# Patient Record
Sex: FEMALE | Race: WHITE | NOT HISPANIC OR LATINO | Employment: OTHER | URBAN - METROPOLITAN AREA
[De-identification: names, ages, dates, MRNs, and addresses within clinical notes are randomized per-mention and may not be internally consistent; named-entity substitution may affect disease eponyms.]

---

## 2017-08-31 ENCOUNTER — HISTORICAL (OUTPATIENT)
Dept: ADMINISTRATIVE | Facility: HOSPITAL | Age: 72
End: 2017-08-31

## 2017-09-03 LAB — FINAL CULTURE: NORMAL

## 2018-03-22 ENCOUNTER — HISTORICAL (OUTPATIENT)
Dept: RADIOLOGY | Facility: HOSPITAL | Age: 73
End: 2018-03-22

## 2018-03-22 LAB
ALBUMIN SERPL-MCNC: 3.3 GM/DL (ref 3.4–5)
ALBUMIN/GLOB SERPL: 0.9 RATIO (ref 1.1–2)
ALP SERPL-CCNC: 90 UNIT/L (ref 38–126)
ALT SERPL-CCNC: 22 UNIT/L (ref 12–78)
APPEARANCE, UA: CLEAR
AST SERPL-CCNC: 19 UNIT/L (ref 15–37)
BACTERIA #/AREA URNS AUTO: ABNORMAL /HPF
BILIRUB SERPL-MCNC: 0.3 MG/DL (ref 0.2–1)
BILIRUB UR QL STRIP: NEGATIVE
BILIRUBIN DIRECT+TOT PNL SERPL-MCNC: 0.1 MG/DL (ref 0–0.5)
BILIRUBIN DIRECT+TOT PNL SERPL-MCNC: 0.2 MG/DL (ref 0–0.8)
BUN SERPL-MCNC: 53 MG/DL (ref 7–18)
CALCIUM SERPL-MCNC: 9.1 MG/DL (ref 8.5–10.1)
CHLORIDE SERPL-SCNC: 105 MMOL/L (ref 98–107)
CO2 SERPL-SCNC: 28 MMOL/L (ref 21–32)
COLOR UR: YELLOW
CREAT SERPL-MCNC: 1.64 MG/DL (ref 0.55–1.02)
ERYTHROCYTE [DISTWIDTH] IN BLOOD BY AUTOMATED COUNT: 13.5 % (ref 11.5–17)
FT4I SERPL CALC-MCNC: 3.44 (ref 2.6–3.6)
GLOBULIN SER-MCNC: 3.7 GM/DL (ref 2.4–3.5)
GLUCOSE (UA): NEGATIVE
GLUCOSE SERPL-MCNC: 106 MG/DL (ref 74–106)
HCT VFR BLD AUTO: 39.6 % (ref 37–47)
HGB BLD-MCNC: 12.7 GM/DL (ref 12–16)
HGB UR QL STRIP: ABNORMAL
KETONES UR QL STRIP: NEGATIVE
LEUKOCYTE ESTERASE UR QL STRIP: NEGATIVE
MCH RBC QN AUTO: 30.2 PG (ref 27–31)
MCHC RBC AUTO-ENTMCNC: 32.1 GM/DL (ref 33–36)
MCV RBC AUTO: 94.3 FL (ref 80–94)
NITRITE UR QL STRIP.AUTO: NEGATIVE
PH UR STRIP: 5.5 [PH] (ref 5–9)
PLATELET # BLD AUTO: 273 X10(3)/MCL (ref 130–400)
PMV BLD AUTO: 9.4 FL (ref 9.4–12.4)
POTASSIUM SERPL-SCNC: 5 MMOL/L (ref 3.5–5.1)
PROT SERPL-MCNC: 7 GM/DL (ref 6.4–8.2)
PROT UR QL STRIP: ABNORMAL
PTH-INTACT SERPL-MCNC: 96.2 PG/ML (ref 18.4–80.1)
RBC # BLD AUTO: 4.2 X10(6)/MCL (ref 4.2–5.4)
RBC #/AREA URNS HPF: ABNORMAL /[HPF]
SODIUM SERPL-SCNC: 138 MMOL/L (ref 136–145)
SP GR UR STRIP: 1.01 (ref 1–1.03)
SQUAMOUS EPITHELIAL, UA: ABNORMAL
T3RU NFR SERPL: 37 % (ref 31–39)
T4 SERPL-MCNC: 9.3 MCG/DL (ref 4.7–13.3)
TSH SERPL-ACNC: 2.77 MIU/ML (ref 0.36–3.74)
UROBILINOGEN UR STRIP-ACNC: 0.2
WBC # SPEC AUTO: 9.4 X10(3)/MCL (ref 4.5–11.5)
WBC #/AREA URNS AUTO: ABNORMAL /[HPF]

## 2018-04-12 ENCOUNTER — HISTORICAL (OUTPATIENT)
Dept: RADIOLOGY | Facility: HOSPITAL | Age: 73
End: 2018-04-12

## 2018-08-21 ENCOUNTER — HISTORICAL (OUTPATIENT)
Dept: ADMINISTRATIVE | Facility: HOSPITAL | Age: 73
End: 2018-08-21

## 2018-08-23 LAB — FINAL CULTURE: NORMAL

## 2020-01-30 ENCOUNTER — HISTORICAL (OUTPATIENT)
Dept: SURGERY | Facility: HOSPITAL | Age: 75
End: 2020-01-30

## 2020-01-30 LAB
HCT VFR BLD AUTO: 43.1 % (ref 37–47)
HGB BLD-MCNC: 13.5 GM/DL (ref 12–16)
INR PPP: 1 (ref 0–1.3)
PLATELET # BLD AUTO: 298 X10(3)/MCL (ref 130–400)
PROTHROMBIN TIME: 13.4 SECOND(S) (ref 12–14)

## 2020-02-13 ENCOUNTER — HISTORICAL (OUTPATIENT)
Dept: SURGERY | Facility: HOSPITAL | Age: 75
End: 2020-02-13

## 2020-05-07 LAB
ABS NEUT (OLG): 7.33 X10(3)/MCL (ref 2.1–9.2)
BASOPHILS # BLD AUTO: 0.1 X10(3)/MCL (ref 0–0.2)
BASOPHILS NFR BLD AUTO: 1 %
BUN SERPL-MCNC: 51 MG/DL (ref 9.8–20.1)
CALCIUM SERPL-MCNC: 8.7 MG/DL (ref 8.4–10.2)
CHLORIDE SERPL-SCNC: 111 MMOL/L (ref 98–107)
CO2 SERPL-SCNC: 21 MMOL/L (ref 23–31)
CREAT SERPL-MCNC: 1.74 MG/DL (ref 0.55–1.02)
CREAT/UREA NIT SERPL: 29
EOSINOPHIL # BLD AUTO: 0.3 X10(3)/MCL (ref 0–0.9)
EOSINOPHIL NFR BLD AUTO: 2 %
ERYTHROCYTE [DISTWIDTH] IN BLOOD BY AUTOMATED COUNT: 15.3 % (ref 11.5–17)
GLUCOSE SERPL-MCNC: 89 MG/DL (ref 82–115)
HCT VFR BLD AUTO: 42.3 % (ref 37–47)
HGB BLD-MCNC: 13.2 GM/DL (ref 12–16)
INR PPP: 1 (ref 0–1.3)
LYMPHOCYTES # BLD AUTO: 2.8 X10(3)/MCL (ref 0.6–4.6)
LYMPHOCYTES NFR BLD AUTO: 25 %
MCH RBC QN AUTO: 30.6 PG (ref 27–31)
MCHC RBC AUTO-ENTMCNC: 31.2 GM/DL (ref 33–36)
MCV RBC AUTO: 98.1 FL (ref 80–94)
MONOCYTES # BLD AUTO: 0.7 X10(3)/MCL (ref 0.1–1.3)
MONOCYTES NFR BLD AUTO: 6 %
NEUTROPHILS # BLD AUTO: 7.33 X10(3)/MCL (ref 2.1–9.2)
NEUTROPHILS NFR BLD AUTO: 65 %
PLATELET # BLD AUTO: 314 X10(3)/MCL (ref 130–400)
PMV BLD AUTO: 10 FL (ref 9.4–12.4)
POTASSIUM SERPL-SCNC: 5.6 MMOL/L (ref 3.5–5.1)
PROTHROMBIN TIME: 12.3 SECOND(S) (ref 11.1–13.7)
RBC # BLD AUTO: 4.31 X10(6)/MCL (ref 4.2–5.4)
SODIUM SERPL-SCNC: 142 MMOL/L (ref 136–145)
WBC # SPEC AUTO: 11.3 X10(3)/MCL (ref 4.5–11.5)

## 2020-05-13 ENCOUNTER — HISTORICAL (OUTPATIENT)
Dept: SURGERY | Facility: HOSPITAL | Age: 75
End: 2020-05-13

## 2020-05-22 ENCOUNTER — HISTORICAL (OUTPATIENT)
Dept: PREADMISSION TESTING | Facility: HOSPITAL | Age: 75
End: 2020-05-22

## 2021-11-17 ENCOUNTER — HISTORICAL (OUTPATIENT)
Dept: ADMINISTRATIVE | Facility: HOSPITAL | Age: 76
End: 2021-11-17

## 2021-11-17 LAB
BUN SERPL-MCNC: 40.5 MG/DL (ref 9.8–20.1)
CALCIUM SERPL-MCNC: 9 MG/DL (ref 8.7–10.5)
CHLORIDE SERPL-SCNC: 106 MMOL/L (ref 98–107)
CO2 SERPL-SCNC: 26 MMOL/L (ref 23–31)
CREAT SERPL-MCNC: 3.33 MG/DL (ref 0.55–1.02)
CREAT/UREA NIT SERPL: 12
GLUCOSE SERPL-MCNC: 91 MG/DL (ref 82–115)
POTASSIUM SERPL-SCNC: 3.8 MMOL/L (ref 3.5–5.1)
SODIUM SERPL-SCNC: 142 MMOL/L (ref 136–145)

## 2022-03-02 ENCOUNTER — HISTORICAL (OUTPATIENT)
Dept: PREADMISSION TESTING | Facility: HOSPITAL | Age: 77
End: 2022-03-02

## 2022-03-17 ENCOUNTER — HISTORICAL (OUTPATIENT)
Dept: ADMINISTRATIVE | Facility: HOSPITAL | Age: 77
End: 2022-03-17

## 2022-03-17 LAB
ALBUMIN SERPL-MCNC: 3.3 G/DL (ref 3.4–4.8)
ALBUMIN/GLOB SERPL: 0.8 {RATIO} (ref 1.1–2)
ALP SERPL-CCNC: 94 U/L (ref 40–150)
ALT SERPL-CCNC: 15 U/L (ref 0–55)
APTT PPP: 32.9 S (ref 23.2–33.7)
AST SERPL-CCNC: 19 U/L (ref 5–34)
BILIRUB SERPL-MCNC: 0.4 MG/DL
BILIRUBIN DIRECT+TOT PNL SERPL-MCNC: 0.2 (ref 0–0.5)
BILIRUBIN DIRECT+TOT PNL SERPL-MCNC: 0.2 (ref 0–0.8)
BUN SERPL-MCNC: 61.2 MG/DL (ref 9.8–20.1)
CALCIUM SERPL-MCNC: 9.9 MG/DL (ref 8.7–10.5)
CHLORIDE SERPL-SCNC: 105 MMOL/L (ref 98–107)
CO2 SERPL-SCNC: 23 MMOL/L (ref 23–31)
CREAT SERPL-MCNC: 4.84 MG/DL (ref 0.55–1.02)
ERYTHROCYTE [DISTWIDTH] IN BLOOD BY AUTOMATED COUNT: 15 % (ref 11.5–17)
GLOBULIN SER-MCNC: 4.1 G/DL (ref 2.4–3.5)
GLUCOSE SERPL-MCNC: 91 MG/DL (ref 82–115)
HCT VFR BLD AUTO: 37.6 % (ref 37–47)
HEMOLYSIS INTERF INDEX SERPL-ACNC: 5
HGB BLD-MCNC: 11.9 G/DL (ref 12–16)
ICTERIC INTERF INDEX SERPL-ACNC: 0
INR PPP: 1 (ref 0–1.3)
LIPEMIC INTERF INDEX SERPL-ACNC: 6
MCH RBC QN AUTO: 32.8 PG (ref 27–31)
MCHC RBC AUTO-ENTMCNC: 31.6 G/DL (ref 33–36)
MCV RBC AUTO: 103.6 FL (ref 80–94)
PLATELET # BLD AUTO: 295 10*3/UL (ref 130–400)
PMV BLD AUTO: 10 FL (ref 9.4–12.4)
POTASSIUM SERPL-SCNC: 6 MMOL/L (ref 3.5–5.1)
PROT SERPL-MCNC: 7.4 G/DL (ref 5.8–7.6)
PROTHROMBIN TIME: 13.3 S (ref 12.5–14.5)
RBC # BLD AUTO: 3.63 10*6/UL (ref 4.2–5.4)
SODIUM SERPL-SCNC: 140 MMOL/L (ref 136–145)
WBC # SPEC AUTO: 10.4 10*3/UL (ref 4.5–11.5)

## 2022-03-23 ENCOUNTER — HISTORICAL (OUTPATIENT)
Dept: RADIOLOGY | Facility: HOSPITAL | Age: 77
End: 2022-03-23

## 2022-03-23 ENCOUNTER — HISTORICAL (OUTPATIENT)
Dept: ADMINISTRATIVE | Facility: HOSPITAL | Age: 77
End: 2022-03-23

## 2022-04-10 ENCOUNTER — HISTORICAL (OUTPATIENT)
Dept: ADMINISTRATIVE | Facility: HOSPITAL | Age: 77
End: 2022-04-10
Payer: MEDICARE

## 2022-04-25 VITALS
DIASTOLIC BLOOD PRESSURE: 75 MMHG | OXYGEN SATURATION: 98 % | SYSTOLIC BLOOD PRESSURE: 142 MMHG | WEIGHT: 132.94 LBS | BODY MASS INDEX: 25.1 KG/M2 | HEIGHT: 61 IN

## 2022-04-29 NOTE — OP NOTE
Patient:   Deepti Zambrano            MRN: [id number]            FIN: [id number]               Age:   74 years     Sex:  Female     :  1945   Associated Diagnoses:   None   Author:   Magnus Bocanegra MD      DATE OF SURGERY:    2020    SURGEON:  Magnus Bocanegra MD    PREOPERATIVE DIAGNOSIS:  Lumbar spondylosis.    POSTOPERATIVE DIAGNOSIS:  Lumbar spondylosis.    PROCEDURE PERFORMED:  Fluoroscopically-guided medial branch blocks at bilateral L3, L4, L5, S1.    EQUIPMENT USED:  Epidural tray.    DESCRIPTION OF PROCEDURE:  Following informed consent, the patient was prepped and draped in the usual sterile fashion.  I infiltrated the tissue overlying L3, L4, L5, S1 with local anesthetic.  Under fluoroscopic guidance, I advanced eight 22-gauge 3.5 inch BD spinal needles, performing medial branch blocks [id number] 2% lidocaine.  I removed the needles and applied sterile dressings.  The patient tolerated the procedure well without apparent complication.    IMPRESSION:  Successful fluoroscopically-guided medial branch blocks at bilateral L3, L4, L5, S1.

## 2022-04-29 NOTE — OP NOTE
Patient:   Deepti Zambrano            MRN: [id number]            FIN: [id number]               Age:   75 years     Sex:  Female     :  1945   Associated Diagnoses:   None   Author:   Magnus Bocanegra MD      DATE OF SURGERY:    2020    SURGEON:  Magnus Bocanegra MD    PREOPERATIVE DIAGNOSIS:  Lumbar spondylosis.    POSTOPERATIVE DIAGNOSIS:  Lumbar spondylosis.    PROCEDURE:  Fluoroscopically guided radiofrequency ablation at right L3, L4, L5, S1    PROCEDURE IN DETAIL:  Following informed consent, and with the patient under general anesthesia, she was prepped and draped in usual sterile fashion.  The skin and subcutaneous tissue were anesthetized.  Following this I used an 18 gauge angulated 100 mm exposed tip needle at each level, correctly placing them under fluoroscopic guidance at the junction of the SAP and transverse process.  After stimulating the medial branch nerve at each level without evidence of motor signs, I performed radiofrequency ablation of each level, heating the affected nerves to 80 degrees centigrade for 90 seconds.  A total of four sites were heated and treated.  I removed the needles and applied sterile dressings.  The patient tolerated the procedure well without apparent complications.    IMPRESSION:  Successful fluoroscopically guided radiofrequency ablation at right L3, L4, L5, S1        ______________________________  Magnus Bocanegra MD

## 2022-04-29 NOTE — OP NOTE
Patient:   Deepti Zambrano             MRN: [id number]            FIN: [id number]               Age:   76 years     Sex:  Female     :  1945   Associated Diagnoses:   ESRD on dialysis   Author:   Rosita Gerber MD      Brief Operative Note   Operative Information   Date/ Time:  2021 12:28:00.     Preoperative Diagnosis: ESRD on dialysis (BNU84-BJ N18.6).     Postoperative Diagnosis: ESRD on dialysis (OXP53-NN N18.6).     Procedures Performed: Left upper extremity dialysis graft placement.     Indications: Mr. Zambrano is a 77 y/o woman with ESRD who needs long term dialysis access.  She is currently using a right chest wall tunneled catheter.  She presents for left arm access creation. .     Surgeon: Rosita Gerber MD.     Esimated blood loss: loss less than  100  cc.     Description of Procedure/Findings/    Complications: The patient was taken to the OR and placed in a supine position on the operative table.  The left upper extremity was prepped and draped in the usual sterile fashion.   2 grams ancef infusion started prior to the incision.  Appropriate timeout was performed. B mode u/s was utilized to identify the brachial artery and axillary vein in the proximal upper arm.  Incision was made and dissection was performed through the subcutaneous tissues and facia to expose the brachial artery and axillary vein.  Each were controlled with vessel loops.  A 4 to 7 mm goretex graft was placed via subcutaneous tunnelling in loop configuration with assistance of a counterincision in the distal upper arm.  5000 units of heparin was administered and a longitudinal incision was made on the brachial artery.   Anastomosis was performed with a Castlewood-Marcell CV6 suture.  Flow was restored through the brachial artery.  The distal end of the graft was spatulated and venotomy was made.   Another anastamosis with [place] CV-6  suture was performed.  Flow was restored to the system.  There was thrill to  the graft and a palpable radial pulse.    Protamine was administered and hemostasis was obtained.  Wound was irrigated with antibiotic solution.   Both wounds were closed with 3-0 vicryl suture and 4-0 monocryl suture.   Dermabond dressing was placed.      .

## 2022-04-29 NOTE — OP NOTE
Patient:   Deepti Zambrano            MRN: [id number]            FIN: [id number]               Age:   74 years     Sex:  Female     :  1945   Associated Diagnoses:   None   Author:   Magnus Bocanegra MD      DATE OF SURGERY:    2020    SURGEON:  Magnus Bocanegra MD    PREOPERATIVE DIAGNOSIS:  Lumbar spondylosis.    POSTOPERATIVE DIAGNOSIS:  Lumbar spondylosis.    PROCEDURE PERFORMED:  Fluoroscopically-guided medial branch blocks at bilateral L3, L4, L5, S1.    EQUIPMENT USED:  Epidural tray.    DESCRIPTION OF PROCEDURE:  Following informed consent, the patient was prepped and draped in the usual sterile fashion.  I infiltrated the tissue overlying L3, L4, L5, S1 with local anesthetic.  Under fluoroscopic guidance, I advanced eight 22-gauge 3.5 inch BD spinal needles, performing medial branch blocks [id number] 2% lidocaine.  I removed the needles and applied sterile dressings.  The patient tolerated the procedure well without apparent complication.    IMPRESSION:  Successful fluoroscopically-guided medial branch blocks at bilateral L3, L4, L5, S1.

## 2022-05-18 DIAGNOSIS — R91.8 LUNG MASS: Primary | ICD-10-CM

## 2022-06-15 DIAGNOSIS — Z01.818 OTHER SPECIFIED PRE-OPERATIVE EXAMINATION: Primary | ICD-10-CM

## 2022-06-17 ENCOUNTER — HOSPITAL ENCOUNTER (OUTPATIENT)
Dept: RADIOLOGY | Facility: HOSPITAL | Age: 77
Discharge: HOME | End: 2022-06-17
Attending: SURGERY
Payer: MEDICARE

## 2022-06-17 DIAGNOSIS — Z01.818 OTHER SPECIFIED PRE-OPERATIVE EXAMINATION: ICD-10-CM

## 2022-06-17 PROCEDURE — 71046 X-RAY EXAM CHEST 2 VIEWS: CPT | Mod: TC

## 2022-06-18 RX ORDER — METOPROLOL SUCCINATE 50 MG/1
50 TABLET, EXTENDED RELEASE ORAL DAILY
COMMUNITY
Start: 2022-05-07

## 2022-06-18 RX ORDER — HYDRALAZINE HYDROCHLORIDE 50 MG/1
50 TABLET, FILM COATED ORAL EVERY 8 HOURS
COMMUNITY

## 2022-06-18 RX ORDER — AMLODIPINE BESYLATE 10 MG/1
10 TABLET ORAL NIGHTLY
COMMUNITY
Start: 2022-05-25 | End: 2024-01-01 | Stop reason: CLARIF

## 2022-06-18 RX ORDER — CLOPIDOGREL BISULFATE 75 MG/1
75 TABLET ORAL DAILY
Status: ON HOLD | COMMUNITY
Start: 2022-05-25 | End: 2023-01-01 | Stop reason: HOSPADM

## 2022-06-18 RX ORDER — SEVELAMER CARBONATE 800 MG/1
800 TABLET, FILM COATED ORAL
Status: ON HOLD | COMMUNITY
Start: 2022-05-09 | End: 2023-01-01 | Stop reason: HOSPADM

## 2022-06-18 RX ORDER — PANTOPRAZOLE SODIUM 40 MG/1
40 TABLET, DELAYED RELEASE ORAL NIGHTLY
COMMUNITY
Start: 2022-05-25

## 2022-06-18 RX ORDER — AMITRIPTYLINE HYDROCHLORIDE 25 MG/1
25 TABLET, FILM COATED ORAL NIGHTLY
COMMUNITY
Start: 2022-05-09

## 2022-06-18 RX ORDER — FUROSEMIDE 80 MG/1
40 TABLET ORAL DAILY
COMMUNITY
Start: 2022-05-09

## 2022-06-18 RX ORDER — ATORVASTATIN CALCIUM 40 MG/1
40 TABLET, FILM COATED ORAL NIGHTLY
COMMUNITY
Start: 2022-05-25 | End: 2024-01-01 | Stop reason: CLARIF

## 2022-06-18 RX ORDER — ALLOPURINOL 100 MG/1
100 TABLET ORAL NIGHTLY
COMMUNITY
Start: 2022-05-25

## 2022-06-22 ENCOUNTER — HOSPITAL ENCOUNTER (OUTPATIENT)
Facility: HOSPITAL | Age: 77
Discharge: HOME | End: 2022-06-22
Attending: SURGERY | Admitting: SURGERY
Payer: MEDICARE

## 2022-06-22 ENCOUNTER — ANESTHESIA (OUTPATIENT)
Dept: SURGERY | Facility: HOSPITAL | Age: 77
End: 2022-06-22
Payer: MEDICARE

## 2022-06-22 ENCOUNTER — ANESTHESIA EVENT (OUTPATIENT)
Dept: SURGERY | Facility: HOSPITAL | Age: 77
End: 2022-06-22
Payer: MEDICARE

## 2022-06-22 ENCOUNTER — ANESTHESIA EVENT (OUTPATIENT)
Dept: SURGERY | Facility: HOSPITAL | Age: 77
End: 2022-06-22

## 2022-06-22 ENCOUNTER — ANESTHESIA (OUTPATIENT)
Dept: SURGERY | Facility: HOSPITAL | Age: 77
End: 2022-06-22

## 2022-06-22 LAB
ANION GAP SERPL CALC-SCNC: 15 MMOL/L (ref 8–16)
BUN SERPL-MCNC: 65 MG/DL (ref 6–30)
CHLORIDE SERPL-SCNC: 104 MMOL/L (ref 95–110)
CREAT SERPL-MCNC: 4.7 MG/DL (ref 0.5–1.4)
GLUCOSE SERPL-MCNC: 97 MG/DL (ref 70–110)
HCT VFR BLD CALC: 38 %PCV (ref 36–54)
HGB BLD-MCNC: 13 G/DL
POC IONIZED CALCIUM: 1.19 MMOL/L (ref 1.06–1.42)
POC TCO2 (MEASURED): 27 MMOL/L (ref 23–29)
POTASSIUM BLD-SCNC: 4.9 MMOL/L (ref 3.5–5.1)
SAMPLE: ABNORMAL
SODIUM BLD-SCNC: 140 MMOL/L (ref 136–145)

## 2022-06-22 PROCEDURE — 71000015 HC POSTOP RECOV 1ST HR: Performed by: SURGERY

## 2022-06-22 PROCEDURE — 63600175 PHARM REV CODE 636 W HCPCS

## 2022-06-22 PROCEDURE — 25000003 PHARM REV CODE 250

## 2022-06-22 PROCEDURE — 36000708 HC OR TIME LEV III 1ST 15 MIN: Performed by: SURGERY

## 2022-06-22 PROCEDURE — 71000033 HC RECOVERY, INTIAL HOUR: Performed by: SURGERY

## 2022-06-22 PROCEDURE — 25000003 PHARM REV CODE 250: Performed by: SURGERY

## 2022-06-22 PROCEDURE — 63600175 PHARM REV CODE 636 W HCPCS: Performed by: SURGERY

## 2022-06-22 PROCEDURE — 36000709 HC OR TIME LEV III EA ADD 15 MIN: Performed by: SURGERY

## 2022-06-22 PROCEDURE — 37000008 HC ANESTHESIA 1ST 15 MINUTES: Performed by: SURGERY

## 2022-06-22 PROCEDURE — 63600175 PHARM REV CODE 636 W HCPCS: Performed by: ANESTHESIOLOGY

## 2022-06-22 PROCEDURE — 71000039 HC RECOVERY, EACH ADD'L HOUR: Performed by: SURGERY

## 2022-06-22 PROCEDURE — 71000016 HC POSTOP RECOV ADDL HR: Performed by: SURGERY

## 2022-06-22 PROCEDURE — 37000009 HC ANESTHESIA EA ADD 15 MINS: Performed by: SURGERY

## 2022-06-22 PROCEDURE — C1750 CATH, HEMODIALYSIS,LONG-TERM: HCPCS | Performed by: SURGERY

## 2022-06-22 RX ORDER — HEPARIN SODIUM 5000 [USP'U]/ML
INJECTION, SOLUTION INTRAVENOUS; SUBCUTANEOUS
Status: DISCONTINUED | OUTPATIENT
Start: 2022-06-22 | End: 2022-06-22 | Stop reason: HOSPADM

## 2022-06-22 RX ORDER — HYDROMORPHONE HYDROCHLORIDE 2 MG/ML
INJECTION, SOLUTION INTRAMUSCULAR; INTRAVENOUS; SUBCUTANEOUS
Status: DISCONTINUED
Start: 2022-06-22 | End: 2022-06-22 | Stop reason: HOSPADM

## 2022-06-22 RX ORDER — MIDAZOLAM HYDROCHLORIDE 1 MG/ML
INJECTION INTRAMUSCULAR; INTRAVENOUS
Status: COMPLETED
Start: 2022-06-22 | End: 2022-06-22

## 2022-06-22 RX ORDER — IPRATROPIUM BROMIDE AND ALBUTEROL SULFATE 2.5; .5 MG/3ML; MG/3ML
3 SOLUTION RESPIRATORY (INHALATION) ONCE AS NEEDED
Status: DISCONTINUED | OUTPATIENT
Start: 2022-06-22 | End: 2022-06-22 | Stop reason: HOSPADM

## 2022-06-22 RX ORDER — FENTANYL CITRATE 50 UG/ML
INJECTION, SOLUTION INTRAMUSCULAR; INTRAVENOUS
Status: DISCONTINUED | OUTPATIENT
Start: 2022-06-22 | End: 2022-06-22

## 2022-06-22 RX ORDER — ONDANSETRON 4 MG/1
8 TABLET, ORALLY DISINTEGRATING ORAL EVERY 6 HOURS PRN
Status: CANCELLED | OUTPATIENT
Start: 2022-06-22

## 2022-06-22 RX ORDER — HYDROMORPHONE HYDROCHLORIDE 2 MG/ML
0.4 INJECTION, SOLUTION INTRAMUSCULAR; INTRAVENOUS; SUBCUTANEOUS EVERY 5 MIN PRN
Status: DISCONTINUED | OUTPATIENT
Start: 2022-06-22 | End: 2022-06-22 | Stop reason: HOSPADM

## 2022-06-22 RX ORDER — SODIUM CHLORIDE, SODIUM LACTATE, POTASSIUM CHLORIDE, CALCIUM CHLORIDE 600; 310; 30; 20 MG/100ML; MG/100ML; MG/100ML; MG/100ML
INJECTION, SOLUTION INTRAVENOUS CONTINUOUS
Status: DISCONTINUED | OUTPATIENT
Start: 2022-06-22 | End: 2022-06-22 | Stop reason: HOSPADM

## 2022-06-22 RX ORDER — HYDROCODONE BITARTRATE AND ACETAMINOPHEN 5; 325 MG/1; MG/1
1 TABLET ORAL EVERY 6 HOURS PRN
Qty: 20 TABLET | Refills: 0 | Status: ON HOLD | OUTPATIENT
Start: 2022-06-22 | End: 2022-07-28 | Stop reason: HOSPADM

## 2022-06-22 RX ORDER — HYDROCODONE BITARTRATE AND ACETAMINOPHEN 7.5; 325 MG/1; MG/1
1 TABLET ORAL EVERY 6 HOURS PRN
Status: DISCONTINUED | OUTPATIENT
Start: 2022-06-22 | End: 2022-06-22 | Stop reason: HOSPADM

## 2022-06-22 RX ORDER — BUPIVACAINE HYDROCHLORIDE AND EPINEPHRINE 5; 5 MG/ML; UG/ML
INJECTION, SOLUTION EPIDURAL; INTRACAUDAL; PERINEURAL
Status: DISCONTINUED | OUTPATIENT
Start: 2022-06-22 | End: 2022-06-22 | Stop reason: HOSPADM

## 2022-06-22 RX ORDER — MEPERIDINE HYDROCHLORIDE 25 MG/ML
12.5 INJECTION INTRAMUSCULAR; INTRAVENOUS; SUBCUTANEOUS ONCE AS NEEDED
Status: DISCONTINUED | OUTPATIENT
Start: 2022-06-22 | End: 2022-06-22 | Stop reason: HOSPADM

## 2022-06-22 RX ORDER — LIDOCAINE HYDROCHLORIDE 10 MG/ML
1 INJECTION, SOLUTION EPIDURAL; INFILTRATION; INTRACAUDAL; PERINEURAL ONCE
Status: DISCONTINUED | OUTPATIENT
Start: 2022-06-22 | End: 2022-06-22 | Stop reason: HOSPADM

## 2022-06-22 RX ORDER — MIDAZOLAM HYDROCHLORIDE 1 MG/ML
2 INJECTION INTRAMUSCULAR; INTRAVENOUS ONCE AS NEEDED
Status: COMPLETED | OUTPATIENT
Start: 2022-06-22 | End: 2022-06-22

## 2022-06-22 RX ORDER — LEVOFLOXACIN 5 MG/ML
500 INJECTION, SOLUTION INTRAVENOUS
Status: DISCONTINUED | OUTPATIENT
Start: 2022-06-22 | End: 2022-06-22 | Stop reason: HOSPADM

## 2022-06-22 RX ORDER — LIDOCAINE HYDROCHLORIDE 10 MG/ML
INJECTION, SOLUTION EPIDURAL; INFILTRATION; INTRACAUDAL; PERINEURAL
Status: DISCONTINUED | OUTPATIENT
Start: 2022-06-22 | End: 2022-06-22

## 2022-06-22 RX ORDER — ONDANSETRON 2 MG/ML
4 INJECTION INTRAMUSCULAR; INTRAVENOUS EVERY 4 HOURS PRN
Status: DISCONTINUED | OUTPATIENT
Start: 2022-06-22 | End: 2022-06-22 | Stop reason: HOSPADM

## 2022-06-22 RX ORDER — BUPIVACAINE HYDROCHLORIDE AND EPINEPHRINE 5; 5 MG/ML; UG/ML
INJECTION, SOLUTION EPIDURAL; INTRACAUDAL; PERINEURAL
Status: DISCONTINUED
Start: 2022-06-22 | End: 2022-06-22 | Stop reason: HOSPADM

## 2022-06-22 RX ORDER — SODIUM CHLORIDE, SODIUM GLUCONATE, SODIUM ACETATE, POTASSIUM CHLORIDE AND MAGNESIUM CHLORIDE 30; 37; 368; 526; 502 MG/100ML; MG/100ML; MG/100ML; MG/100ML; MG/100ML
1000 INJECTION, SOLUTION INTRAVENOUS CONTINUOUS
Status: DISCONTINUED | OUTPATIENT
Start: 2022-06-22 | End: 2022-06-22 | Stop reason: HOSPADM

## 2022-06-22 RX ORDER — NEOSTIGMINE METHYLSULFATE 0.5 MG/ML
INJECTION, SOLUTION INTRAVENOUS
Status: DISCONTINUED | OUTPATIENT
Start: 2022-06-22 | End: 2022-06-22

## 2022-06-22 RX ORDER — TRAMADOL HYDROCHLORIDE 50 MG/1
50 TABLET ORAL EVERY 4 HOURS PRN
Status: DISCONTINUED | OUTPATIENT
Start: 2022-06-22 | End: 2022-06-22 | Stop reason: HOSPADM

## 2022-06-22 RX ORDER — MEPERIDINE HYDROCHLORIDE 25 MG/ML
50 INJECTION INTRAMUSCULAR; INTRAVENOUS; SUBCUTANEOUS
Status: DISCONTINUED | OUTPATIENT
Start: 2022-06-22 | End: 2022-06-22 | Stop reason: HOSPADM

## 2022-06-22 RX ORDER — GLYCOPYRROLATE 0.2 MG/ML
INJECTION INTRAMUSCULAR; INTRAVENOUS
Status: DISCONTINUED | OUTPATIENT
Start: 2022-06-22 | End: 2022-06-22

## 2022-06-22 RX ORDER — PROPOFOL 10 MG/ML
VIAL (ML) INTRAVENOUS
Status: DISCONTINUED | OUTPATIENT
Start: 2022-06-22 | End: 2022-06-22

## 2022-06-22 RX ORDER — VANCOMYCIN HYDROCHLORIDE 500 MG/10ML
INJECTION, POWDER, LYOPHILIZED, FOR SOLUTION INTRAVENOUS
Status: DISCONTINUED
Start: 2022-06-22 | End: 2022-06-22 | Stop reason: HOSPADM

## 2022-06-22 RX ORDER — HEPARIN SODIUM 5000 [USP'U]/ML
INJECTION, SOLUTION INTRAVENOUS; SUBCUTANEOUS
Status: DISCONTINUED
Start: 2022-06-22 | End: 2022-06-22 | Stop reason: HOSPADM

## 2022-06-22 RX ORDER — SODIUM CITRATE AND CITRIC ACID MONOHYDRATE 334; 500 MG/5ML; MG/5ML
30 SOLUTION ORAL ONCE
Status: CANCELLED | OUTPATIENT
Start: 2022-06-22 | End: 2022-06-22

## 2022-06-22 RX ORDER — ROCURONIUM BROMIDE 10 MG/ML
INJECTION, SOLUTION INTRAVENOUS
Status: DISCONTINUED | OUTPATIENT
Start: 2022-06-22 | End: 2022-06-22

## 2022-06-22 RX ORDER — ACETAMINOPHEN 10 MG/ML
1000 INJECTION, SOLUTION INTRAVENOUS ONCE
Status: DISCONTINUED | OUTPATIENT
Start: 2022-06-22 | End: 2022-06-22 | Stop reason: HOSPADM

## 2022-06-22 RX ORDER — HYDROMORPHONE HYDROCHLORIDE 2 MG/ML
0.2 INJECTION, SOLUTION INTRAMUSCULAR; INTRAVENOUS; SUBCUTANEOUS EVERY 5 MIN PRN
Status: DISCONTINUED | OUTPATIENT
Start: 2022-06-22 | End: 2022-06-22 | Stop reason: HOSPADM

## 2022-06-22 RX ADMIN — HYDROMORPHONE HYDROCHLORIDE 0.4 MG: 2 INJECTION, SOLUTION INTRAMUSCULAR; INTRAVENOUS; SUBCUTANEOUS at 02:06

## 2022-06-22 RX ADMIN — NEOSTIGMINE METHYLSULFATE 4 MG: 0.5 INJECTION INTRAVENOUS at 02:06

## 2022-06-22 RX ADMIN — ROCURONIUM BROMIDE 40 MG: 10 SOLUTION INTRAVENOUS at 01:06

## 2022-06-22 RX ADMIN — FENTANYL CITRATE 50 MCG: 50 INJECTION, SOLUTION INTRAMUSCULAR; INTRAVENOUS at 02:06

## 2022-06-22 RX ADMIN — GLYCOPYRROLATE 0.4 MG: 0.2 INJECTION INTRAMUSCULAR; INTRAVENOUS at 02:06

## 2022-06-22 RX ADMIN — LIDOCAINE HYDROCHLORIDE 40 MG: 10 INJECTION, SOLUTION EPIDURAL; INFILTRATION; INTRACAUDAL; PERINEURAL at 01:06

## 2022-06-22 RX ADMIN — MIDAZOLAM HYDROCHLORIDE 2 MG: 1 INJECTION, SOLUTION INTRAMUSCULAR; INTRAVENOUS at 01:06

## 2022-06-22 RX ADMIN — MIDAZOLAM HYDROCHLORIDE 2 MG: 1 INJECTION INTRAMUSCULAR; INTRAVENOUS at 01:06

## 2022-06-22 RX ADMIN — FENTANYL CITRATE 50 MCG: 50 INJECTION, SOLUTION INTRAMUSCULAR; INTRAVENOUS at 01:06

## 2022-06-22 RX ADMIN — VANCOMYCIN HYDROCHLORIDE 100 ML: 500 INJECTION, POWDER, LYOPHILIZED, FOR SOLUTION INTRAVENOUS at 01:06

## 2022-06-22 RX ADMIN — PROPOFOL 120 MG: 10 INJECTION, EMULSION INTRAVENOUS at 01:06

## 2022-06-22 RX ADMIN — VANCOMYCIN HYDROCHLORIDE: 500 INJECTION, POWDER, LYOPHILIZED, FOR SOLUTION INTRAVENOUS at 12:06

## 2022-06-22 RX ADMIN — SODIUM CHLORIDE, POTASSIUM CHLORIDE, SODIUM LACTATE AND CALCIUM CHLORIDE: 600; 310; 30; 20 INJECTION, SOLUTION INTRAVENOUS at 12:06

## 2022-06-22 NOTE — DISCHARGE INSTRUCTIONS
Activity - no heavy lifting >20lbs x 4 weeks   Wounds - may shower in 48hr. NO tub baths or soaking. After gently cleansing with soap and water, pat dressings dry. Allow steri-strips to fall off on their own. We will remove them at your post-op visit.    Diet - resume  Follow up in 2 weeks  Do NOT use PD catheter until evaluation at post-op visit

## 2022-06-22 NOTE — OP NOTE
Patient:  Deepti Zambrano        :  1945         Date of Surgery:  2022          SURGEON:  Edmund Echols MD           ASSISTANT:  Maria Isabel Price NP          PREOPERATIVE DIAGNOSIS:  Chronic Renal Failure           POSTOPERATIVE DIAGNOSIS:  Same.           OPERATIONS:  Laparoscopic Peritoneal Dialysis Catheter placement           Anesthesia:  General endotracheal anesthesia      Estimated blood loss:  minimal        Blood administered:  None      Lap and instrument counts correct x 2 at the end of the case.             INDICATIONS/SIGNIFICANT HISTORY:  The patient is a 77 y.o.  year old female who was referred for evaluation of peritoneal dialysis catheter for chronic renal failure. Risks and Benefits of surgery was discussed with the patient, who voiced understanding of risks and benefits and elected to proceed with surgery.           PROCEDURE IN DETAIL:  Once informed consents were obtained, the patient was taken to the operating room and placed supine on the operating table.  After general endotracheal anesthesia was induced, the abdomen was prepped and draped in a standard surgical fashion.  A 5mm incision was made in the Left upper and a Visiport trocar was used to enter the abdominal cavity.  Pneumoperitoneum was created with insufflation.  After adequate insufflation, the 5mm scope was used to visualized the intra-abdominal compartment.  The catheter was tunneled subcutaneous in the standard fashion and tunneled into the abdominal cavity under direct visualization.  There was no scar tissue intraabdominally.  No bleeding was appreciated.  The scope and port was then removed along with insufflation.  The skin was closed with a 4-0 Vicryl suture in an interrupted fashion.  The patient tolerated the procedure well and was transported to recovery room in good condition.

## 2022-06-22 NOTE — ANESTHESIA PROCEDURE NOTES
Intubation    Date/Time: 6/22/2022 1:45 PM  Performed by: Nancy Leyva CRNA  Authorized by: Victor Hugo Hsieh MD     Intubation:     Induction:  Inhalational - mask    Intubated:  Postinduction    Mask Ventilation:  Easy with oral airway    Attempts:  1    Attempted By:  CRNA    Method of Intubation:  Direct    Blade:  Coty 3    Laryngeal View Grade: Grade I - full view of cords      Difficult Airway Encountered?: No      Complications:  None    Airway Device:  Oral endotracheal tube    Airway Device Size:  7.0    Style/Cuff Inflation:  Cuffed (inflated to minimal occlusive pressure)    Tube secured:  20    Secured at:  The lips    Placement Verified By:  Capnometry    Complicating Factors:  None    Findings Post-Intubation:  BS equal bilateral

## 2022-06-22 NOTE — ANESTHESIA POSTPROCEDURE EVALUATION
Anesthesia Post Evaluation    Patient: Deepti Zambrano    Procedure(s) Performed: Procedure(s) (LRB):  INSERTION, CATHETER, DIALYSIS, PERITONEAL, LAPAROSCOPIC (N/A)          Patient location during evaluation: PACU  Post-procedure mental status: @ basline.  Post-procedure vital signs: reviewed and stable  Pain management: adequate      Anesthetic complications: no      Cardiovascular status: blood pressure returned to baseline  Respiratory status: @ baseline.  Hydration status: euvolemic            Vitals Value Taken Time   /85 06/22/22 1451   Temp 36.4 °C (97.5 °F) 06/22/22 1427   Pulse 71 06/22/22 1453   Resp 14 06/22/22 1453   SpO2 100 % 06/22/22 1453   Vitals shown include unvalidated device data.      No case tracking events are documented in the log.      Pain/Caroline Score: Pain Rating Prior to Med Admin: 10 (6/22/2022  2:44 PM)  Caroline Score: 7 (6/22/2022  2:27 PM)

## 2022-06-22 NOTE — ANESTHESIA PREPROCEDURE EVALUATION
06/22/2022  Deepti Zambrano is a 77 y.o., female.      Pre-op Assessment    I have reviewed the Patient Summary Reports.     I have reviewed the Nursing Notes. I have reviewed the NPO Status.   I have reviewed the Medications.     Review of Systems      Physical Exam  General: Well nourished and Cooperative    Airway:  Mallampati: II   Mouth Opening: Normal  TM Distance: Normal  Tongue: Normal  Neck ROM: Normal ROM    Dental:  Dentures    Chest/Lungs:  Clear to auscultation    Heart:  Rate: Normal        Anesthesia Plan  Type of Anesthesia, risks & benefits discussed:    Anesthesia Type: Gen ETT  Intra-op Monitoring Plan: Standard ASA Monitors  Post Op Pain Control Plan: multimodal analgesia  Induction:  IV  Informed Consent: Informed consent signed with the Patient and all parties understand the risks and agree with anesthesia plan.  All questions answered.   ASA Score: 4  Day of Surgery Review of History & Physical: H&P Update referred to the surgeon/provider.  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 Gordon    Hct 33, will check K with istat this am, istat K 4.9    I explained anesthesia plan to patient/responsbile party if available.  Anesthesia consent done going over the material facts, risks, complications & alternatives, obtained which includes the possibility of altering the anesthesia plan.  I reviewed appropriate labs, any workup, Xray, EKG etc.  Patients condition is satisfactory to proceed with anesthesia plan unless otherwise noted (see anesthesia chart for details of the anesthesia plan carried out).     Ready For Surgery From Anesthesia Perspective.     .

## 2022-06-22 NOTE — TRANSFER OF CARE
"Anesthesia Transfer of Care Note    Patient: Deepti Zambrano    Procedure(s) Performed: Procedure(s) (LRB):  INSERTION, CATHETER, DIALYSIS, PERITONEAL, LAPAROSCOPIC (N/A)    Patient location: PACU    Anesthesia Type: general    Transport from OR: Transported from OR on room air with adequate spontaneous ventilation. Transported from OR on 6-10 L/min O2 by face mask with adequate spontaneous ventilation    Post pain: adequate analgesia    Post assessment: no apparent anesthetic complications and tolerated procedure well    Post vital signs: stable    Level of consciousness: responds to stimulation    Nausea/Vomiting: no nausea/vomiting    Complications: none    Transfer of care protocol was followed      Last vitals:   Visit Vitals  BP (!) 167/72 (BP Location: Right arm, Patient Position: Lying)   Pulse 88   Temp 36.4 °C (97.5 °F) (Temporal)   Resp 18   Ht 5' 1" (1.549 m)   Wt 62.5 kg (137 lb 12.6 oz)   SpO2 98%   Breastfeeding No   BMI 26.03 kg/m²     "

## 2022-06-25 NOTE — DISCHARGE SUMMARY
[facility] Surgical - Periop Services  Discharge Note  Short Stay    Procedure(s) (LRB):  INSERTION, CATHETER, DIALYSIS, PERITONEAL, LAPAROSCOPIC (N/A)    OUTCOME: Patient tolerated treatment/procedure well without complication and is now ready for discharge.    DISPOSITION: Home or Self Care    FINAL DIAGNOSIS:  <principal problem not specified>    FOLLOWUP: In clinic    DISCHARGE INSTRUCTIONS:  No discharge procedures on file.      Clinical Reference Documents Added to Patient Instructions       Document    PERITONEAL DIALYSIS CATHETER PLACEMENT DISCHARGE INSTRUCTIONS (ENGLISH)    PERITONEAL DIALYSIS DISCHARGE INSTRUCTIONS (ENGLISH)          TIME SPENT ON DISCHARGE:      minutes

## 2022-06-29 VITALS
SYSTOLIC BLOOD PRESSURE: 154 MMHG | HEART RATE: 77 BPM | OXYGEN SATURATION: 93 % | DIASTOLIC BLOOD PRESSURE: 70 MMHG | WEIGHT: 137.81 LBS | HEIGHT: 61 IN | RESPIRATION RATE: 16 BRPM | TEMPERATURE: 98 F | BODY MASS INDEX: 26.02 KG/M2

## 2022-07-13 ENCOUNTER — HOSPITAL ENCOUNTER (OUTPATIENT)
Dept: RADIOLOGY | Facility: HOSPITAL | Age: 77
Discharge: HOME | End: 2022-07-13
Attending: INTERNAL MEDICINE
Payer: MEDICARE

## 2022-07-13 DIAGNOSIS — K59.00 CONSTIPATION, UNSPECIFIED CONSTIPATION TYPE: Primary | ICD-10-CM

## 2022-07-13 DIAGNOSIS — K59.00 CONSTIPATION, UNSPECIFIED CONSTIPATION TYPE: ICD-10-CM

## 2022-07-13 PROCEDURE — 74018 RADEX ABDOMEN 1 VIEW: CPT | Mod: TC

## 2022-07-25 RX ORDER — SODIUM BICARBONATE 650 MG/1
1300 TABLET ORAL 2 TIMES DAILY
COMMUNITY

## 2022-07-28 ENCOUNTER — ANESTHESIA (OUTPATIENT)
Dept: SURGERY | Facility: HOSPITAL | Age: 77
End: 2022-07-28
Payer: MEDICARE

## 2022-07-28 ENCOUNTER — HOSPITAL ENCOUNTER (OUTPATIENT)
Facility: HOSPITAL | Age: 77
Discharge: HOME | End: 2022-07-28
Attending: SURGERY | Admitting: SURGERY
Payer: MEDICARE

## 2022-07-28 ENCOUNTER — ANESTHESIA EVENT (OUTPATIENT)
Dept: SURGERY | Facility: HOSPITAL | Age: 77
End: 2022-07-28
Payer: MEDICARE

## 2022-07-28 PROBLEM — T85.611A PERITONEAL DIALYSIS CATHETER DYSFUNCTION: Status: ACTIVE | Noted: 2022-07-28

## 2022-07-28 LAB
ANION GAP SERPL CALC-SCNC: 14 MMOL/L (ref 8–16)
BUN SERPL-MCNC: 98 MG/DL (ref 6–30)
CHLORIDE SERPL-SCNC: 108 MMOL/L (ref 95–110)
CREAT SERPL-MCNC: 5 MG/DL (ref 0.5–1.4)
GLUCOSE SERPL-MCNC: 100 MG/DL (ref 70–110)
HCT VFR BLD CALC: 31 %PCV (ref 36–54)
HGB BLD-MCNC: 11 G/DL
POC IONIZED CALCIUM: 1.12 MMOL/L (ref 1.06–1.42)
POC TCO2 (MEASURED): 23 MMOL/L (ref 23–29)
POTASSIUM BLD-SCNC: 5.6 MMOL/L (ref 3.5–5.1)
SAMPLE: ABNORMAL
SODIUM BLD-SCNC: 138 MMOL/L (ref 136–145)

## 2022-07-28 PROCEDURE — 63600175 PHARM REV CODE 636 W HCPCS: Performed by: ANESTHESIOLOGY

## 2022-07-28 PROCEDURE — 71000015 HC POSTOP RECOV 1ST HR: Performed by: SURGERY

## 2022-07-28 PROCEDURE — 36000709 HC OR TIME LEV III EA ADD 15 MIN: Performed by: SURGERY

## 2022-07-28 PROCEDURE — 25000003 PHARM REV CODE 250: Performed by: ANESTHESIOLOGY

## 2022-07-28 PROCEDURE — 71000016 HC POSTOP RECOV ADDL HR: Performed by: SURGERY

## 2022-07-28 PROCEDURE — 63600175 PHARM REV CODE 636 W HCPCS: Performed by: NURSE ANESTHETIST, CERTIFIED REGISTERED

## 2022-07-28 PROCEDURE — 71000039 HC RECOVERY, EACH ADD'L HOUR: Performed by: SURGERY

## 2022-07-28 PROCEDURE — 36000708 HC OR TIME LEV III 1ST 15 MIN: Performed by: SURGERY

## 2022-07-28 PROCEDURE — 25000003 PHARM REV CODE 250: Performed by: NURSE ANESTHETIST, CERTIFIED REGISTERED

## 2022-07-28 PROCEDURE — 25000003 PHARM REV CODE 250: Performed by: SURGERY

## 2022-07-28 PROCEDURE — 71000033 HC RECOVERY, INTIAL HOUR: Performed by: SURGERY

## 2022-07-28 PROCEDURE — 63600175 PHARM REV CODE 636 W HCPCS

## 2022-07-28 PROCEDURE — 63600175 PHARM REV CODE 636 W HCPCS: Performed by: SURGERY

## 2022-07-28 PROCEDURE — 27201423 OPTIME MED/SURG SUP & DEVICES STERILE SUPPLY: Performed by: SURGERY

## 2022-07-28 PROCEDURE — 37000008 HC ANESTHESIA 1ST 15 MINUTES: Performed by: SURGERY

## 2022-07-28 PROCEDURE — 37000009 HC ANESTHESIA EA ADD 15 MINS: Performed by: SURGERY

## 2022-07-28 RX ORDER — SODIUM CHLORIDE, SODIUM GLUCONATE, SODIUM ACETATE, POTASSIUM CHLORIDE AND MAGNESIUM CHLORIDE 30; 37; 368; 526; 502 MG/100ML; MG/100ML; MG/100ML; MG/100ML; MG/100ML
1000 INJECTION, SOLUTION INTRAVENOUS CONTINUOUS
Status: DISCONTINUED | OUTPATIENT
Start: 2022-07-28 | End: 2022-07-28 | Stop reason: HOSPADM

## 2022-07-28 RX ORDER — LEVOFLOXACIN 5 MG/ML
500 INJECTION, SOLUTION INTRAVENOUS
Status: COMPLETED | OUTPATIENT
Start: 2022-07-28 | End: 2022-07-28

## 2022-07-28 RX ORDER — ACETAMINOPHEN 10 MG/ML
INJECTION, SOLUTION INTRAVENOUS
Status: COMPLETED
Start: 2022-07-28 | End: 2022-07-28

## 2022-07-28 RX ORDER — DEXAMETHASONE SODIUM PHOSPHATE 4 MG/ML
INJECTION, SOLUTION INTRA-ARTICULAR; INTRALESIONAL; INTRAMUSCULAR; INTRAVENOUS; SOFT TISSUE
Status: DISCONTINUED | OUTPATIENT
Start: 2022-07-28 | End: 2022-07-28

## 2022-07-28 RX ORDER — HYDROCODONE BITARTRATE AND ACETAMINOPHEN 7.5; 325 MG/1; MG/1
1 TABLET ORAL EVERY 6 HOURS PRN
Status: DISCONTINUED | OUTPATIENT
Start: 2022-07-28 | End: 2022-07-28 | Stop reason: HOSPADM

## 2022-07-28 RX ORDER — METHOCARBAMOL 100 MG/ML
1000 INJECTION, SOLUTION INTRAMUSCULAR; INTRAVENOUS ONCE
Status: DISCONTINUED | OUTPATIENT
Start: 2022-07-28 | End: 2022-07-28 | Stop reason: HOSPADM

## 2022-07-28 RX ORDER — ONDANSETRON HYDROCHLORIDE 2 MG/ML
INJECTION, SOLUTION INTRAMUSCULAR; INTRAVENOUS
Status: DISCONTINUED | OUTPATIENT
Start: 2022-07-28 | End: 2022-07-28

## 2022-07-28 RX ORDER — BUPIVACAINE HCL/EPINEPHRINE 0.25-.0005
VIAL (ML) INJECTION
Status: DISCONTINUED
Start: 2022-07-28 | End: 2022-07-28 | Stop reason: HOSPADM

## 2022-07-28 RX ORDER — EPHEDRINE SULFATE 50 MG/ML
INJECTION, SOLUTION INTRAVENOUS
Status: DISCONTINUED | OUTPATIENT
Start: 2022-07-28 | End: 2022-07-28

## 2022-07-28 RX ORDER — TRAMADOL HYDROCHLORIDE 50 MG/1
50 TABLET ORAL EVERY 4 HOURS PRN
Status: DISCONTINUED | OUTPATIENT
Start: 2022-07-28 | End: 2022-07-28 | Stop reason: HOSPADM

## 2022-07-28 RX ORDER — MEPERIDINE HYDROCHLORIDE 25 MG/ML
50 INJECTION INTRAMUSCULAR; INTRAVENOUS; SUBCUTANEOUS
Status: DISCONTINUED | OUTPATIENT
Start: 2022-07-28 | End: 2022-07-28 | Stop reason: HOSPADM

## 2022-07-28 RX ORDER — ROCURONIUM BROMIDE 10 MG/ML
INJECTION, SOLUTION INTRAVENOUS
Status: DISCONTINUED | OUTPATIENT
Start: 2022-07-28 | End: 2022-07-28

## 2022-07-28 RX ORDER — KETOROLAC TROMETHAMINE 30 MG/ML
INJECTION, SOLUTION INTRAMUSCULAR; INTRAVENOUS
Status: DISCONTINUED
Start: 2022-07-28 | End: 2022-07-28 | Stop reason: HOSPADM

## 2022-07-28 RX ORDER — LIDOCAINE HYDROCHLORIDE 10 MG/ML
INJECTION, SOLUTION EPIDURAL; INFILTRATION; INTRACAUDAL; PERINEURAL
Status: DISCONTINUED | OUTPATIENT
Start: 2022-07-28 | End: 2022-07-28

## 2022-07-28 RX ORDER — MEPERIDINE HYDROCHLORIDE 25 MG/ML
12.5 INJECTION INTRAMUSCULAR; INTRAVENOUS; SUBCUTANEOUS ONCE
Status: COMPLETED | OUTPATIENT
Start: 2022-07-28 | End: 2022-07-28

## 2022-07-28 RX ORDER — HYDROMORPHONE HYDROCHLORIDE 2 MG/ML
0.4 INJECTION, SOLUTION INTRAMUSCULAR; INTRAVENOUS; SUBCUTANEOUS EVERY 5 MIN PRN
Status: COMPLETED | OUTPATIENT
Start: 2022-07-28 | End: 2022-07-28

## 2022-07-28 RX ORDER — BUPIVACAINE HYDROCHLORIDE AND EPINEPHRINE 2.5; 5 MG/ML; UG/ML
INJECTION, SOLUTION EPIDURAL; INFILTRATION; INTRACAUDAL; PERINEURAL
Status: DISCONTINUED | OUTPATIENT
Start: 2022-07-28 | End: 2022-07-28 | Stop reason: HOSPADM

## 2022-07-28 RX ORDER — HYDROMORPHONE HYDROCHLORIDE 2 MG/ML
0.4 INJECTION, SOLUTION INTRAMUSCULAR; INTRAVENOUS; SUBCUTANEOUS EVERY 5 MIN PRN
Status: DISCONTINUED | OUTPATIENT
Start: 2022-07-28 | End: 2022-07-28 | Stop reason: HOSPADM

## 2022-07-28 RX ORDER — SODIUM CHLORIDE 9 MG/ML
0-999 INJECTION, SOLUTION INTRAVENOUS CONTINUOUS
Status: DISCONTINUED | OUTPATIENT
Start: 2022-07-28 | End: 2022-07-28 | Stop reason: HOSPADM

## 2022-07-28 RX ORDER — MIDAZOLAM HYDROCHLORIDE 1 MG/ML
2 INJECTION INTRAMUSCULAR; INTRAVENOUS ONCE AS NEEDED
Status: COMPLETED | OUTPATIENT
Start: 2022-07-28 | End: 2022-07-28

## 2022-07-28 RX ORDER — FENTANYL CITRATE 50 UG/ML
INJECTION, SOLUTION INTRAMUSCULAR; INTRAVENOUS
Status: DISCONTINUED | OUTPATIENT
Start: 2022-07-28 | End: 2022-07-28

## 2022-07-28 RX ORDER — SODIUM CHLORIDE, SODIUM LACTATE, POTASSIUM CHLORIDE, CALCIUM CHLORIDE 600; 310; 30; 20 MG/100ML; MG/100ML; MG/100ML; MG/100ML
INJECTION, SOLUTION INTRAVENOUS CONTINUOUS
Status: DISCONTINUED | OUTPATIENT
Start: 2022-07-28 | End: 2022-07-28 | Stop reason: HOSPADM

## 2022-07-28 RX ORDER — PROPOFOL 10 MG/ML
VIAL (ML) INTRAVENOUS
Status: DISCONTINUED | OUTPATIENT
Start: 2022-07-28 | End: 2022-07-28

## 2022-07-28 RX ORDER — HYDROCODONE BITARTRATE AND ACETAMINOPHEN 5; 325 MG/1; MG/1
1 TABLET ORAL EVERY 6 HOURS PRN
Qty: 10 TABLET | Refills: 0 | Status: ON HOLD | OUTPATIENT
Start: 2022-07-28 | End: 2023-01-01 | Stop reason: HOSPADM

## 2022-07-28 RX ORDER — HYDROMORPHONE HYDROCHLORIDE 2 MG/ML
INJECTION, SOLUTION INTRAMUSCULAR; INTRAVENOUS; SUBCUTANEOUS
Status: COMPLETED
Start: 2022-07-28 | End: 2022-07-28

## 2022-07-28 RX ORDER — ONDANSETRON 2 MG/ML
4 INJECTION INTRAMUSCULAR; INTRAVENOUS EVERY 4 HOURS PRN
Status: DISCONTINUED | OUTPATIENT
Start: 2022-07-28 | End: 2022-07-28 | Stop reason: HOSPADM

## 2022-07-28 RX ORDER — SODIUM CHLORIDE 0.9 % (FLUSH) 0.9 %
10 SYRINGE (ML) INJECTION
Status: DISCONTINUED | OUTPATIENT
Start: 2022-07-28 | End: 2022-07-28 | Stop reason: HOSPADM

## 2022-07-28 RX ORDER — ONDANSETRON 2 MG/ML
4 INJECTION INTRAMUSCULAR; INTRAVENOUS ONCE
Status: COMPLETED | OUTPATIENT
Start: 2022-07-28 | End: 2022-07-28

## 2022-07-28 RX ADMIN — ROCURONIUM BROMIDE 30 MG: 10 SOLUTION INTRAVENOUS at 10:07

## 2022-07-28 RX ADMIN — EPHEDRINE SULFATE 10 MG: 50 INJECTION INTRAVENOUS at 11:07

## 2022-07-28 RX ADMIN — SUGAMMADEX 150 MG: 100 INJECTION, SOLUTION INTRAVENOUS at 11:07

## 2022-07-28 RX ADMIN — HYDROMORPHONE HYDROCHLORIDE 0.4 MG: 2 INJECTION, SOLUTION INTRAMUSCULAR; INTRAVENOUS; SUBCUTANEOUS at 12:07

## 2022-07-28 RX ADMIN — PROPOFOL 175 MG: 10 INJECTION, EMULSION INTRAVENOUS at 10:07

## 2022-07-28 RX ADMIN — ACETAMINOPHEN: 10 INJECTION, SOLUTION INTRAVENOUS at 01:07

## 2022-07-28 RX ADMIN — FENTANYL CITRATE 25 MCG: 50 INJECTION, SOLUTION INTRAMUSCULAR; INTRAVENOUS at 12:07

## 2022-07-28 RX ADMIN — HYDROMORPHONE HYDROCHLORIDE 0.4 MG: 2 INJECTION, SOLUTION INTRAMUSCULAR; INTRAVENOUS; SUBCUTANEOUS at 01:07

## 2022-07-28 RX ADMIN — FENTANYL CITRATE 50 MCG: 50 INJECTION, SOLUTION INTRAMUSCULAR; INTRAVENOUS at 10:07

## 2022-07-28 RX ADMIN — ONDANSETRON 4 MG: 2 INJECTION INTRAMUSCULAR; INTRAVENOUS at 11:07

## 2022-07-28 RX ADMIN — DEXAMETHASONE SODIUM PHOSPHATE 4 MG: 4 INJECTION, SOLUTION INTRA-ARTICULAR; INTRALESIONAL; INTRAMUSCULAR; INTRAVENOUS; SOFT TISSUE at 11:07

## 2022-07-28 RX ADMIN — ONDANSETRON 4 MG: 2 INJECTION INTRAMUSCULAR; INTRAVENOUS at 12:07

## 2022-07-28 RX ADMIN — MEPERIDINE HYDROCHLORIDE 12.5 MG: 25 INJECTION INTRAMUSCULAR; INTRAVENOUS; SUBCUTANEOUS at 12:07

## 2022-07-28 RX ADMIN — MIDAZOLAM HYDROCHLORIDE 2 MG: 1 INJECTION, SOLUTION INTRAMUSCULAR; INTRAVENOUS at 10:07

## 2022-07-28 RX ADMIN — SODIUM CHLORIDE 20 ML/HR: 9 INJECTION, SOLUTION INTRAVENOUS at 09:07

## 2022-07-28 RX ADMIN — FENTANYL CITRATE 25 MCG: 50 INJECTION, SOLUTION INTRAMUSCULAR; INTRAVENOUS at 11:07

## 2022-07-28 RX ADMIN — EPHEDRINE SULFATE 10 MG: 50 INJECTION, SOLUTION INTRAVENOUS at 11:07

## 2022-07-28 RX ADMIN — LIDOCAINE HYDROCHLORIDE 50 MG: 10 INJECTION, SOLUTION EPIDURAL; INFILTRATION; INTRACAUDAL; PERINEURAL at 10:07

## 2022-07-28 RX ADMIN — LEVOFLOXACIN 500 MG: 500 INJECTION, SOLUTION INTRAVENOUS at 09:07

## 2022-07-28 NOTE — PROGRESS NOTES
Reported pain level and situation to Corazon Quintanilla NP, no new pain orders at this time, continue to monitor.

## 2022-07-28 NOTE — DISCHARGE SUMMARY
[facility] Surgical - Periop Services  Discharge Note  Short Stay    Procedure(s) (LRB):  LAPAROSCOPY, DIAGNOSTIC (N/A)    OUTCOME: Patient tolerated treatment/procedure well without complication and is now ready for discharge.    DISPOSITION: Home or Self Care    FINAL DIAGNOSIS:  Peritoneal dialysis catheter dysfunction    FOLLOWUP: In clinic    DISCHARGE INSTRUCTIONS:  No discharge procedures on file.     TIME SPENT ON DISCHARGE:    minutes

## 2022-07-28 NOTE — ANESTHESIA PROCEDURE NOTES
Intubation    Date/Time: 7/28/2022 10:47 AM  Performed by: Red Glynn CRNA  Authorized by: Matthew Robles MD     Intubation:     Induction:  Intravenous    Intubated:  Postinduction    Mask Ventilation:  Easy mask    Attempts:  1    Attempted By:  CRNA    Method of Intubation:  Direct    Blade:  Good 2    Laryngeal View Grade: Grade I - full view of cords      Difficult Airway Encountered?: No      Complications:  None    Airway Device Size:  6.5    Style/Cuff Inflation:  Cuffed    Tube secured:  20    Secured at:  The lips    Placement Verified By:  Capnometry    Complicating Factors:  None    Findings Post-Intubation:  BS equal bilateral

## 2022-07-28 NOTE — PLAN OF CARE
VSS, juan luis score 10, pt arousable [facility]. Dr ellison rounding before dc from pacu, no new orders.

## 2022-07-28 NOTE — PROGRESS NOTES
Dr Robles at bedside to assess pain unrelieved with medications. Pt turned to left side, new order for ofirmev given.

## 2022-07-28 NOTE — ANESTHESIA POSTPROCEDURE EVALUATION
Anesthesia Post Evaluation    Patient: Deepti Zambrano    Procedure(s) Performed: Procedure(s) (LRB):  LAPAROSCOPY, DIAGNOSTIC (N/A)    Final Anesthesia Type: general      Patient location during evaluation: PACU  Patient participation: No - Unable to Participate, Sedation  Level of consciousness: sedated  Post-procedure vital signs: reviewed and stable  Pain management: adequate  Airway patency: patent  THUY mitigation strategies: Multimodal analgesia  PONV status at discharge: No PONV  Anesthetic complications: no      Cardiovascular status: stable  Respiratory status: nasal cannula  Hydration status: euvolemic  Follow-up not needed.          Vitals Value Taken Time   /67 07/28/22 0817   Temp 36.6 °C (97.9 °F) 07/28/22 0817   Pulse 70 07/28/22 0817   Resp 20 07/28/22 0817   SpO2 98 % 07/28/22 0817         No case tracking events are documented in the log.      Pain/Caroline Score: No data recorded

## 2022-07-28 NOTE — ANESTHESIA PREPROCEDURE EVALUATION
"                                                                                                             07/28/2022  Deepti Zambrano is a 77 y.o., female   Pre-operative evaluation for Procedure(s) (LRB):  LAPAROSCOPY, DIAGNOSTIC (N/A)    BP (!) 164/67 (BP Location: Right arm)   Pulse 70   Temp 36.6 °C (97.9 °F)   Resp 20   Ht 5' 1" (1.549 m)   Wt 63.8 kg (140 lb 10.5 oz)   SpO2 98%   Breastfeeding No   BMI 26.58 kg/m²     There is no problem list on file for this patient.      Review of patient's allergies indicates:   Allergen Reactions    Ciprofloxacin Itching    Codeine Itching    Penicillins Itching       Current Outpatient Medications   Medication Instructions    allopurinoL (ZYLOPRIM) 100 mg, Oral, Daily    amitriptyline (ELAVIL) 25 mg, Oral, Nightly    amLODIPine (NORVASC) 10 mg, Oral, Daily    atorvastatin (LIPITOR) 40 mg, Oral, Daily    clopidogreL (PLAVIX) 75 mg, Oral, Once    furosemide (LASIX) 20 mg, Oral, Daily    hydrALAZINE (APRESOLINE) 50 mg, Oral, 3 times daily    HYDROcodone-acetaminophen (NORCO) 5-325 mg per tablet 1 tablet, Oral, Every 6 hours PRN    metoprolol succinate (TOPROL-XL) 50 mg, Oral, Daily    pantoprazole (PROTONIX) 40 mg, Oral, Daily    sevelamer carbonate (RENVELA) 800 mg, Oral, 3 times daily with meals    sodium bicarbonate 650 mg, Oral, 3 times daily       Past Surgical History:   Procedure Laterality Date    AV FISTULA PLACEMENT Left     BACK SURGERY      CATARACT EXTRACTION W/  INTRAOCULAR LENS IMPLANT Bilateral     CHOLECYSTECTOMY      CORONARY ANGIOPLASTY WITH STENT PLACEMENT      Dr. Strickland 18 years ago    HYSTERECTOMY      NECK SURGERY      PERITONEAL CATHETER INSERTION      right kidney removed Right     TONSILLECTOMY         Social History     Socioeconomic History    Marital status:    Tobacco Use    Smoking status: Former Smoker    Smokeless tobacco: Never Used   Substance and Sexual Activity    Alcohol use: Never "    Drug use: Never    Sexual activity: Never       Lab Results   Component Value Date    WBC 10.5 06/17/2022    HGB 10.9 (L) 06/17/2022    HCT 38 06/22/2022    .8 (H) 06/17/2022     06/17/2022          BMP  Lab Results   Component Value Date     06/17/2022    K 5.1 06/17/2022    CHLORIDE 103 06/17/2022    CO2 20 (L) 06/17/2022    GLUCOSE 131 (H) 06/17/2022    BUN 88.5 (H) 06/17/2022    CREATININE 4.54 (H) 06/17/2022    CALCIUM 9.0 06/17/2022    EGFRNONAA 10 06/17/2022        INR  No results for input(s): PT, INR, PROTIME, APTT in the last 72 hours.        Diagnostic Studies:      EKG:  Results for orders placed or performed in visit on 06/17/22   EKG 12-lead    Collection Time: 06/17/22 10:26 AM    Narrative    Test Reason : Z01.818,    Vent. Rate : 066 BPM     Atrial Rate : 066 BPM     P-R Int : 188 ms          QRS Dur : 086 ms      QT Int : 418 ms       P-R-T Axes : 052 064 083 degrees     QTc Int : 438 ms    Normal sinus rhythm  ST abnormality, possible digitalis effect  Abnormal ECG  Confirmed by Maximiliano Elena MD ([id number]) on 6/17/2022 3:56:44 PM    Referred By: TORITO SWIFT JR.           Confirmed By:Maximiliano Elena MD     Last Dialysis Monday    PD Cath Malfunction  .      Pre-op Assessment          Review of Systems  Cardiovascular:   Hypertension CAD  CABG/stent     Renal/:   Chronic Renal Disease, ESRD, Dialysis    Neurological:   CVA        Physical Exam  General: Alert and Oriented    Airway:  Mallampati: II   Mouth Opening: Normal  TM Distance: Normal  Tongue: Normal  Neck ROM: Normal ROM    Dental:  Edentulous    Chest/Lungs:  Clear to auscultation, Normal Respiratory Rate    Heart:  Rate: Normal  Rhythm: Regular Rhythm        Anesthesia Plan  Type of Anesthesia, risks & benefits discussed:    Anesthesia Type: Gen ETT  Intra-op Monitoring Plan: Standard ASA Monitors  Post Op Pain Control Plan: multimodal analgesia  Induction:  IV and Inhalation  Airway Plan: Direct,  Post-Induction  Informed Consent: Informed consent signed with the Patient and all parties understand the risks and agree with anesthesia plan.  All questions answered. Patient consented to blood products? No  ASA Score: 3  Day of Surgery Review of History & Physical: H&P Update referred to the surgeon/provider.  Anesthesia Plan Notes: Discussed Anesthetic Plan w/ Pt/Family. Questions Entertained. Accepted.    Ready For Surgery From Anesthesia Perspective.     .

## 2022-07-30 VITALS
BODY MASS INDEX: 26.55 KG/M2 | RESPIRATION RATE: 16 BRPM | TEMPERATURE: 97 F | HEART RATE: 62 BPM | DIASTOLIC BLOOD PRESSURE: 52 MMHG | WEIGHT: 140.63 LBS | OXYGEN SATURATION: 99 % | HEIGHT: 61 IN | SYSTOLIC BLOOD PRESSURE: 110 MMHG

## 2022-12-26 NOTE — OP NOTE
Patient:  Deepti Zambrano        :  1945         Date of Surgery:  2022          SURGEON:  Edmund Echols MD           ASSISTANT:  Maria Isabel Price NP          PREOPERATIVE DIAGNOSIS:  1. Chronic Renal Failure            2.  Peritoneal Catheter dysfunction          POSTOPERATIVE DIAGNOSIS:  Same.           OPERATIONS:  Laparoscopic  Exploration with Peritoneal Dialysis Catheter evaluation        Anesthesia:  General endotracheal anesthesia      Estimated blood loss:  minimal        Blood administered:  None      Lap and instrument counts correct x 2 at the end of the case.             INDICATIONS/SIGNIFICANT HISTORY:  The patient is a 77 y.o.  year old female who was referred for evaluation of peritoneal dialysis catheter dysfunction.   Risks and Benefits of surgery was discussed with the patient, who voiced understanding of risks and benefits and elected to proceed with surgery.           PROCEDURE IN DETAIL:  Once informed consents were obtained, the patient was taken to the operating room and placed supine on the operating table.  After general endotracheal anesthesia was induced, the abdomen was prepped and draped in a standard surgical fashion.  A 5mm incision was made in the Left upper and a Visiport trocar was used to enter the abdominal cavity.  Pneumoperitoneum was created with insufflation.  After adequate insufflation, the 5mm scope was used to visualized the intra-abdominal compartment.  The catheter was visualized and appeared to be surrounded by peritoneal adhesion which was taken down with sharp dissection.   The catheter was then replaced back into the pelvis.  No bleeding was appreciated.  The scope and port was then removed along with insufflation.  The skin was closed with a 4-0 Vicryl suture in an interrupted fashion.  The patient tolerated the procedure well and was transported to recovery room in good condition.

## 2023-01-01 ENCOUNTER — HOSPITAL ENCOUNTER (OUTPATIENT)
Dept: RADIOLOGY | Facility: HOSPITAL | Age: 78
Discharge: HOME | End: 2023-05-23
Attending: SURGERY
Payer: MEDICARE

## 2023-01-01 ENCOUNTER — HOSPITAL ENCOUNTER (OUTPATIENT)
Facility: HOSPITAL | Age: 78
Discharge: HOME | End: 2023-11-22
Attending: INTERNAL MEDICINE | Admitting: INTERNAL MEDICINE
Payer: MEDICARE

## 2023-01-01 ENCOUNTER — ANESTHESIA EVENT (OUTPATIENT)
Dept: ENDOSCOPY | Facility: HOSPITAL | Age: 78
DRG: 377 | End: 2023-01-01
Payer: MEDICARE

## 2023-01-01 ENCOUNTER — ANESTHESIA (OUTPATIENT)
Dept: ENDOSCOPY | Facility: HOSPITAL | Age: 78
DRG: 377 | End: 2023-01-01
Payer: MEDICARE

## 2023-01-01 ENCOUNTER — ANESTHESIA EVENT (OUTPATIENT)
Dept: SURGERY | Facility: HOSPITAL | Age: 78
End: 2023-01-01
Payer: MEDICARE

## 2023-01-01 ENCOUNTER — ANESTHESIA EVENT (OUTPATIENT)
Dept: ENDOSCOPY | Facility: HOSPITAL | Age: 78
End: 2023-01-01
Payer: MEDICARE

## 2023-01-01 ENCOUNTER — OFFICE VISIT (OUTPATIENT)
Dept: SURGERY | Facility: CLINIC | Age: 78
End: 2023-01-01
Payer: MEDICARE

## 2023-01-01 ENCOUNTER — HOSPITAL ENCOUNTER (OUTPATIENT)
Facility: HOSPITAL | Age: 78
Discharge: HOME | End: 2023-05-15
Attending: EMERGENCY MEDICINE | Admitting: STUDENT IN AN ORGANIZED HEALTH CARE EDUCATION/TRAINING PROGRAM
Payer: MEDICARE

## 2023-01-01 ENCOUNTER — ANESTHESIA (OUTPATIENT)
Dept: ENDOSCOPY | Facility: HOSPITAL | Age: 78
End: 2023-01-01
Payer: MEDICARE

## 2023-01-01 ENCOUNTER — TELEPHONE (OUTPATIENT)
Dept: SURGERY | Facility: CLINIC | Age: 78
End: 2023-01-01
Payer: MEDICARE

## 2023-01-01 ENCOUNTER — ANESTHESIA (OUTPATIENT)
Dept: SURGERY | Facility: HOSPITAL | Age: 78
End: 2023-01-01
Payer: MEDICARE

## 2023-01-01 ENCOUNTER — HOSPITAL ENCOUNTER (INPATIENT)
Facility: HOSPITAL | Age: 78
LOS: 1 days | Discharge: HOME | DRG: 377 | End: 2023-08-25
Attending: EMERGENCY MEDICINE | Admitting: INTERNAL MEDICINE
Payer: MEDICARE

## 2023-01-01 ENCOUNTER — HOSPITAL ENCOUNTER (OUTPATIENT)
Facility: HOSPITAL | Age: 78
Discharge: HOME | End: 2023-09-28
Attending: SURGERY | Admitting: SURGERY
Payer: MEDICARE

## 2023-01-01 ENCOUNTER — PATIENT OUTREACH (OUTPATIENT)
Dept: ADMINISTRATIVE | Facility: CLINIC | Age: 78
End: 2023-01-01
Payer: MEDICARE

## 2023-01-01 ENCOUNTER — HOSPITAL ENCOUNTER (OUTPATIENT)
Facility: HOSPITAL | Age: 78
Discharge: HOME | End: 2023-05-30
Attending: SURGERY | Admitting: SURGERY
Payer: MEDICARE

## 2023-01-01 VITALS
BODY MASS INDEX: 24.72 KG/M2 | TEMPERATURE: 98 F | SYSTOLIC BLOOD PRESSURE: 106 MMHG | HEIGHT: 61 IN | DIASTOLIC BLOOD PRESSURE: 45 MMHG | OXYGEN SATURATION: 92 % | HEART RATE: 62 BPM | WEIGHT: 130.94 LBS | RESPIRATION RATE: 18 BRPM

## 2023-01-01 VITALS
BODY MASS INDEX: 25.3 KG/M2 | RESPIRATION RATE: 18 BRPM | HEIGHT: 61 IN | OXYGEN SATURATION: 90 % | TEMPERATURE: 98 F | WEIGHT: 134 LBS | DIASTOLIC BLOOD PRESSURE: 59 MMHG | HEART RATE: 82 BPM | SYSTOLIC BLOOD PRESSURE: 123 MMHG

## 2023-01-01 VITALS
HEART RATE: 65 BPM | RESPIRATION RATE: 17 BRPM | SYSTOLIC BLOOD PRESSURE: 165 MMHG | OXYGEN SATURATION: 100 % | WEIGHT: 122.81 LBS | BODY MASS INDEX: 23.19 KG/M2 | HEIGHT: 61 IN | DIASTOLIC BLOOD PRESSURE: 76 MMHG | TEMPERATURE: 98 F

## 2023-01-01 VITALS
DIASTOLIC BLOOD PRESSURE: 44 MMHG | SYSTOLIC BLOOD PRESSURE: 154 MMHG | BODY MASS INDEX: 25.1 KG/M2 | OXYGEN SATURATION: 93 % | TEMPERATURE: 98 F | RESPIRATION RATE: 18 BRPM | HEART RATE: 111 BPM | WEIGHT: 132.94 LBS | HEIGHT: 61 IN

## 2023-01-01 VITALS
HEART RATE: 83 BPM | HEIGHT: 61 IN | OXYGEN SATURATION: 96 % | RESPIRATION RATE: 20 BRPM | BODY MASS INDEX: 23.22 KG/M2 | SYSTOLIC BLOOD PRESSURE: 136 MMHG | DIASTOLIC BLOOD PRESSURE: 74 MMHG | TEMPERATURE: 97 F | WEIGHT: 123 LBS

## 2023-01-01 VITALS
HEART RATE: 74 BPM | SYSTOLIC BLOOD PRESSURE: 136 MMHG | HEIGHT: 61 IN | WEIGHT: 122 LBS | BODY MASS INDEX: 23.03 KG/M2 | TEMPERATURE: 99 F | DIASTOLIC BLOOD PRESSURE: 70 MMHG

## 2023-01-01 DIAGNOSIS — D62 ACUTE BLOOD LOSS ANEMIA: ICD-10-CM

## 2023-01-01 DIAGNOSIS — Z98.890 POST-OPERATIVE STATE: Primary | ICD-10-CM

## 2023-01-01 DIAGNOSIS — U07.1 COVID-19 VIRUS INFECTION: ICD-10-CM

## 2023-01-01 DIAGNOSIS — Z99.2 DIALYSIS PATIENT: ICD-10-CM

## 2023-01-01 DIAGNOSIS — N18.6 ESRD (END STAGE RENAL DISEASE) ON DIALYSIS: ICD-10-CM

## 2023-01-01 DIAGNOSIS — R06.09 EXERTIONAL DYSPNEA: ICD-10-CM

## 2023-01-01 DIAGNOSIS — R53.1 WEAKNESS: ICD-10-CM

## 2023-01-01 DIAGNOSIS — R53.1 WEAKNESS GENERALIZED: ICD-10-CM

## 2023-01-01 DIAGNOSIS — K92.1 MELENA: ICD-10-CM

## 2023-01-01 DIAGNOSIS — R79.89 ELEVATED TROPONIN I LEVEL: Primary | ICD-10-CM

## 2023-01-01 DIAGNOSIS — K92.2 GASTROINTESTINAL HEMORRHAGE, UNSPECIFIED GASTROINTESTINAL HEMORRHAGE TYPE: Primary | ICD-10-CM

## 2023-01-01 DIAGNOSIS — Z99.2 ESRD (END STAGE RENAL DISEASE) ON DIALYSIS: ICD-10-CM

## 2023-01-01 DIAGNOSIS — Z01.818 OTHER SPECIFIED PRE-OPERATIVE EXAMINATION: ICD-10-CM

## 2023-01-01 LAB
ABORH RETYPE: NORMAL
ALBUMIN SERPL-MCNC: 2.6 G/DL (ref 3.4–4.8)
ALBUMIN SERPL-MCNC: 2.8 G/DL (ref 3.4–4.8)
ALBUMIN SERPL-MCNC: 3.2 G/DL (ref 3.4–4.8)
ALBUMIN SERPL-MCNC: 3.4 G/DL (ref 3.4–4.8)
ALBUMIN SERPL-MCNC: 3.6 G/DL (ref 3.4–4.8)
ALBUMIN/GLOB SERPL: 0.9 RATIO (ref 1.1–2)
ALBUMIN/GLOB SERPL: 1 RATIO (ref 1.1–2)
ALBUMIN/GLOB SERPL: 1.1 RATIO (ref 1.1–2)
ALBUMIN/GLOB SERPL: 1.2 RATIO (ref 1.1–2)
ALP SERPL-CCNC: 70 UNIT/L (ref 40–150)
ALP SERPL-CCNC: 76 UNIT/L (ref 40–150)
ALP SERPL-CCNC: 83 UNIT/L (ref 40–150)
ALP SERPL-CCNC: 92 UNIT/L (ref 40–150)
ALT SERPL-CCNC: 13 UNIT/L (ref 0–55)
ALT SERPL-CCNC: 14 UNIT/L (ref 0–55)
ALT SERPL-CCNC: 16 UNIT/L (ref 0–55)
ALT SERPL-CCNC: 22 UNIT/L (ref 0–55)
ANION GAP SERPL CALC-SCNC: 11 MMOL/L (ref 8–16)
ANION GAP SERPL CALC-SCNC: 13 MEQ/L
ANION GAP SERPL CALC-SCNC: 17 MMOL/L (ref 8–16)
APPEARANCE UR: CLEAR
APTT PPP: 32.9 SECONDS (ref 23.2–33.7)
AST SERPL-CCNC: 19 UNIT/L (ref 5–34)
AST SERPL-CCNC: 21 UNIT/L (ref 5–34)
AST SERPL-CCNC: 25 UNIT/L (ref 5–34)
AST SERPL-CCNC: 27 UNIT/L (ref 5–34)
AV INDEX (PROSTH): 0.66
AV MEAN GRADIENT: 7 MMHG
AV PEAK GRADIENT: 12 MMHG
AV VELOCITY RATIO: 0.62
BACTERIA #/AREA URNS AUTO: ABNORMAL /HPF
BASOPHILS # BLD AUTO: 0.05 X10(3)/MCL
BASOPHILS # BLD AUTO: 0.05 X10(3)/MCL
BASOPHILS # BLD AUTO: 0.06 X10(3)/MCL
BASOPHILS # BLD AUTO: 0.1 X10(3)/MCL
BASOPHILS # BLD AUTO: 0.11 X10(3)/MCL
BASOPHILS NFR BLD AUTO: 0.4 %
BASOPHILS NFR BLD AUTO: 0.5 %
BASOPHILS NFR BLD AUTO: 0.6 %
BASOPHILS NFR BLD AUTO: 1 %
BASOPHILS NFR BLD AUTO: 1.1 %
BILIRUB SERPL-MCNC: 0.4 MG/DL
BILIRUB SERPL-MCNC: 0.4 MG/DL
BILIRUB UR QL STRIP.AUTO: NEGATIVE MG/DL
BILIRUBIN DIRECT+TOT PNL SERPL-MCNC: 0.3 MG/DL
BILIRUBIN DIRECT+TOT PNL SERPL-MCNC: 0.3 MG/DL
BUN SERPL-MCNC: 13 MG/DL (ref 6–30)
BUN SERPL-MCNC: 26.5 MG/DL (ref 9.8–20.1)
BUN SERPL-MCNC: 39.4 MG/DL (ref 9.8–20.1)
BUN SERPL-MCNC: 40 MG/DL (ref 6–30)
BUN SERPL-MCNC: 43.2 MG/DL (ref 9.8–20.1)
BUN SERPL-MCNC: 61.3 MG/DL (ref 9.8–20.1)
BUN SERPL-MCNC: 62.6 MG/DL (ref 9.8–20.1)
BUN SERPL-MCNC: 64.2 MG/DL (ref 9.8–20.1)
CALCIUM SERPL-MCNC: 8.2 MG/DL (ref 8.4–10.2)
CALCIUM SERPL-MCNC: 8.3 MG/DL (ref 8.4–10.2)
CALCIUM SERPL-MCNC: 8.9 MG/DL (ref 8.4–10.2)
CALCIUM SERPL-MCNC: 9 MG/DL (ref 8.4–10.2)
CALCIUM SERPL-MCNC: 9.1 MG/DL (ref 8.4–10.2)
CALCIUM SERPL-MCNC: 9.9 MG/DL (ref 8.4–10.2)
CHLORIDE SERPL-SCNC: 103 MMOL/L (ref 95–110)
CHLORIDE SERPL-SCNC: 106 MMOL/L (ref 98–107)
CHLORIDE SERPL-SCNC: 107 MMOL/L (ref 98–107)
CHLORIDE SERPL-SCNC: 109 MMOL/L (ref 98–107)
CHLORIDE SERPL-SCNC: 110 MMOL/L (ref 98–107)
CHLORIDE SERPL-SCNC: 98 MMOL/L (ref 95–110)
CO2 SERPL-SCNC: 20 MMOL/L (ref 23–31)
CO2 SERPL-SCNC: 21 MMOL/L (ref 23–31)
CO2 SERPL-SCNC: 22 MMOL/L (ref 23–31)
CO2 SERPL-SCNC: 22 MMOL/L (ref 23–31)
CO2 SERPL-SCNC: 25 MMOL/L (ref 23–31)
CO2 SERPL-SCNC: 26 MMOL/L (ref 23–31)
COLOR UR: YELLOW
CREAT SERPL-MCNC: 2.75 MG/DL (ref 0.55–1.02)
CREAT SERPL-MCNC: 3.2 MG/DL (ref 0.5–1.4)
CREAT SERPL-MCNC: 4.13 MG/DL (ref 0.55–1.02)
CREAT SERPL-MCNC: 4.37 MG/DL (ref 0.55–1.02)
CREAT SERPL-MCNC: 4.92 MG/DL (ref 0.55–1.02)
CREAT SERPL-MCNC: 4.96 MG/DL (ref 0.55–1.02)
CREAT SERPL-MCNC: 5 MG/DL (ref 0.5–1.4)
CREAT SERPL-MCNC: 5.29 MG/DL (ref 0.55–1.02)
CREAT/UREA NIT SERPL: 13
CRP SERPL-MCNC: 11 MG/L
CV ECHO LV RWT: 0.33 CM
DOP CALC AO PEAK VEL: 1.75 M/S
DOP CALC AO VTI: 30.3 CM
DOP CALC LVOT PEAK VEL: 1.08 M/S
DOP CALCLVOT PEAK VEL VTI: 20 CM
E WAVE DECELERATION TIME: 187 MSEC
E/A RATIO: 0.81
E/E' RATIO: 15.85 M/S
ECHO LV POSTERIOR WALL: 0.85 CM (ref 0.6–1.1)
EJECTION FRACTION: 40 %
EOSINOPHIL # BLD AUTO: 0.17 X10(3)/MCL (ref 0–0.9)
EOSINOPHIL # BLD AUTO: 0.18 X10(3)/MCL (ref 0–0.9)
EOSINOPHIL # BLD AUTO: 0.26 X10(3)/MCL (ref 0–0.9)
EOSINOPHIL # BLD AUTO: 0.39 X10(3)/MCL (ref 0–0.9)
EOSINOPHIL # BLD AUTO: 0.56 X10(3)/MCL (ref 0–0.9)
EOSINOPHIL NFR BLD AUTO: 1.5 %
EOSINOPHIL NFR BLD AUTO: 1.8 %
EOSINOPHIL NFR BLD AUTO: 2.6 %
EOSINOPHIL NFR BLD AUTO: 3.9 %
EOSINOPHIL NFR BLD AUTO: 5.8 %
ERYTHROCYTE [DISTWIDTH] IN BLOOD BY AUTOMATED COUNT: 15.5 % (ref 11.5–17)
ERYTHROCYTE [DISTWIDTH] IN BLOOD BY AUTOMATED COUNT: 15.7 % (ref 11.5–17)
ERYTHROCYTE [DISTWIDTH] IN BLOOD BY AUTOMATED COUNT: 17 % (ref 11.5–17)
ERYTHROCYTE [DISTWIDTH] IN BLOOD BY AUTOMATED COUNT: 17.1 % (ref 11.5–17)
ERYTHROCYTE [DISTWIDTH] IN BLOOD BY AUTOMATED COUNT: 17.1 % (ref 11.5–17)
ERYTHROCYTE [SEDIMENTATION RATE] IN BLOOD: 63 MM/HR (ref 0–20)
FOLATE SERPL-MCNC: 19.2 NG/ML (ref 7–31.4)
FRACTIONAL SHORTENING: 22 % (ref 28–44)
GFR SERPLBLD CREATININE-BSD FMLA CKD-EPI: 10 MLS/MIN/1.73/M2
GFR SERPLBLD CREATININE-BSD FMLA CKD-EPI: 11 MLS/MIN/1.73/M2
GFR SERPLBLD CREATININE-BSD FMLA CKD-EPI: 17 MLS/MIN/1.73/M2
GFR SERPLBLD CREATININE-BSD FMLA CKD-EPI: 8 MLS/MIN/1.73/M2
GFR SERPLBLD CREATININE-BSD FMLA CKD-EPI: 9 MLS/MIN/1.73/M2
GFR SERPLBLD CREATININE-BSD FMLA CKD-EPI: 9 MLS/MIN/1.73/M2
GLOBULIN SER-MCNC: 2.6 GM/DL (ref 2.4–3.5)
GLOBULIN SER-MCNC: 2.9 GM/DL (ref 2.4–3.5)
GLOBULIN SER-MCNC: 3.3 GM/DL (ref 2.4–3.5)
GLOBULIN SER-MCNC: 3.6 GM/DL (ref 2.4–3.5)
GLUCOSE SERPL-MCNC: 111 MG/DL (ref 70–110)
GLUCOSE SERPL-MCNC: 133 MG/DL (ref 82–115)
GLUCOSE SERPL-MCNC: 82 MG/DL (ref 82–115)
GLUCOSE SERPL-MCNC: 89 MG/DL (ref 82–115)
GLUCOSE SERPL-MCNC: 90 MG/DL (ref 82–115)
GLUCOSE SERPL-MCNC: 91 MG/DL (ref 82–115)
GLUCOSE SERPL-MCNC: 93 MG/DL (ref 70–110)
GLUCOSE SERPL-MCNC: 97 MG/DL (ref 82–115)
GLUCOSE UR QL STRIP.AUTO: NEGATIVE MG/DL
GROUP & RH: NORMAL
HBV SURFACE AB SER QL IA: POSITIVE
HBV SURFACE AB SERPL IA-ACNC: 33.3 MIU/ML
HBV SURFACE AG SERPL QL IA: NONREACTIVE
HCT VFR BLD AUTO: 28.4 % (ref 37–47)
HCT VFR BLD AUTO: 29.6 % (ref 37–47)
HCT VFR BLD AUTO: 33.7 % (ref 37–47)
HCT VFR BLD AUTO: 34.2 % (ref 37–47)
HCT VFR BLD AUTO: 35.3 % (ref 37–47)
HCT VFR BLD AUTO: 37.1 % (ref 37–47)
HCT VFR BLD CALC: 30 %PCV (ref 36–54)
HCT VFR BLD CALC: 36 %PCV (ref 36–54)
HGB BLD-MCNC: 10 G/DL
HGB BLD-MCNC: 10.5 G/DL (ref 12–16)
HGB BLD-MCNC: 10.9 G/DL (ref 12–16)
HGB BLD-MCNC: 11.3 G/DL (ref 12–16)
HGB BLD-MCNC: 11.9 G/DL (ref 12–16)
HGB BLD-MCNC: 12 G/DL
HGB BLD-MCNC: 8.7 G/DL (ref 12–16)
HGB BLD-MCNC: 9.1 G/DL (ref 12–16)
IMM GRANULOCYTES # BLD AUTO: 0.03 X10(3)/MCL (ref 0–0.04)
IMM GRANULOCYTES # BLD AUTO: 0.04 X10(3)/MCL (ref 0–0.04)
IMM GRANULOCYTES # BLD AUTO: 0.09 X10(3)/MCL (ref 0–0.04)
IMM GRANULOCYTES # BLD AUTO: 0.14 X10(3)/MCL (ref 0–0.04)
IMM GRANULOCYTES # BLD AUTO: 0.17 X10(3)/MCL (ref 0–0.04)
IMM GRANULOCYTES NFR BLD AUTO: 0.3 %
IMM GRANULOCYTES NFR BLD AUTO: 0.4 %
IMM GRANULOCYTES NFR BLD AUTO: 0.9 %
IMM GRANULOCYTES NFR BLD AUTO: 1.4 %
IMM GRANULOCYTES NFR BLD AUTO: 1.5 %
INDIRECT COOMBS GEL: NORMAL
INR BLD: 1.02 (ref 0–1.3)
INR PPP: 1
INTERVENTRICULAR SEPTUM: 1.1 CM (ref 0.6–1.1)
KETONES UR QL STRIP.AUTO: NEGATIVE MG/DL
LEFT ATRIUM SIZE: 4.1 CM
LEFT ATRIUM VOLUME MOD: 67.6 CM3
LEFT INTERNAL DIMENSION IN SYSTOLE: 4.02 CM (ref 2.1–4)
LEFT VENTRICLE DIASTOLIC VOLUME: 128 ML
LEFT VENTRICLE SYSTOLIC VOLUME: 70.8 ML
LEFT VENTRICULAR INTERNAL DIMENSION IN DIASTOLE: 5.18 CM (ref 3.5–6)
LEFT VENTRICULAR MASS: 186.53 G
LEUKOCYTE ESTERASE UR QL STRIP.AUTO: NEGATIVE UNIT/L
LV LATERAL E/E' RATIO: 11.44 M/S
LV SEPTAL E/E' RATIO: 25.75 M/S
LVOT MG: 2 MMHG
LVOT MV: 0.71 CM/S
LYMPHOCYTES # BLD AUTO: 1.75 X10(3)/MCL (ref 0.6–4.6)
LYMPHOCYTES # BLD AUTO: 1.89 X10(3)/MCL (ref 0.6–4.6)
LYMPHOCYTES # BLD AUTO: 2.45 X10(3)/MCL (ref 0.6–4.6)
LYMPHOCYTES # BLD AUTO: 2.68 X10(3)/MCL (ref 0.6–4.6)
LYMPHOCYTES # BLD AUTO: 3.34 X10(3)/MCL (ref 0.6–4.6)
LYMPHOCYTES NFR BLD AUTO: 15.2 %
LYMPHOCYTES NFR BLD AUTO: 19 %
LYMPHOCYTES NFR BLD AUTO: 24.5 %
LYMPHOCYTES NFR BLD AUTO: 27.5 %
LYMPHOCYTES NFR BLD AUTO: 33.6 %
MAGNESIUM SERPL-MCNC: 1.9 MG/DL (ref 1.6–2.6)
MAGNESIUM SERPL-MCNC: 2.2 MG/DL (ref 1.6–2.6)
MAGNESIUM SERPL-MCNC: 2.6 MG/DL (ref 1.6–2.6)
MCH RBC QN AUTO: 29.9 PG (ref 27–31)
MCH RBC QN AUTO: 30.1 PG (ref 27–31)
MCH RBC QN AUTO: 30.2 PG (ref 27–31)
MCH RBC QN AUTO: 31.5 PG (ref 27–31)
MCH RBC QN AUTO: 31.8 PG (ref 27–31)
MCHC RBC AUTO-ENTMCNC: 30.6 G/DL (ref 33–36)
MCHC RBC AUTO-ENTMCNC: 30.7 G/DL (ref 33–36)
MCHC RBC AUTO-ENTMCNC: 30.7 G/DL (ref 33–36)
MCHC RBC AUTO-ENTMCNC: 32.1 G/DL (ref 33–36)
MCHC RBC AUTO-ENTMCNC: 32.3 G/DL (ref 33–36)
MCV RBC AUTO: 97.6 FL (ref 80–94)
MCV RBC AUTO: 98 FL (ref 80–94)
MCV RBC AUTO: 98.1 FL (ref 80–94)
MCV RBC AUTO: 98.3 FL (ref 80–94)
MCV RBC AUTO: 98.3 FL (ref 80–94)
MONOCYTES # BLD AUTO: 0.57 X10(3)/MCL (ref 0.1–1.3)
MONOCYTES # BLD AUTO: 0.62 X10(3)/MCL (ref 0.1–1.3)
MONOCYTES # BLD AUTO: 0.84 X10(3)/MCL (ref 0.1–1.3)
MONOCYTES # BLD AUTO: 0.84 X10(3)/MCL (ref 0.1–1.3)
MONOCYTES # BLD AUTO: 0.99 X10(3)/MCL (ref 0.1–1.3)
MONOCYTES NFR BLD AUTO: 5.4 %
MONOCYTES NFR BLD AUTO: 5.7 %
MONOCYTES NFR BLD AUTO: 8.4 %
MONOCYTES NFR BLD AUTO: 8.6 %
MONOCYTES NFR BLD AUTO: 9.9 %
MV PEAK A VEL: 1.27 M/S
MV PEAK E VEL: 1.03 M/S
NEUTROPHILS # BLD AUTO: 5.31 X10(3)/MCL (ref 2.1–9.2)
NEUTROPHILS # BLD AUTO: 5.5 X10(3)/MCL (ref 2.1–9.2)
NEUTROPHILS # BLD AUTO: 6.25 X10(3)/MCL (ref 2.1–9.2)
NEUTROPHILS # BLD AUTO: 6.97 X10(3)/MCL (ref 2.1–9.2)
NEUTROPHILS # BLD AUTO: 8.74 X10(3)/MCL (ref 2.1–9.2)
NEUTROPHILS NFR BLD AUTO: 53.4 %
NEUTROPHILS NFR BLD AUTO: 56.6 %
NEUTROPHILS NFR BLD AUTO: 62.4 %
NEUTROPHILS NFR BLD AUTO: 70.1 %
NEUTROPHILS NFR BLD AUTO: 76 %
NITRITE UR QL STRIP.AUTO: NEGATIVE
NRBC BLD AUTO-RTO: 0 %
OHS LV EJECTION FRACTION SIMPSONS BIPLANE MOD: 4 %
PH UR STRIP.AUTO: 5.5 [PH]
PHOSPHATE SERPL-MCNC: 3 MG/DL (ref 2.3–4.7)
PHOSPHATE SERPL-MCNC: 4.8 MG/DL (ref 2.3–4.7)
PISA TR MAX VEL: 2.71 M/S
PLATELET # BLD AUTO: 204 X10(3)/MCL (ref 130–400)
PLATELET # BLD AUTO: 233 X10(3)/MCL (ref 130–400)
PLATELET # BLD AUTO: 268 X10(3)/MCL (ref 130–400)
PLATELET # BLD AUTO: 273 X10(3)/MCL (ref 130–400)
PLATELET # BLD AUTO: 314 X10(3)/MCL (ref 130–400)
PMV BLD AUTO: 10 FL (ref 7.4–10.4)
PMV BLD AUTO: 10.1 FL (ref 7.4–10.4)
PMV BLD AUTO: 9.5 FL (ref 7.4–10.4)
PMV BLD AUTO: 9.5 FL (ref 7.4–10.4)
PMV BLD AUTO: 9.9 FL (ref 7.4–10.4)
POC IONIZED CALCIUM: 1.1 MMOL/L (ref 1.06–1.42)
POC IONIZED CALCIUM: 1.13 MMOL/L (ref 1.06–1.42)
POC TCO2 (MEASURED): 26 MMOL/L (ref 23–29)
POC TCO2 (MEASURED): 39 MMOL/L (ref 23–29)
POTASSIUM BLD-SCNC: 3.4 MMOL/L (ref 3.5–5.1)
POTASSIUM BLD-SCNC: 4.3 MMOL/L (ref 3.5–5.1)
POTASSIUM SERPL-SCNC: 3.8 MMOL/L (ref 3.5–5.1)
POTASSIUM SERPL-SCNC: 4.1 MMOL/L (ref 3.5–5.1)
POTASSIUM SERPL-SCNC: 4.4 MMOL/L (ref 3.5–5.1)
PROT SERPL-MCNC: 5.7 GM/DL (ref 5.8–7.6)
PROT SERPL-MCNC: 5.8 GM/DL (ref 5.8–7.6)
PROT SERPL-MCNC: 6.9 GM/DL (ref 5.8–7.6)
PROT SERPL-MCNC: 7 GM/DL (ref 5.8–7.6)
PROT UR QL STRIP.AUTO: ABNORMAL MG/DL
PROTHROMBIN TIME: 13.3 SECONDS (ref 12.5–14.5)
PROTHROMBIN TIME: 13.5 SECONDS (ref 12.5–14.5)
PSYCHE PATHOLOGY RESULT: NORMAL
PV PEAK VELOCITY: 1.15 CM/S
RBC # BLD AUTO: 2.91 X10(6)/MCL (ref 4.2–5.4)
RBC # BLD AUTO: 3.02 X10(6)/MCL (ref 4.2–5.4)
RBC # BLD AUTO: 3.43 X10(6)/MCL (ref 4.2–5.4)
RBC # BLD AUTO: 3.48 X10(6)/MCL (ref 4.2–5.4)
RBC # BLD AUTO: 3.78 X10(6)/MCL (ref 4.2–5.4)
RBC #/AREA URNS AUTO: <5 /HPF
RBC UR QL AUTO: NEGATIVE UNIT/L
RIGHT VENTRICULAR END-DIASTOLIC DIMENSION: 3.24 CM
SAMPLE: ABNORMAL
SAMPLE: ABNORMAL
SODIUM BLD-SCNC: 140 MMOL/L (ref 136–145)
SODIUM BLD-SCNC: 143 MMOL/L (ref 136–145)
SODIUM SERPL-SCNC: 139 MMOL/L (ref 136–145)
SODIUM SERPL-SCNC: 140 MMOL/L (ref 136–145)
SODIUM SERPL-SCNC: 140 MMOL/L (ref 136–145)
SODIUM SERPL-SCNC: 141 MMOL/L (ref 136–145)
SODIUM SERPL-SCNC: 142 MMOL/L (ref 136–145)
SODIUM SERPL-SCNC: 143 MMOL/L (ref 136–145)
SP GR UR STRIP.AUTO: 1.01 (ref 1–1.03)
SPECIMEN OUTDATE: NORMAL
SQUAMOUS #/AREA URNS AUTO: 8 /HPF
TDI LATERAL: 0.09 M/S
TDI SEPTAL: 0.04 M/S
TDI: 0.07 M/S
TR MAX PG: 29 MMHG
TRICUSPID ANNULAR PLANE SYSTOLIC EXCURSION: 2.11 CM
TROPONIN I SERPL-MCNC: 0.04 NG/ML (ref 0–0.04)
TROPONIN I SERPL-MCNC: 0.05 NG/ML (ref 0–0.04)
TROPONIN I SERPL-MCNC: 0.11 NG/ML (ref 0–0.04)
UROBILINOGEN UR STRIP-ACNC: 0.2 MG/DL
VIT B12 SERPL-MCNC: 1175 NG/L (ref 180–914)
WBC # SPEC AUTO: 10.01 X10(3)/MCL (ref 4.5–11.5)
WBC # SPEC AUTO: 11.5 X10(3)/MCL (ref 4.5–11.5)
WBC # SPEC AUTO: 9.73 X10(3)/MCL (ref 4.5–11.5)
WBC # SPEC AUTO: 9.95 X10(3)/MCL (ref 4.5–11.5)
WBC # SPEC AUTO: 9.95 X10(3)/MCL (ref 4.5–11.5)
WBC #/AREA URNS AUTO: <5 /HPF

## 2023-01-01 PROCEDURE — 63600175 PHARM REV CODE 636 W HCPCS: Performed by: NURSE ANESTHETIST, CERTIFIED REGISTERED

## 2023-01-01 PROCEDURE — 37000008 HC ANESTHESIA 1ST 15 MINUTES: Performed by: SURGERY

## 2023-01-01 PROCEDURE — 83735 ASSAY OF MAGNESIUM: CPT | Performed by: NURSE PRACTITIONER

## 2023-01-01 PROCEDURE — 87340 HEPATITIS B SURFACE AG IA: CPT | Performed by: INTERNAL MEDICINE

## 2023-01-01 PROCEDURE — 43239 EGD BIOPSY SINGLE/MULTIPLE: CPT | Performed by: INTERNAL MEDICINE

## 2023-01-01 PROCEDURE — 1160F PR REVIEW ALL MEDS BY PRESCRIBER/CLIN PHARMACIST DOCUMENTED: ICD-10-PCS | Mod: CPTII,,,

## 2023-01-01 PROCEDURE — D9220A PRA ANESTHESIA: Mod: ANES,,, | Performed by: ANESTHESIOLOGY

## 2023-01-01 PROCEDURE — 37000008 HC ANESTHESIA 1ST 15 MINUTES: Performed by: INTERNAL MEDICINE

## 2023-01-01 PROCEDURE — 85025 COMPLETE CBC W/AUTO DIFF WBC: CPT | Performed by: NURSE PRACTITIONER

## 2023-01-01 PROCEDURE — 25000003 PHARM REV CODE 250: Performed by: STUDENT IN AN ORGANIZED HEALTH CARE EDUCATION/TRAINING PROGRAM

## 2023-01-01 PROCEDURE — 27201423 OPTIME MED/SURG SUP & DEVICES STERILE SUPPLY: Performed by: SURGERY

## 2023-01-01 PROCEDURE — 11000001 HC ACUTE MED/SURG PRIVATE ROOM

## 2023-01-01 PROCEDURE — D9220A PRA ANESTHESIA: ICD-10-PCS | Mod: CRNA,,, | Performed by: NURSE ANESTHETIST, CERTIFIED REGISTERED

## 2023-01-01 PROCEDURE — 85025 COMPLETE CBC W/AUTO DIFF WBC: CPT | Performed by: PHYSICIAN ASSISTANT

## 2023-01-01 PROCEDURE — G0378 HOSPITAL OBSERVATION PER HR: HCPCS

## 2023-01-01 PROCEDURE — 25000003 PHARM REV CODE 250: Performed by: INTERNAL MEDICINE

## 2023-01-01 PROCEDURE — 37000009 HC ANESTHESIA EA ADD 15 MINS: Performed by: INTERNAL MEDICINE

## 2023-01-01 PROCEDURE — 93005 ELECTROCARDIOGRAM TRACING: CPT

## 2023-01-01 PROCEDURE — 45382 COLONOSCOPY W/CONTROL BLEED: CPT | Mod: 59 | Performed by: INTERNAL MEDICINE

## 2023-01-01 PROCEDURE — 81001 URINALYSIS AUTO W/SCOPE: CPT | Performed by: NURSE PRACTITIONER

## 2023-01-01 PROCEDURE — 27000221 HC OXYGEN, UP TO 24 HOURS

## 2023-01-01 PROCEDURE — 25000003 PHARM REV CODE 250: Performed by: NURSE ANESTHETIST, CERTIFIED REGISTERED

## 2023-01-01 PROCEDURE — 99900035 HC TECH TIME PER 15 MIN (STAT)

## 2023-01-01 PROCEDURE — 25000003 PHARM REV CODE 250: Performed by: SURGERY

## 2023-01-01 PROCEDURE — D9220A PRA ANESTHESIA: Mod: CRNA,,, | Performed by: NURSE ANESTHETIST, CERTIFIED REGISTERED

## 2023-01-01 PROCEDURE — 82607 VITAMIN B-12: CPT | Mod: 90 | Performed by: STUDENT IN AN ORGANIZED HEALTH CARE EDUCATION/TRAINING PROGRAM

## 2023-01-01 PROCEDURE — 25000003 PHARM REV CODE 250: Performed by: EMERGENCY MEDICINE

## 2023-01-01 PROCEDURE — 80053 COMPREHEN METABOLIC PANEL: CPT | Performed by: PHYSICIAN ASSISTANT

## 2023-01-01 PROCEDURE — 1126F AMNT PAIN NOTED NONE PRSNT: CPT | Mod: CPTII,,,

## 2023-01-01 PROCEDURE — 71000033 HC RECOVERY, INTIAL HOUR: Performed by: SURGERY

## 2023-01-01 PROCEDURE — 85730 THROMBOPLASTIN TIME PARTIAL: CPT | Performed by: PHYSICIAN ASSISTANT

## 2023-01-01 PROCEDURE — 96372 THER/PROPH/DIAG INJ SC/IM: CPT | Performed by: EMERGENCY MEDICINE

## 2023-01-01 PROCEDURE — 71046 X-RAY EXAM CHEST 2 VIEWS: CPT | Mod: TC

## 2023-01-01 PROCEDURE — 49320 PR LAP,DIAGNOSTIC ABDOMEN: ICD-10-PCS | Mod: AS,,,

## 2023-01-01 PROCEDURE — 1160F RVW MEDS BY RX/DR IN RCRD: CPT | Mod: CPTII,,,

## 2023-01-01 PROCEDURE — 49329 PR LAPROSCOPIC REMOVAL OF FOREIGN BODY, ABD: ICD-10-PCS | Mod: ,,, | Performed by: SURGERY

## 2023-01-01 PROCEDURE — 80069 RENAL FUNCTION PANEL: CPT | Performed by: INTERNAL MEDICINE

## 2023-01-01 PROCEDURE — 85651 RBC SED RATE NONAUTOMATED: CPT | Performed by: STUDENT IN AN ORGANIZED HEALTH CARE EDUCATION/TRAINING PROGRAM

## 2023-01-01 PROCEDURE — 25000003 PHARM REV CODE 250: Performed by: ANESTHESIOLOGY

## 2023-01-01 PROCEDURE — D9220A PRA ANESTHESIA: ICD-10-PCS | Mod: ANES,,, | Performed by: ANESTHESIOLOGY

## 2023-01-01 PROCEDURE — 37000009 HC ANESTHESIA EA ADD 15 MINS: Performed by: SURGERY

## 2023-01-01 PROCEDURE — 3075F SYST BP GE 130 - 139MM HG: CPT | Mod: CPTII,,,

## 2023-01-01 PROCEDURE — 3075F PR MOST RECENT SYSTOLIC BLOOD PRESS GE 130-139MM HG: ICD-10-PCS | Mod: CPTII,,,

## 2023-01-01 PROCEDURE — 99024 POSTOP FOLLOW-UP VISIT: CPT | Mod: ,,,

## 2023-01-01 PROCEDURE — 85610 PROTHROMBIN TIME: CPT | Performed by: NURSE PRACTITIONER

## 2023-01-01 PROCEDURE — 84100 ASSAY OF PHOSPHORUS: CPT | Performed by: NURSE PRACTITIONER

## 2023-01-01 PROCEDURE — 82746 ASSAY OF FOLIC ACID SERUM: CPT | Performed by: STUDENT IN AN ORGANIZED HEALTH CARE EDUCATION/TRAINING PROGRAM

## 2023-01-01 PROCEDURE — 3078F DIAST BP <80 MM HG: CPT | Mod: CPTII,,,

## 2023-01-01 PROCEDURE — 63600175 PHARM REV CODE 636 W HCPCS: Performed by: NURSE PRACTITIONER

## 2023-01-01 PROCEDURE — 71000016 HC POSTOP RECOV ADDL HR: Performed by: SURGERY

## 2023-01-01 PROCEDURE — 63600175 PHARM REV CODE 636 W HCPCS: Performed by: ANESTHESIOLOGY

## 2023-01-01 PROCEDURE — 99024 PR POST-OP FOLLOW-UP VISIT: ICD-10-PCS | Mod: ,,,

## 2023-01-01 PROCEDURE — 63600175 PHARM REV CODE 636 W HCPCS: Performed by: PHYSICIAN ASSISTANT

## 2023-01-01 PROCEDURE — 85025 COMPLETE CBC W/AUTO DIFF WBC: CPT | Performed by: INTERNAL MEDICINE

## 2023-01-01 PROCEDURE — 96374 THER/PROPH/DIAG INJ IV PUSH: CPT

## 2023-01-01 PROCEDURE — 93010 EKG 12-LEAD: ICD-10-PCS | Mod: ,,, | Performed by: INTERNAL MEDICINE

## 2023-01-01 PROCEDURE — 25000003 PHARM REV CODE 250: Performed by: NURSE PRACTITIONER

## 2023-01-01 PROCEDURE — 25000003 PHARM REV CODE 250: Performed by: PHYSICIAN ASSISTANT

## 2023-01-01 PROCEDURE — 27201423 OPTIME MED/SURG SUP & DEVICES STERILE SUPPLY: Performed by: INTERNAL MEDICINE

## 2023-01-01 PROCEDURE — 88305 TISSUE EXAM BY PATHOLOGIST: CPT | Performed by: INTERNAL MEDICINE

## 2023-01-01 PROCEDURE — 63600175 PHARM REV CODE 636 W HCPCS: Performed by: EMERGENCY MEDICINE

## 2023-01-01 PROCEDURE — 80053 COMPREHEN METABOLIC PANEL: CPT | Performed by: NURSE PRACTITIONER

## 2023-01-01 PROCEDURE — 83735 ASSAY OF MAGNESIUM: CPT | Performed by: INTERNAL MEDICINE

## 2023-01-01 PROCEDURE — 63600175 PHARM REV CODE 636 W HCPCS: Performed by: SURGERY

## 2023-01-01 PROCEDURE — 3078F PR MOST RECENT DIASTOLIC BLOOD PRESSURE < 80 MM HG: ICD-10-PCS | Mod: CPTII,,,

## 2023-01-01 PROCEDURE — 1159F PR MEDICATION LIST DOCUMENTED IN MEDICAL RECORD: ICD-10-PCS | Mod: CPTII,,,

## 2023-01-01 PROCEDURE — 80048 BASIC METABOLIC PNL TOTAL CA: CPT | Performed by: SURGERY

## 2023-01-01 PROCEDURE — 93010 ELECTROCARDIOGRAM REPORT: CPT | Mod: ,,, | Performed by: INTERNAL MEDICINE

## 2023-01-01 PROCEDURE — 63600175 PHARM REV CODE 636 W HCPCS

## 2023-01-01 PROCEDURE — 84484 ASSAY OF TROPONIN QUANT: CPT | Mod: 91 | Performed by: PHYSICIAN ASSISTANT

## 2023-01-01 PROCEDURE — 84484 ASSAY OF TROPONIN QUANT: CPT | Performed by: NURSE PRACTITIONER

## 2023-01-01 PROCEDURE — 49320 DIAG LAPARO SEPARATE PROC: CPT | Mod: AS,,,

## 2023-01-01 PROCEDURE — 85018 HEMOGLOBIN: CPT | Performed by: NURSE PRACTITIONER

## 2023-01-01 PROCEDURE — 71000015 HC POSTOP RECOV 1ST HR: Performed by: SURGERY

## 2023-01-01 PROCEDURE — 36000709 HC OR TIME LEV III EA ADD 15 MIN: Performed by: SURGERY

## 2023-01-01 PROCEDURE — 36000708 HC OR TIME LEV III 1ST 15 MIN: Performed by: SURGERY

## 2023-01-01 PROCEDURE — 84484 ASSAY OF TROPONIN QUANT: CPT | Performed by: PHYSICIAN ASSISTANT

## 2023-01-01 PROCEDURE — 86900 BLOOD TYPING SEROLOGIC ABO: CPT | Performed by: PHYSICIAN ASSISTANT

## 2023-01-01 PROCEDURE — 1126F PR PAIN SEVERITY QUANTIFIED, NO PAIN PRESENT: ICD-10-PCS | Mod: CPTII,,,

## 2023-01-01 PROCEDURE — G0257 UNSCHED DIALYSIS ESRD PT HOS: HCPCS

## 2023-01-01 PROCEDURE — 45385 COLONOSCOPY W/LESION REMOVAL: CPT | Performed by: INTERNAL MEDICINE

## 2023-01-01 PROCEDURE — 1159F MED LIST DOCD IN RCRD: CPT | Mod: CPTII,,,

## 2023-01-01 PROCEDURE — 49329 UNLSTD LAPS PX ABD PERTM&OMN: CPT | Mod: ,,, | Performed by: SURGERY

## 2023-01-01 PROCEDURE — 99285 EMERGENCY DEPT VISIT HI MDM: CPT | Mod: 25

## 2023-01-01 PROCEDURE — 80100016 HC MAINTENANCE HEMODIALYSIS

## 2023-01-01 PROCEDURE — 85610 PROTHROMBIN TIME: CPT | Performed by: PHYSICIAN ASSISTANT

## 2023-01-01 PROCEDURE — 86706 HEP B SURFACE ANTIBODY: CPT | Mod: 90 | Performed by: INTERNAL MEDICINE

## 2023-01-01 PROCEDURE — 43235 EGD DIAGNOSTIC BRUSH WASH: CPT | Performed by: INTERNAL MEDICINE

## 2023-01-01 PROCEDURE — C9113 INJ PANTOPRAZOLE SODIUM, VIA: HCPCS | Performed by: PHYSICIAN ASSISTANT

## 2023-01-01 PROCEDURE — C9113 INJ PANTOPRAZOLE SODIUM, VIA: HCPCS | Performed by: NURSE PRACTITIONER

## 2023-01-01 PROCEDURE — 86140 C-REACTIVE PROTEIN: CPT | Performed by: STUDENT IN AN ORGANIZED HEALTH CARE EDUCATION/TRAINING PROGRAM

## 2023-01-01 RX ORDER — ATORVASTATIN CALCIUM 40 MG/1
40 TABLET, FILM COATED ORAL NIGHTLY
Status: DISCONTINUED | OUTPATIENT
Start: 2023-01-01 | End: 2023-01-01 | Stop reason: HOSPADM

## 2023-01-01 RX ORDER — VALSARTAN 160 MG/1
160 TABLET ORAL DAILY
COMMUNITY
End: 2024-01-01 | Stop reason: CLARIF

## 2023-01-01 RX ORDER — CEFAZOLIN SODIUM 2 G/50ML
2 SOLUTION INTRAVENOUS ONCE
Status: DISCONTINUED | OUTPATIENT
Start: 2023-01-01 | End: 2023-01-01

## 2023-01-01 RX ORDER — TRAMADOL HYDROCHLORIDE 50 MG/1
50 TABLET ORAL EVERY 4 HOURS PRN
Status: DISCONTINUED | OUTPATIENT
Start: 2023-01-01 | End: 2023-01-01 | Stop reason: HOSPADM

## 2023-01-01 RX ORDER — ONDANSETRON 2 MG/ML
4 INJECTION INTRAMUSCULAR; INTRAVENOUS ONCE AS NEEDED
Status: COMPLETED | OUTPATIENT
Start: 2023-01-01 | End: 2023-01-01

## 2023-01-01 RX ORDER — CLOPIDOGREL BISULFATE 75 MG/1
75 TABLET ORAL DAILY
COMMUNITY

## 2023-01-01 RX ORDER — SODIUM CHLORIDE 9 MG/ML
INJECTION, SOLUTION INTRAVENOUS CONTINUOUS
Status: DISCONTINUED | OUTPATIENT
Start: 2023-01-01 | End: 2023-01-01 | Stop reason: HOSPADM

## 2023-01-01 RX ORDER — FENTANYL CITRATE 50 UG/ML
INJECTION, SOLUTION INTRAMUSCULAR; INTRAVENOUS
Status: DISCONTINUED | OUTPATIENT
Start: 2023-01-01 | End: 2023-01-01

## 2023-01-01 RX ORDER — EPHEDRINE SULFATE 50 MG/ML
INJECTION, SOLUTION INTRAVENOUS
Status: DISCONTINUED | OUTPATIENT
Start: 2023-01-01 | End: 2023-01-01

## 2023-01-01 RX ORDER — SODIUM CHLORIDE, SODIUM GLUCONATE, SODIUM ACETATE, POTASSIUM CHLORIDE AND MAGNESIUM CHLORIDE 30; 37; 368; 526; 502 MG/100ML; MG/100ML; MG/100ML; MG/100ML; MG/100ML
INJECTION, SOLUTION INTRAVENOUS CONTINUOUS
Status: CANCELLED | OUTPATIENT
Start: 2023-01-01 | End: 2023-01-01

## 2023-01-01 RX ORDER — LIDOCAINE HYDROCHLORIDE 20 MG/ML
INJECTION, SOLUTION EPIDURAL; INFILTRATION; INTRACAUDAL; PERINEURAL
Status: DISCONTINUED
Start: 2023-01-01 | End: 2023-01-01 | Stop reason: HOSPADM

## 2023-01-01 RX ORDER — PANTOPRAZOLE SODIUM 40 MG/1
40 TABLET, DELAYED RELEASE ORAL DAILY
Status: DISCONTINUED | OUTPATIENT
Start: 2023-01-01 | End: 2023-01-01 | Stop reason: HOSPADM

## 2023-01-01 RX ORDER — AMLODIPINE BESYLATE 5 MG/1
10 TABLET ORAL DAILY
Status: DISCONTINUED | OUTPATIENT
Start: 2023-01-01 | End: 2023-01-01 | Stop reason: HOSPADM

## 2023-01-01 RX ORDER — ACETAMINOPHEN 10 MG/ML
1000 INJECTION, SOLUTION INTRAVENOUS ONCE
Status: COMPLETED | OUTPATIENT
Start: 2023-01-01 | End: 2023-01-01

## 2023-01-01 RX ORDER — IPRATROPIUM BROMIDE AND ALBUTEROL SULFATE 2.5; .5 MG/3ML; MG/3ML
3 SOLUTION RESPIRATORY (INHALATION) ONCE AS NEEDED
Status: DISCONTINUED | OUTPATIENT
Start: 2023-01-01 | End: 2023-01-01 | Stop reason: HOSPADM

## 2023-01-01 RX ORDER — ENOXAPARIN SODIUM 100 MG/ML
1 INJECTION SUBCUTANEOUS
Status: COMPLETED | OUTPATIENT
Start: 2023-01-01 | End: 2023-01-01

## 2023-01-01 RX ORDER — HYDROMORPHONE HYDROCHLORIDE 2 MG/ML
0.4 INJECTION, SOLUTION INTRAMUSCULAR; INTRAVENOUS; SUBCUTANEOUS EVERY 5 MIN PRN
Status: DISCONTINUED | OUTPATIENT
Start: 2023-01-01 | End: 2023-01-01 | Stop reason: HOSPADM

## 2023-01-01 RX ORDER — SODIUM CHLORIDE 9 MG/ML
INJECTION, SOLUTION INTRAVENOUS ONCE
Status: CANCELLED | OUTPATIENT
Start: 2023-01-01 | End: 2023-01-01

## 2023-01-01 RX ORDER — METOPROLOL SUCCINATE 50 MG/1
50 TABLET, EXTENDED RELEASE ORAL DAILY
Status: DISCONTINUED | OUTPATIENT
Start: 2023-01-01 | End: 2023-01-01 | Stop reason: HOSPADM

## 2023-01-01 RX ORDER — CEFAZOLIN SODIUM 1 G/3ML
INJECTION, POWDER, FOR SOLUTION INTRAMUSCULAR; INTRAVENOUS
Status: DISCONTINUED
Start: 2023-01-01 | End: 2023-01-01 | Stop reason: WASHOUT

## 2023-01-01 RX ORDER — ASCORBIC ACID, TOCOPHERYL ACID SUCCINATE, THIAMINE, RIBOFLAVIN, NIACINAMIDE, PYRIDOXINE, FOLIC ACID, COBALAMIN, BIOTIN, PANTOTHENIC ACID, ZINC, SELENIUM 100; 1.5; 1.7; 20; 25; 3; 1; 300; 10; 15; 30; 7 MG/1; MG/1; MG/1; MG/1; MG/1; MG/1; MG/1; UG/1; MG/1; MG/1; [IU]/1; UG/1
TABLET, COATED ORAL DAILY
COMMUNITY

## 2023-01-01 RX ORDER — SODIUM CHLORIDE, SODIUM LACTATE, POTASSIUM CHLORIDE, CALCIUM CHLORIDE 600; 310; 30; 20 MG/100ML; MG/100ML; MG/100ML; MG/100ML
INJECTION, SOLUTION INTRAVENOUS CONTINUOUS
Status: CANCELLED | OUTPATIENT
Start: 2023-01-01

## 2023-01-01 RX ORDER — LIDOCAINE HYDROCHLORIDE 10 MG/ML
1 INJECTION, SOLUTION EPIDURAL; INFILTRATION; INTRACAUDAL; PERINEURAL ONCE
Status: DISCONTINUED | OUTPATIENT
Start: 2023-01-01 | End: 2023-01-01 | Stop reason: HOSPADM

## 2023-01-01 RX ORDER — MAG HYDROX/ALUMINUM HYD/SIMETH 200-200-20
30 SUSPENSION, ORAL (FINAL DOSE FORM) ORAL EVERY 4 HOURS PRN
Status: DISCONTINUED | OUTPATIENT
Start: 2023-01-01 | End: 2023-01-01 | Stop reason: HOSPADM

## 2023-01-01 RX ORDER — CLOPIDOGREL BISULFATE 75 MG/1
75 TABLET ORAL DAILY
Status: DISCONTINUED | OUTPATIENT
Start: 2023-01-01 | End: 2023-01-01 | Stop reason: HOSPADM

## 2023-01-01 RX ORDER — LIDOCAINE HYDROCHLORIDE 10 MG/ML
1 INJECTION, SOLUTION EPIDURAL; INFILTRATION; INTRACAUDAL; PERINEURAL ONCE
Status: CANCELLED | OUTPATIENT
Start: 2023-01-01 | End: 2023-01-01

## 2023-01-01 RX ORDER — PROPOFOL 10 MG/ML
VIAL (ML) INTRAVENOUS
Status: DISCONTINUED
Start: 2023-01-01 | End: 2023-01-01 | Stop reason: HOSPADM

## 2023-01-01 RX ORDER — DIPHENHYDRAMINE HYDROCHLORIDE 50 MG/ML
25 INJECTION INTRAMUSCULAR; INTRAVENOUS ONCE
Status: CANCELLED | OUTPATIENT
Start: 2023-01-01 | End: 2023-01-01

## 2023-01-01 RX ORDER — ONDANSETRON 2 MG/ML
4 INJECTION INTRAMUSCULAR; INTRAVENOUS EVERY 4 HOURS PRN
Status: DISCONTINUED | OUTPATIENT
Start: 2023-01-01 | End: 2023-01-01 | Stop reason: HOSPADM

## 2023-01-01 RX ORDER — ALLOPURINOL 100 MG/1
100 TABLET ORAL DAILY
Status: DISCONTINUED | OUTPATIENT
Start: 2023-01-01 | End: 2023-01-01 | Stop reason: HOSPADM

## 2023-01-01 RX ORDER — MIDAZOLAM HYDROCHLORIDE 1 MG/ML
2 INJECTION INTRAMUSCULAR; INTRAVENOUS ONCE AS NEEDED
Status: DISCONTINUED | OUTPATIENT
Start: 2023-01-01 | End: 2023-01-01 | Stop reason: HOSPADM

## 2023-01-01 RX ORDER — TALC
6 POWDER (GRAM) TOPICAL NIGHTLY PRN
Status: DISCONTINUED | OUTPATIENT
Start: 2023-01-01 | End: 2023-01-01 | Stop reason: HOSPADM

## 2023-01-01 RX ORDER — HYDROCODONE BITARTRATE AND ACETAMINOPHEN 7.5; 325 MG/1; MG/1
1 TABLET ORAL EVERY 6 HOURS PRN
Status: DISCONTINUED | OUTPATIENT
Start: 2023-01-01 | End: 2023-01-01 | Stop reason: HOSPADM

## 2023-01-01 RX ORDER — AMLODIPINE BESYLATE 5 MG/1
10 TABLET ORAL NIGHTLY
Status: DISCONTINUED | OUTPATIENT
Start: 2023-01-01 | End: 2023-01-01 | Stop reason: HOSPADM

## 2023-01-01 RX ORDER — BUPIVACAINE HYDROCHLORIDE AND EPINEPHRINE 5; 5 MG/ML; UG/ML
INJECTION, SOLUTION EPIDURAL; INTRACAUDAL; PERINEURAL
Status: DISCONTINUED
Start: 2023-01-01 | End: 2023-01-01 | Stop reason: WASHOUT

## 2023-01-01 RX ORDER — SODIUM BICARBONATE 650 MG/1
650 TABLET ORAL 3 TIMES DAILY
Status: DISCONTINUED | OUTPATIENT
Start: 2023-01-01 | End: 2023-01-01 | Stop reason: HOSPADM

## 2023-01-01 RX ORDER — MIDAZOLAM HYDROCHLORIDE 1 MG/ML
2 INJECTION INTRAMUSCULAR; INTRAVENOUS ONCE AS NEEDED
Status: CANCELLED | OUTPATIENT
Start: 2023-01-01 | End: 2035-01-21

## 2023-01-01 RX ORDER — DEXAMETHASONE SODIUM PHOSPHATE 4 MG/ML
INJECTION, SOLUTION INTRA-ARTICULAR; INTRALESIONAL; INTRAMUSCULAR; INTRAVENOUS; SOFT TISSUE
Status: DISCONTINUED | OUTPATIENT
Start: 2023-01-01 | End: 2023-01-01

## 2023-01-01 RX ORDER — HYDRALAZINE HYDROCHLORIDE 50 MG/1
50 TABLET, FILM COATED ORAL NIGHTLY
Status: DISCONTINUED | OUTPATIENT
Start: 2023-01-01 | End: 2023-01-01 | Stop reason: HOSPADM

## 2023-01-01 RX ORDER — FLUTICASONE PROPIONATE AND SALMETEROL 100; 50 UG/1; UG/1
1 POWDER RESPIRATORY (INHALATION) EVERY 12 HOURS
COMMUNITY
End: 2024-01-01 | Stop reason: CLARIF

## 2023-01-01 RX ORDER — HYDROMORPHONE HYDROCHLORIDE 2 MG/ML
0.5 INJECTION, SOLUTION INTRAMUSCULAR; INTRAVENOUS; SUBCUTANEOUS EVERY 5 MIN PRN
Status: DISCONTINUED | OUTPATIENT
Start: 2023-01-01 | End: 2023-01-01 | Stop reason: HOSPADM

## 2023-01-01 RX ORDER — GLUCAGON 1 MG
1 KIT INJECTION
Status: DISCONTINUED | OUTPATIENT
Start: 2023-01-01 | End: 2023-01-01 | Stop reason: HOSPADM

## 2023-01-01 RX ORDER — IPRATROPIUM BROMIDE AND ALBUTEROL SULFATE 2.5; .5 MG/3ML; MG/3ML
3 SOLUTION RESPIRATORY (INHALATION) ONCE AS NEEDED
Status: CANCELLED | OUTPATIENT
Start: 2023-01-01 | End: 2034-10-25

## 2023-01-01 RX ORDER — ONDANSETRON HYDROCHLORIDE 2 MG/ML
INJECTION, SOLUTION INTRAMUSCULAR; INTRAVENOUS
Status: DISCONTINUED | OUTPATIENT
Start: 2023-01-01 | End: 2023-01-01

## 2023-01-01 RX ORDER — TRAMADOL HYDROCHLORIDE 50 MG/1
50 TABLET ORAL EVERY 6 HOURS PRN
Qty: 20 TABLET | Refills: 0 | Status: ON HOLD | OUTPATIENT
Start: 2023-01-01 | End: 2023-01-01

## 2023-01-01 RX ORDER — SODIUM CHLORIDE 9 MG/ML
INJECTION, SOLUTION INTRAVENOUS
Status: CANCELLED | OUTPATIENT
Start: 2023-01-01

## 2023-01-01 RX ORDER — ATORVASTATIN CALCIUM 40 MG/1
40 TABLET, FILM COATED ORAL DAILY
Status: DISCONTINUED | OUTPATIENT
Start: 2023-01-01 | End: 2023-01-01 | Stop reason: HOSPADM

## 2023-01-01 RX ORDER — ACETAMINOPHEN 325 MG/1
650 TABLET ORAL EVERY 4 HOURS PRN
Status: DISCONTINUED | OUTPATIENT
Start: 2023-01-01 | End: 2023-01-01 | Stop reason: HOSPADM

## 2023-01-01 RX ORDER — MIDAZOLAM HYDROCHLORIDE 1 MG/ML
2 INJECTION INTRAMUSCULAR; INTRAVENOUS ONCE AS NEEDED
Status: COMPLETED | OUTPATIENT
Start: 2023-01-01 | End: 2023-01-01

## 2023-01-01 RX ORDER — MEPERIDINE HYDROCHLORIDE 25 MG/ML
12.5 INJECTION INTRAMUSCULAR; INTRAVENOUS; SUBCUTANEOUS EVERY 10 MIN PRN
Status: DISCONTINUED | OUTPATIENT
Start: 2023-01-01 | End: 2023-01-01 | Stop reason: HOSPADM

## 2023-01-01 RX ORDER — PROPOFOL 10 MG/ML
INJECTION, EMULSION INTRAVENOUS
Status: DISCONTINUED | OUTPATIENT
Start: 2023-01-01 | End: 2023-01-01

## 2023-01-01 RX ORDER — MUPIROCIN 20 MG/G
OINTMENT TOPICAL 2 TIMES DAILY
Status: DISCONTINUED | OUTPATIENT
Start: 2023-01-01 | End: 2023-01-01 | Stop reason: HOSPADM

## 2023-01-01 RX ORDER — MEPERIDINE HYDROCHLORIDE 25 MG/ML
12.5 INJECTION INTRAMUSCULAR; INTRAVENOUS; SUBCUTANEOUS ONCE
Status: CANCELLED | OUTPATIENT
Start: 2023-01-01 | End: 2023-01-01

## 2023-01-01 RX ORDER — CALCITRIOL 0.5 UG/1
0.5 CAPSULE ORAL
Status: DISCONTINUED | OUTPATIENT
Start: 2023-01-01 | End: 2023-01-01 | Stop reason: HOSPADM

## 2023-01-01 RX ORDER — MEPERIDINE HYDROCHLORIDE 25 MG/ML
50 INJECTION INTRAMUSCULAR; INTRAVENOUS; SUBCUTANEOUS
Status: DISCONTINUED | OUTPATIENT
Start: 2023-01-01 | End: 2023-01-01 | Stop reason: HOSPADM

## 2023-01-01 RX ORDER — SODIUM CHLORIDE 9 MG/ML
0-999 INJECTION, SOLUTION INTRAVENOUS CONTINUOUS
Status: DISCONTINUED | OUTPATIENT
Start: 2023-01-01 | End: 2023-01-01 | Stop reason: HOSPADM

## 2023-01-01 RX ORDER — ACETAMINOPHEN 500 MG
1000 TABLET ORAL ONCE
Status: COMPLETED | OUTPATIENT
Start: 2023-01-01 | End: 2023-01-01

## 2023-01-01 RX ORDER — GLYCOPYRROLATE 0.2 MG/ML
INJECTION INTRAMUSCULAR; INTRAVENOUS
Status: DISCONTINUED | OUTPATIENT
Start: 2023-01-01 | End: 2023-01-01

## 2023-01-01 RX ORDER — ACETAMINOPHEN 325 MG/1
650 TABLET ORAL EVERY 8 HOURS PRN
Status: DISCONTINUED | OUTPATIENT
Start: 2023-01-01 | End: 2023-01-01 | Stop reason: HOSPADM

## 2023-01-01 RX ORDER — POLYETHYLENE GLYCOL 3350 17 G/17G
17 POWDER, FOR SOLUTION ORAL 2 TIMES DAILY PRN
Status: DISCONTINUED | OUTPATIENT
Start: 2023-01-01 | End: 2023-01-01 | Stop reason: HOSPADM

## 2023-01-01 RX ORDER — DIPHENHYDRAMINE HYDROCHLORIDE 50 MG/ML
25 INJECTION INTRAMUSCULAR; INTRAVENOUS EVERY 6 HOURS PRN
Status: CANCELLED | OUTPATIENT
Start: 2023-01-01

## 2023-01-01 RX ORDER — CLINDAMYCIN PHOSPHATE 900 MG/50ML
900 INJECTION, SOLUTION INTRAVENOUS ONCE
Status: COMPLETED | OUTPATIENT
Start: 2023-01-01 | End: 2023-01-01

## 2023-01-01 RX ORDER — LIDOCAINE HYDROCHLORIDE 10 MG/ML
INJECTION, SOLUTION EPIDURAL; INFILTRATION; INTRACAUDAL; PERINEURAL
Status: DISCONTINUED | OUTPATIENT
Start: 2023-01-01 | End: 2023-01-01

## 2023-01-01 RX ORDER — LANOLIN ALCOHOL/MO/W.PET/CERES
400 CREAM (GRAM) TOPICAL 2 TIMES DAILY
COMMUNITY

## 2023-01-01 RX ORDER — HYDRALAZINE HYDROCHLORIDE 50 MG/1
50 TABLET, FILM COATED ORAL 3 TIMES DAILY
Status: DISCONTINUED | OUTPATIENT
Start: 2023-01-01 | End: 2023-01-01 | Stop reason: HOSPADM

## 2023-01-01 RX ORDER — ONDANSETRON 2 MG/ML
4 INJECTION INTRAMUSCULAR; INTRAVENOUS ONCE
Status: CANCELLED | OUTPATIENT
Start: 2023-01-01 | End: 2023-01-01

## 2023-01-01 RX ORDER — LIDOCAINE HYDROCHLORIDE 20 MG/ML
INJECTION INTRAVENOUS
Status: DISCONTINUED | OUTPATIENT
Start: 2023-01-01 | End: 2023-01-01

## 2023-01-01 RX ORDER — HYDROMORPHONE HYDROCHLORIDE 2 MG/ML
0.4 INJECTION, SOLUTION INTRAMUSCULAR; INTRAVENOUS; SUBCUTANEOUS EVERY 5 MIN PRN
Status: DISCONTINUED | OUTPATIENT
Start: 2023-01-01 | End: 2023-01-01

## 2023-01-01 RX ORDER — IBUPROFEN 200 MG
16 TABLET ORAL
Status: DISCONTINUED | OUTPATIENT
Start: 2023-01-01 | End: 2023-01-01 | Stop reason: HOSPADM

## 2023-01-01 RX ORDER — FEBUXOSTAT 40 MG/1
40 TABLET, FILM COATED ORAL DAILY
COMMUNITY
End: 2024-01-01 | Stop reason: CLARIF

## 2023-01-01 RX ORDER — ROCURONIUM BROMIDE 10 MG/ML
INJECTION, SOLUTION INTRAVENOUS
Status: DISCONTINUED | OUTPATIENT
Start: 2023-01-01 | End: 2023-01-01

## 2023-01-01 RX ORDER — ONDANSETRON 2 MG/ML
INJECTION INTRAMUSCULAR; INTRAVENOUS
Status: DISCONTINUED | OUTPATIENT
Start: 2023-01-01 | End: 2023-01-01

## 2023-01-01 RX ORDER — PANTOPRAZOLE SODIUM 40 MG/10ML
40 INJECTION, POWDER, LYOPHILIZED, FOR SOLUTION INTRAVENOUS
Status: COMPLETED | OUTPATIENT
Start: 2023-01-01 | End: 2023-01-01

## 2023-01-01 RX ORDER — IBUPROFEN 200 MG
24 TABLET ORAL
Status: DISCONTINUED | OUTPATIENT
Start: 2023-01-01 | End: 2023-01-01 | Stop reason: HOSPADM

## 2023-01-01 RX ORDER — ONDANSETRON 2 MG/ML
4 INJECTION INTRAMUSCULAR; INTRAVENOUS DAILY PRN
Status: CANCELLED | OUTPATIENT
Start: 2023-01-01

## 2023-01-01 RX ORDER — AMOXICILLIN 250 MG
1 CAPSULE ORAL 2 TIMES DAILY PRN
Status: DISCONTINUED | OUTPATIENT
Start: 2023-01-01 | End: 2023-01-01 | Stop reason: HOSPADM

## 2023-01-01 RX ORDER — TRAZODONE HYDROCHLORIDE 50 MG/1
50 TABLET ORAL NIGHTLY
Status: DISCONTINUED | OUTPATIENT
Start: 2023-01-01 | End: 2023-01-01 | Stop reason: HOSPADM

## 2023-01-01 RX ORDER — CLOPIDOGREL BISULFATE 75 MG/1
75 TABLET ORAL ONCE
Status: COMPLETED | OUTPATIENT
Start: 2023-01-01 | End: 2023-01-01

## 2023-01-01 RX ORDER — SEVOFLURANE 250 ML/250ML
LIQUID RESPIRATORY (INHALATION)
Status: DISCONTINUED
Start: 2023-01-01 | End: 2023-01-01 | Stop reason: HOSPADM

## 2023-01-01 RX ORDER — PANTOPRAZOLE SODIUM 40 MG/10ML
40 INJECTION, POWDER, LYOPHILIZED, FOR SOLUTION INTRAVENOUS ONCE
Status: DISCONTINUED | OUTPATIENT
Start: 2023-01-01 | End: 2023-01-01 | Stop reason: HOSPADM

## 2023-01-01 RX ORDER — FUROSEMIDE 20 MG/1
20 TABLET ORAL DAILY
Status: DISCONTINUED | OUTPATIENT
Start: 2023-01-01 | End: 2023-01-01 | Stop reason: HOSPADM

## 2023-01-01 RX ORDER — LEVOCETIRIZINE DIHYDROCHLORIDE 5 MG/1
5 TABLET, FILM COATED ORAL NIGHTLY
COMMUNITY

## 2023-01-01 RX ORDER — BROMPHENIRAMINE MALEATE, DEXTROMETHORPHAN HBR, PHENYLEPHRINE HCL, DIPHENHYDRAMINE HCL, PHENYLEPHRINE HCL 0.52G
0.52 KIT ORAL DAILY
COMMUNITY

## 2023-01-01 RX ORDER — SODIUM CHLORIDE 0.9 % (FLUSH) 0.9 %
10 SYRINGE (ML) INJECTION
Status: DISCONTINUED | OUTPATIENT
Start: 2023-01-01 | End: 2023-01-01 | Stop reason: HOSPADM

## 2023-01-01 RX ORDER — DIPHENHYDRAMINE HYDROCHLORIDE 50 MG/ML
25 INJECTION INTRAMUSCULAR; INTRAVENOUS ONCE AS NEEDED
Status: DISCONTINUED | OUTPATIENT
Start: 2023-01-01 | End: 2023-01-01 | Stop reason: HOSPADM

## 2023-01-01 RX ORDER — PROPOFOL 10 MG/ML
VIAL (ML) INTRAVENOUS
Status: DISCONTINUED | OUTPATIENT
Start: 2023-01-01 | End: 2023-01-01

## 2023-01-01 RX ORDER — LEVOFLOXACIN 5 MG/ML
500 INJECTION, SOLUTION INTRAVENOUS
Status: CANCELLED | OUTPATIENT
Start: 2023-01-01 | End: 2023-01-01

## 2023-01-01 RX ORDER — SEVELAMER CARBONATE 800 MG/1
800 TABLET, FILM COATED ORAL
Status: DISCONTINUED | OUTPATIENT
Start: 2023-01-01 | End: 2023-01-01 | Stop reason: HOSPADM

## 2023-01-01 RX ORDER — BUPIVACAINE HYDROCHLORIDE AND EPINEPHRINE 2.5; 5 MG/ML; UG/ML
INJECTION, SOLUTION EPIDURAL; INFILTRATION; INTRACAUDAL; PERINEURAL
Status: DISCONTINUED | OUTPATIENT
Start: 2023-01-01 | End: 2023-01-01 | Stop reason: HOSPADM

## 2023-01-01 RX ORDER — METOCLOPRAMIDE HYDROCHLORIDE 5 MG/ML
10 INJECTION INTRAMUSCULAR; INTRAVENOUS EVERY 10 MIN PRN
Status: CANCELLED | OUTPATIENT
Start: 2023-01-01

## 2023-01-01 RX ORDER — ALLOPURINOL 100 MG/1
100 TABLET ORAL NIGHTLY
Status: DISCONTINUED | OUTPATIENT
Start: 2023-01-01 | End: 2023-01-01 | Stop reason: HOSPADM

## 2023-01-01 RX ORDER — TRAMADOL HYDROCHLORIDE 50 MG/1
50 TABLET ORAL EVERY 6 HOURS PRN
Qty: 20 TABLET | Refills: 0 | Status: SHIPPED | OUTPATIENT
Start: 2023-01-01 | End: 2024-01-01 | Stop reason: CLARIF

## 2023-01-01 RX ORDER — PANTOPRAZOLE SODIUM 40 MG/10ML
40 INJECTION, POWDER, LYOPHILIZED, FOR SOLUTION INTRAVENOUS 2 TIMES DAILY
Status: DISCONTINUED | OUTPATIENT
Start: 2023-01-01 | End: 2023-01-01 | Stop reason: HOSPADM

## 2023-01-01 RX ORDER — SODIUM CHLORIDE 9 MG/ML
INJECTION, SOLUTION INTRAMUSCULAR; INTRAVENOUS; SUBCUTANEOUS
Status: DISCONTINUED
Start: 2023-01-01 | End: 2023-01-01 | Stop reason: WASHOUT

## 2023-01-01 RX ORDER — CLINDAMYCIN PHOSPHATE 600 MG/50ML
600 INJECTION, SOLUTION INTRAVENOUS
Status: DISCONTINUED | OUTPATIENT
Start: 2023-01-01 | End: 2023-01-01 | Stop reason: HOSPADM

## 2023-01-01 RX ORDER — ASPIRIN 325 MG
325 TABLET, DELAYED RELEASE (ENTERIC COATED) ORAL
Status: COMPLETED | OUTPATIENT
Start: 2023-01-01 | End: 2023-01-01

## 2023-01-01 RX ORDER — METOCLOPRAMIDE HYDROCHLORIDE 5 MG/ML
10 INJECTION INTRAMUSCULAR; INTRAVENOUS ONCE
Status: COMPLETED | OUTPATIENT
Start: 2023-01-01 | End: 2023-01-01

## 2023-01-01 RX ORDER — TRAZODONE HYDROCHLORIDE 50 MG/1
50 TABLET ORAL NIGHTLY
COMMUNITY

## 2023-01-01 RX ORDER — PHENYLEPHRINE HYDROCHLORIDE 10 MG/ML
INJECTION INTRAVENOUS
Status: DISCONTINUED | OUTPATIENT
Start: 2023-01-01 | End: 2023-01-01

## 2023-01-01 RX ORDER — SODIUM CHLORIDE 0.9 % (FLUSH) 0.9 %
10 SYRINGE (ML) INJECTION EVERY 12 HOURS PRN
Status: DISCONTINUED | OUTPATIENT
Start: 2023-01-01 | End: 2023-01-01 | Stop reason: HOSPADM

## 2023-01-01 RX ORDER — ONDANSETRON 4 MG/1
4 TABLET, ORALLY DISINTEGRATING ORAL ONCE
Status: CANCELLED | OUTPATIENT
Start: 2023-01-01 | End: 2023-01-01

## 2023-01-01 RX ORDER — BUPIVACAINE HYDROCHLORIDE 2.5 MG/ML
INJECTION, SOLUTION EPIDURAL; INFILTRATION; INTRACAUDAL
Status: DISCONTINUED | OUTPATIENT
Start: 2023-01-01 | End: 2023-01-01 | Stop reason: HOSPADM

## 2023-01-01 RX ORDER — ONDANSETRON 2 MG/ML
4 INJECTION INTRAMUSCULAR; INTRAVENOUS ONCE AS NEEDED
Status: DISCONTINUED | OUTPATIENT
Start: 2023-01-01 | End: 2023-01-01 | Stop reason: HOSPADM

## 2023-01-01 RX ORDER — PROCHLORPERAZINE EDISYLATE 5 MG/ML
5 INJECTION INTRAMUSCULAR; INTRAVENOUS EVERY 6 HOURS PRN
Status: DISCONTINUED | OUTPATIENT
Start: 2023-01-01 | End: 2023-01-01 | Stop reason: HOSPADM

## 2023-01-01 RX ORDER — FAMOTIDINE 10 MG/ML
20 INJECTION INTRAVENOUS ONCE
Status: COMPLETED | OUTPATIENT
Start: 2023-01-01 | End: 2023-01-01

## 2023-01-01 RX ORDER — BISACODYL 10 MG
10 SUPPOSITORY, RECTAL RECTAL DAILY PRN
Status: DISCONTINUED | OUTPATIENT
Start: 2023-01-01 | End: 2023-01-01 | Stop reason: HOSPADM

## 2023-01-01 RX ORDER — ACETAMINOPHEN 10 MG/ML
INJECTION, SOLUTION INTRAVENOUS
Status: DISCONTINUED | OUTPATIENT
Start: 2023-01-01 | End: 2023-01-01

## 2023-01-01 RX ORDER — CALCITRIOL 0.5 UG/1
0.5 CAPSULE ORAL
Status: ON HOLD | COMMUNITY
End: 2023-01-01 | Stop reason: CLARIF

## 2023-01-01 RX ORDER — BENZONATATE 100 MG/1
100 CAPSULE ORAL 3 TIMES DAILY PRN
Status: DISCONTINUED | OUTPATIENT
Start: 2023-01-01 | End: 2023-01-01 | Stop reason: HOSPADM

## 2023-01-01 RX ORDER — AMITRIPTYLINE HYDROCHLORIDE 25 MG/1
25 TABLET, FILM COATED ORAL NIGHTLY
Status: DISCONTINUED | OUTPATIENT
Start: 2023-01-01 | End: 2023-01-01 | Stop reason: HOSPADM

## 2023-01-01 RX ORDER — KETOROLAC TROMETHAMINE 30 MG/ML
15 INJECTION, SOLUTION INTRAMUSCULAR; INTRAVENOUS ONCE
Status: DISCONTINUED | OUTPATIENT
Start: 2023-01-01 | End: 2023-01-01 | Stop reason: HOSPADM

## 2023-01-01 RX ORDER — GABAPENTIN 300 MG/1
300 CAPSULE ORAL DAILY
COMMUNITY

## 2023-01-01 RX ORDER — GUAIFENESIN 600 MG/1
600 TABLET, EXTENDED RELEASE ORAL 2 TIMES DAILY
Status: DISCONTINUED | OUTPATIENT
Start: 2023-01-01 | End: 2023-01-01 | Stop reason: HOSPADM

## 2023-01-01 RX ORDER — BUPIVACAINE HYDROCHLORIDE AND EPINEPHRINE 2.5; 5 MG/ML; UG/ML
INJECTION, SOLUTION EPIDURAL; INFILTRATION; INTRACAUDAL; PERINEURAL
Status: DISCONTINUED
Start: 2023-01-01 | End: 2023-01-01 | Stop reason: HOSPADM

## 2023-01-01 RX ADMIN — SEVELAMER CARBONATE 800 MG: 800 TABLET, FILM COATED ORAL at 07:05

## 2023-01-01 RX ADMIN — SUGAMMADEX 200 MG: 100 INJECTION, SOLUTION INTRAVENOUS at 08:09

## 2023-01-01 RX ADMIN — EPHEDRINE SULFATE 25 MG: 50 INJECTION INTRAVENOUS at 07:05

## 2023-01-01 RX ADMIN — FAMOTIDINE 20 MG: 10 INJECTION, SOLUTION INTRAVENOUS at 06:05

## 2023-01-01 RX ADMIN — SODIUM BICARBONATE 650 MG TABLET 650 MG: at 02:05

## 2023-01-01 RX ADMIN — FUROSEMIDE 20 MG: 20 TABLET ORAL at 02:05

## 2023-01-01 RX ADMIN — ONDANSETRON 4 MG: 2 INJECTION INTRAMUSCULAR; INTRAVENOUS at 09:09

## 2023-01-01 RX ADMIN — SEVELAMER CARBONATE 800 MG: 800 TABLET, FILM COATED ORAL at 06:05

## 2023-01-01 RX ADMIN — GLYCOPYRROLATE 0.2 MG: 0.2 INJECTION INTRAMUSCULAR; INTRAVENOUS at 07:05

## 2023-01-01 RX ADMIN — DEXAMETHASONE SODIUM PHOSPHATE 4 MG: 4 INJECTION, SOLUTION INTRA-ARTICULAR; INTRALESIONAL; INTRAMUSCULAR; INTRAVENOUS; SOFT TISSUE at 07:05

## 2023-01-01 RX ADMIN — CLINDAMYCIN IN 5 PERCENT DEXTROSE 600 MG: 12 INJECTION, SOLUTION INTRAVENOUS at 07:09

## 2023-01-01 RX ADMIN — SODIUM CHLORIDE: 9 INJECTION, SOLUTION INTRAVENOUS at 05:05

## 2023-01-01 RX ADMIN — HYDROMORPHONE HYDROCHLORIDE 0.5 MG: 2 INJECTION INTRAMUSCULAR; INTRAVENOUS; SUBCUTANEOUS at 08:05

## 2023-01-01 RX ADMIN — CLINDAMYCIN PHOSPHATE 900 MG: 900 INJECTION, SOLUTION INTRAVENOUS at 07:05

## 2023-01-01 RX ADMIN — ACETAMINOPHEN 1000 MG: 10 INJECTION, SOLUTION INTRAVENOUS at 08:09

## 2023-01-01 RX ADMIN — HYDRALAZINE HYDROCHLORIDE 50 MG: 50 TABLET, FILM COATED ORAL at 02:05

## 2023-01-01 RX ADMIN — PROPOFOL 30 MG: 10 INJECTION, EMULSION INTRAVENOUS at 01:11

## 2023-01-01 RX ADMIN — ONDANSETRON 4 MG: 2 INJECTION INTRAMUSCULAR; INTRAVENOUS at 08:09

## 2023-01-01 RX ADMIN — EPHEDRINE SULFATE 15 MG: 50 INJECTION INTRAVENOUS at 08:05

## 2023-01-01 RX ADMIN — CLOPIDOGREL BISULFATE 75 MG: 75 TABLET ORAL at 02:05

## 2023-01-01 RX ADMIN — PANTOPRAZOLE SODIUM 40 MG: 40 INJECTION, POWDER, LYOPHILIZED, FOR SOLUTION INTRAVENOUS at 09:08

## 2023-01-01 RX ADMIN — PROPOFOL 75 MG: 10 INJECTION, EMULSION INTRAVENOUS at 07:05

## 2023-01-01 RX ADMIN — ROCURONIUM BROMIDE 50 MG: 10 SOLUTION INTRAVENOUS at 07:05

## 2023-01-01 RX ADMIN — LIDOCAINE HYDROCHLORIDE 100 MG: 20 INJECTION INTRAVENOUS at 12:11

## 2023-01-01 RX ADMIN — PROPOFOL 120 MG: 10 INJECTION, EMULSION INTRAVENOUS at 07:09

## 2023-01-01 RX ADMIN — SODIUM BICARBONATE 650 MG TABLET 650 MG: at 10:05

## 2023-01-01 RX ADMIN — GUAIFENESIN 600 MG: 600 TABLET, EXTENDED RELEASE ORAL at 09:05

## 2023-01-01 RX ADMIN — PROPOFOL 30 MG: 10 INJECTION, EMULSION INTRAVENOUS at 02:08

## 2023-01-01 RX ADMIN — FENTANYL CITRATE 100 MCG: 50 INJECTION, SOLUTION INTRAMUSCULAR; INTRAVENOUS at 07:05

## 2023-01-01 RX ADMIN — MUPIROCIN: 20 OINTMENT TOPICAL at 09:05

## 2023-01-01 RX ADMIN — SODIUM BICARBONATE 650 MG TABLET 650 MG: at 09:05

## 2023-01-01 RX ADMIN — CLOPIDOGREL BISULFATE 75 MG: 75 TABLET ORAL at 06:05

## 2023-01-01 RX ADMIN — DEXAMETHASONE SODIUM PHOSPHATE 4 MG: 4 INJECTION, SOLUTION INTRA-ARTICULAR; INTRALESIONAL; INTRAMUSCULAR; INTRAVENOUS; SOFT TISSUE at 08:09

## 2023-01-01 RX ADMIN — MIDAZOLAM HYDROCHLORIDE 2 MG: 1 INJECTION, SOLUTION INTRAMUSCULAR; INTRAVENOUS at 07:09

## 2023-01-01 RX ADMIN — AMLODIPINE BESYLATE 10 MG: 5 TABLET ORAL at 09:05

## 2023-01-01 RX ADMIN — PROPOFOL 30 MG: 10 INJECTION, EMULSION INTRAVENOUS at 12:11

## 2023-01-01 RX ADMIN — FUROSEMIDE 20 MG: 20 TABLET ORAL at 09:05

## 2023-01-01 RX ADMIN — PANTOPRAZOLE SODIUM 40 MG: 40 TABLET, DELAYED RELEASE ORAL at 10:05

## 2023-01-01 RX ADMIN — Medication 6 MG: at 09:05

## 2023-01-01 RX ADMIN — METOPROLOL SUCCINATE 50 MG: 50 TABLET, EXTENDED RELEASE ORAL at 02:05

## 2023-01-01 RX ADMIN — SODIUM CHLORIDE: 9 INJECTION, SOLUTION INTRAVENOUS at 02:08

## 2023-01-01 RX ADMIN — SODIUM CHLORIDE: 9 INJECTION, SOLUTION INTRAVENOUS at 07:09

## 2023-01-01 RX ADMIN — LIDOCAINE HYDROCHLORIDE 50 MG: 20 INJECTION INTRAVENOUS at 02:08

## 2023-01-01 RX ADMIN — METOCLOPRAMIDE 10 MG: 5 INJECTION, SOLUTION INTRAMUSCULAR; INTRAVENOUS at 06:05

## 2023-01-01 RX ADMIN — SODIUM CHLORIDE, SODIUM GLUCONATE, SODIUM ACETATE, POTASSIUM CHLORIDE AND MAGNESIUM CHLORIDE: 526; 502; 368; 37; 30 INJECTION, SOLUTION INTRAVENOUS at 12:11

## 2023-01-01 RX ADMIN — SODIUM CHLORIDE, SODIUM GLUCONATE, SODIUM ACETATE, POTASSIUM CHLORIDE AND MAGNESIUM CHLORIDE: 526; 502; 368; 37; 30 INJECTION, SOLUTION INTRAVENOUS at 07:05

## 2023-01-01 RX ADMIN — SODIUM CHLORIDE 250 ML: 9 INJECTION, SOLUTION INTRAVENOUS at 08:08

## 2023-01-01 RX ADMIN — PHENYLEPHRINE HYDROCHLORIDE 100 MCG: 10 INJECTION INTRAVENOUS at 08:05

## 2023-01-01 RX ADMIN — ACETAMINOPHEN 650 MG: 325 TABLET, FILM COATED ORAL at 09:08

## 2023-01-01 RX ADMIN — PROPOFOL 40 MG: 10 INJECTION, EMULSION INTRAVENOUS at 12:11

## 2023-01-01 RX ADMIN — LIDOCAINE HYDROCHLORIDE 50 MG: 10 INJECTION, SOLUTION EPIDURAL; INFILTRATION; INTRACAUDAL; PERINEURAL at 07:09

## 2023-01-01 RX ADMIN — HYDRALAZINE HYDROCHLORIDE 50 MG: 50 TABLET, FILM COATED ORAL at 09:05

## 2023-01-01 RX ADMIN — PHENYLEPHRINE HYDROCHLORIDE 100 MCG: 10 INJECTION INTRAVENOUS at 07:05

## 2023-01-01 RX ADMIN — Medication 6 MG: at 12:05

## 2023-01-01 RX ADMIN — FENTANYL CITRATE 50 MCG: 50 INJECTION, SOLUTION INTRAMUSCULAR; INTRAVENOUS at 07:09

## 2023-01-01 RX ADMIN — ALLOPURINOL 100 MG: 100 TABLET ORAL at 06:05

## 2023-01-01 RX ADMIN — PANTOPRAZOLE SODIUM 40 MG: 40 INJECTION, POWDER, LYOPHILIZED, FOR SOLUTION INTRAVENOUS at 08:08

## 2023-01-01 RX ADMIN — AMLODIPINE BESYLATE 10 MG: 5 TABLET ORAL at 02:05

## 2023-01-01 RX ADMIN — ROCURONIUM BROMIDE 40 MG: 50 INJECTION INTRAVENOUS at 07:09

## 2023-01-01 RX ADMIN — ACETAMINOPHEN 1000 MG: 10 INJECTION, SOLUTION INTRAVENOUS at 08:05

## 2023-01-01 RX ADMIN — ONDANSETRON 4 MG: 2 INJECTION INTRAMUSCULAR; INTRAVENOUS at 08:05

## 2023-01-01 RX ADMIN — ATORVASTATIN CALCIUM 40 MG: 40 TABLET, FILM COATED ORAL at 10:05

## 2023-01-01 RX ADMIN — ACETAMINOPHEN 1000 MG: 500 TABLET, FILM COATED ORAL at 06:05

## 2023-01-01 RX ADMIN — MEPERIDINE HYDROCHLORIDE 12.5 MG: 25 INJECTION INTRAMUSCULAR; INTRAVENOUS; SUBCUTANEOUS at 08:05

## 2023-01-01 RX ADMIN — GUAIFENESIN 600 MG: 600 TABLET, EXTENDED RELEASE ORAL at 10:05

## 2023-01-01 RX ADMIN — ENOXAPARIN SODIUM 60 MG: 80 INJECTION SUBCUTANEOUS at 05:05

## 2023-01-01 RX ADMIN — SODIUM CHLORIDE 500 ML/HR: 9 INJECTION, SOLUTION INTRAVENOUS at 10:11

## 2023-01-01 RX ADMIN — SEVELAMER CARBONATE 800 MG: 800 TABLET, FILM COATED ORAL at 09:05

## 2023-01-01 RX ADMIN — ASPIRIN 325 MG: 325 TABLET, COATED ORAL at 05:05

## 2023-01-01 RX ADMIN — SUGAMMADEX 200 MG: 100 INJECTION, SOLUTION INTRAVENOUS at 08:05

## 2023-01-01 RX ADMIN — EPHEDRINE SULFATE 10 MG: 50 INJECTION INTRAVENOUS at 08:05

## 2023-01-01 RX ADMIN — SEVELAMER CARBONATE 800 MG: 800 TABLET, FILM COATED ORAL at 02:05

## 2023-01-31 DIAGNOSIS — K43.9 VENTRAL HERNIA WITHOUT OBSTRUCTION OR GANGRENE: Primary | ICD-10-CM

## 2023-02-13 ENCOUNTER — HOSPITAL ENCOUNTER (OUTPATIENT)
Dept: RADIOLOGY | Facility: HOSPITAL | Age: 78
Discharge: HOME | End: 2023-02-13
Attending: SURGERY
Payer: MEDICARE

## 2023-02-13 DIAGNOSIS — K43.9 VENTRAL HERNIA WITHOUT OBSTRUCTION OR GANGRENE: ICD-10-CM

## 2023-02-13 PROCEDURE — 74176 CT ABD & PELVIS W/O CONTRAST: CPT | Mod: TC

## 2023-05-13 PROBLEM — U07.1 LAB TEST POSITIVE FOR DETECTION OF COVID-19 VIRUS: Status: ACTIVE | Noted: 2023-01-01

## 2023-05-13 PROBLEM — R53.1 WEAKNESS: Status: ACTIVE | Noted: 2023-01-01

## 2023-05-13 NOTE — H&P
"[facility] Hospital Medicine History & Physical Examination       Patient Name: Deepti Zambrano  MRN: [id number]  Patient Class: OP- Observation   Admission Date: 5/13/2023   Admitting Physician: BENJY Service   Length of Stay: 0  Attending Physician: Lisa De La Cruz DO  Primary Care Provider: Saleem Juan II, MD  Face-to-Face encounter date: 05/13/2023  Code Status:Code Status Discussion Note   Chief Complaint: Weakness (Diagnosed with COVID a week ago, reports not getting better. Logged blood pressure since Wednesday and said it was "low at 104/67 and usually runs high", c/o weaknes. On peritoneal/hemodialysis, last full run last Tuesday. )        Patient information was obtained from patient, patient's family, past medical records and ER records.     HISTORY OF PRESENT ILLNESS:   Deepti Zambrano is a 78 y.o. female who  has a past medical history of Anticoagulant long-term use, Bladder cancer, Chronic kidney disease, unspecified, COPD (chronic obstructive pulmonary disease), GERD (gastroesophageal reflux disease), Hypertension, Kidney cancer, primary, with metastasis from kidney to other site, right, and Stroke.. The patient presented to [facility] on 5/13/2023.    Patient presents with worsening weakness that started about a week ago after being diagnosed with COVID, she was initially diagnosed with COVID after having runny nose and significant weakness.    Her appetite is poor as well as had or intake of fluids.  She lives independently and does all ADLs and I ADLs with no assistance.    She is on peritoneal dialysis Monday, Tuesday, Thursdays and Fridays and has not had dialysis since Tuesday.    She has had trouble with access being clotted and has seen her a nephrologist several times about this but it seems the issue has not been fully resolved.      She has no significant complaints of shortness of breath but states that along with the weakness her blood pressure has " been extremely low, lowest being 104/64, because of her hypotension she has not taken her BP medications.   She denies chest pain but does have a cardiologist, Dr. Gordon.   Vitals have been reviewed and are significantly benign.        PAST MEDICAL HISTORY:     Past Medical History:   Diagnosis Date    Anticoagulant long-term use     Bladder cancer     Chronic kidney disease, unspecified     COPD (chronic obstructive pulmonary disease)     GERD (gastroesophageal reflux disease)     Hypertension     Kidney cancer, primary, with metastasis from kidney to other site, right     Stroke     1997, small memory loss with left sided weakness       PAST SURGICAL HISTORY:     Past Surgical History:   Procedure Laterality Date    AV FISTULA PLACEMENT Left     BACK SURGERY      CATARACT EXTRACTION W/  INTRAOCULAR LENS IMPLANT Bilateral     CHOLECYSTECTOMY      CORONARY ANGIOPLASTY WITH STENT PLACEMENT      Dr. Strickland 18 years ago    DIAGNOSTIC LAPAROSCOPY N/A 7/28/2022    Procedure: LAPAROSCOPY, DIAGNOSTIC;  Surgeon: Edumnd Echols Jr., MD;  Location: [facility];  Service: General;  Laterality: N/A;    HYSTERECTOMY      NECK SURGERY      PERITONEAL CATHETER INSERTION      right kidney removed Right     TONSILLECTOMY         ALLERGIES:   Ciprofloxacin, Codeine, and Penicillins    FAMILY HISTORY:   Reviewed and negative    SOCIAL HISTORY:     Social History     Tobacco Use    Smoking status: Former    Smokeless tobacco: Never   Substance Use Topics    Alcohol use: Never        HOME MEDICATIONS:     Prior to Admission medications    Medication Sig Start Date End Date Taking? Authorizing Provider   allopurinoL (ZYLOPRIM) 100 MG tablet Take 100 mg by mouth Daily. 5/25/22   Historical Provider   amitriptyline (ELAVIL) 25 MG tablet Take 25 mg by mouth every evening. 5/9/22   Historical Provider   amLODIPine (NORVASC) 10 MG tablet Take 10 mg by mouth once daily. 5/25/22   Historical Provider   atorvastatin (LIPITOR) 40 MG tablet Take 40 mg  by mouth once daily. 5/25/22   Historical Provider   clopidogreL (PLAVIX) 75 mg tablet Take 75 mg by mouth once. 5/25/22   Historical Provider   furosemide (LASIX) 20 MG tablet Take 20 mg by mouth once daily. 5/9/22   Historical Provider   hydrALAZINE (APRESOLINE) 50 MG tablet Take 50 mg by mouth 3 (three) times daily.    Historical Provider   HYDROcodone-acetaminophen (NORCO) 5-325 mg per tablet Take 1 tablet by mouth every 6 (six) hours as needed for Pain. 7/28/22   BELKIS Winn   metoprolol succinate (TOPROL-XL) 50 MG 24 hr tablet Take 50 mg by mouth once daily. 5/7/22   Historical Provider   pantoprazole (PROTONIX) 40 MG tablet Take 40 mg by mouth once daily. 5/25/22   Historical Provider   sevelamer carbonate (RENVELA) 800 mg Tab Take 800 mg by mouth 3 (three) times daily with meals. 5/9/22   Historical Provider   sodium bicarbonate 650 MG tablet Take 650 mg by mouth 3 (three) times daily.    Historical Provider       REVIEW OF SYSTEMS:   Except as documented, all other systems reviewed and negative     PHYSICAL EXAM:     VITAL SIGNS: 24 HRS MIN & MAX LAST   Temp  Min: 98.5 °F (36.9 °C)  Max: 98.5 °F (36.9 °C) 98.5 °F (36.9 °C)   BP  Min: 118/56  Max: 142/59 (!) 142/59   Pulse  Min: 82  Max: 88  88   Resp  Min: 18  Max: 18 18   SpO2  Min: 95 %  Max: 95 % 95 %       General appearance: Well-developed, well-nourished female in no apparent distress.    HENT: Atraumatic head. Moist mucous membranes of oral cavity.  Eyes:  No conjunctivitis or scleral icterus.      Lungs:  Coarse breath sounds but no wheezing inspiratory or expiratory.  Heart: Regular rate and rhythm.  S1-S2 heard no murmur appreciated  Abdomen: Soft, non-distended, non-tender.  No rebound no guarding    Extremities:  No peripheral edema.   Neuro: Motor and sensory exams grossly intact.  Purposeful movement noted in upper and lower extremities      Psych/mental status: Appropriate mood and affect. Responds appropriately to questions.      LABS AND IMAGING:     Recent Labs   Lab 05/13/23  1355   WBC 11.50   RBC 3.48*   HGB 10.5*   HCT 34.2*   MCV 98.3*   MCH 30.2   MCHC 30.7*   RDW 17.1*      MPV 9.5       Recent Labs   Lab 05/13/23  1355      K 3.8   CO2 21*   BUN 61.3*   CREATININE 5.29*   CALCIUM 9.1   MG 2.60   ALBUMIN 3.4   ALKPHOS 92   ALT 22   AST 27   BILITOT 0.3       Microbiology Results (last 7 days)       ** No results found for the last 168 hours. **             X-Ray Chest 1 View  Narrative: EXAMINATION:  XR CHEST 1 VIEW    CLINICAL HISTORY:  Weakness    TECHNIQUE:  Single frontal view of the chest was performed.    COMPARISON:  06/17/2022    FINDINGS:  LINES AND TUBES: There is a left brachial stent.    MEDIASTINUM AND TATUM: The cardiac silhouette is normal. Aortic atherosclerosis.    LUNGS: Linear opacities at the left lung base, likely atelectasis.    PLEURA:No pleural effusion. No pneumothorax.    OTHER: No acute osseous abnormality.  Impression: Probable left basilar atelectasis.    Electronically signed by: Deepti Zapata  Date:    05/13/2023  Time:    12:41      ASSESSMENT & PLAN:   Generalized weakness   Peritoneal dialysis catheter dysfunction   ESRD  COVID positive   COPD without exacerbation  Macrocytic anemia  Hypertension  History of bladder cancer    ----------------------------------------------------------------------------------  Patient presents for weakness and hypotension likely secondary to COVID and poor oral intake.    Will add gentle IV hydration  Add incentive spirometer, cough Joy Kong    Patient does make urine does continue to monitor I&Os.    Not requiring supplemental oxygen at this time however continue to monitor and provide supplemental O2 if oxygen is less than 92%.    Hold off on antibiotic therapy at this time as she is not having no worsening respiratory symptoms, no cough, no mucus production, no leukocytosis and she remains afebrile.  No signs or symptoms  suggestive of systemic infection.    Peritoneal dialysis malfunction?  Will consult nephrologist.    NSTEMI-likely secondary to ESRD, consider consult to Cardiology if troponins trend up.  Continue cardiac optimization  Continue cardiac monitoring  Macrocytic Anemia -likely multifactorial in the setting of ESRD, check folate and B12 level, no need for blood transfusion at this time  Continue supportive care.  Consult PT/OT  Reviewed and restarted appropriate home medications.     VTE Prophylaxis: lovenox    Patient condition:  Stable    INPATIENT LIST OF MEDICATIONS     Scheduled Meds:   aspirin  325 mg Oral ED 1 Time    enoxaparin  1 mg/kg (Dosing Weight) Subcutaneous ED 1 Time     Continuous Infusions:  PRN Meds:.acetaminophen, acetaminophen, dextrose 50%, dextrose 50%, glucagon (human recombinant), glucose, glucose, ondansetron, sodium chloride 0.9%    Lisa De La Cruz DO   05/13/2023

## 2023-05-13 NOTE — ED PROVIDER NOTES
"Encounter Date: 5/13/2023       History     Chief Complaint   Patient presents with    Weakness     Diagnosed with COVID a week ago, reports not getting better. Logged blood pressure since Wednesday and said it was "low at 104/67 and usually runs high", c/o bety. On peritoneal/hemodialysis, last full run last Tuesday.      78-year-old female with a history of hypertension, COPD, CKD on peritoneal/hemodialysis, last full run on Tuesday, CAD with prior stent placement over a decade ago presents to emergency department for evaluation of progressively worsening generalized weakness, fatigue, dyspnea on exertion.  She reports that she was diagnosed with COVID about 10 days ago, states she has felt progressively worsened that time.  Reports that she was doing peritoneal dialysis however her PD catheter recently became nonfunctional.  She was supposed to have an outpatient procedure to address this; however, had to be canceled due to COVID-19 infection.  States that she was able to have hemodialysis as an outpatient last week and will scheduled to have hemodialysis again on Monday.  She denies associated chest pain.    The history is provided by the patient.   Shortness of Breath  This is a new problem. The problem occurs continuously.The current episode started more than 2 days ago. The problem has not changed since onset.Associated symptoms include cough. Pertinent negatives include no fever, no sputum production, no vomiting, no abdominal pain and no rash.   Review of patient's allergies indicates:   Allergen Reactions    Ciprofloxacin Itching    Codeine Itching    Penicillins Itching     Past Medical History:   Diagnosis Date    Anticoagulant long-term use     Bladder cancer     Chronic kidney disease, unspecified     COPD (chronic obstructive pulmonary disease)     GERD (gastroesophageal reflux disease)     Hypertension     Kidney cancer, primary, with metastasis from kidney to other site, right     Stroke     " 1997, small memory loss with left sided weakness     Past Surgical History:   Procedure Laterality Date    AV FISTULA PLACEMENT Left     BACK SURGERY      CATARACT EXTRACTION W/  INTRAOCULAR LENS IMPLANT Bilateral     CHOLECYSTECTOMY      CORONARY ANGIOPLASTY WITH STENT PLACEMENT      Dr. Strickland 18 years ago    DIAGNOSTIC LAPAROSCOPY N/A 7/28/2022    Procedure: LAPAROSCOPY, DIAGNOSTIC;  Surgeon: Edmund Echols Jr., MD;  Location: [facility];  Service: General;  Laterality: N/A;    HYSTERECTOMY      NECK SURGERY      PERITONEAL CATHETER INSERTION      right kidney removed Right     TONSILLECTOMY       No family history on file.  Social History     Tobacco Use    Smoking status: Former    Smokeless tobacco: Never   Substance Use Topics    Alcohol use: Never    Drug use: Never     Review of Systems   Constitutional:  Positive for fatigue. Negative for fever.   Respiratory:  Positive for cough and shortness of breath. Negative for sputum production.    Gastrointestinal:  Positive for nausea. Negative for abdominal pain and vomiting.   Skin:  Negative for rash.     Physical Exam     Initial Vitals [05/13/23 1218]   BP Pulse Resp Temp SpO2   (!) 118/56 82 18 98.5 °F (36.9 °C) 95 %      MAP       --         Physical Exam    Nursing note and vitals reviewed.  Constitutional: She appears well-developed and well-nourished. No distress.   HENT:   Head: Normocephalic and atraumatic.   Mouth/Throat: Oropharynx is clear and moist.   Eyes: Conjunctivae are normal. Pupils are equal, round, and reactive to light. Scleral icterus is present.   Neck: Neck supple. No JVD present.   Normal range of motion.  Cardiovascular:  Normal rate and regular rhythm.           Murmur heard.  Pulmonary/Chest: No respiratory distress. She has rhonchi.   Abdominal: Abdomen is soft. She exhibits no distension. There is no abdominal tenderness.   Musculoskeletal:         General: No edema.      Cervical back: Normal range of motion and neck supple.      Neurological: She is alert and oriented to person, place, and time. GCS score is 15. GCS eye subscore is 4. GCS verbal subscore is 5. GCS motor subscore is 6.   Skin: Skin is warm and dry.       ED Course   Procedures  Labs Reviewed   COMPREHENSIVE METABOLIC PANEL - Abnormal; Notable for the following components:       Result Value    Carbon Dioxide 21 (*)     Glucose Level 133 (*)     Blood Urea Nitrogen 61.3 (*)     Creatinine 5.29 (*)     Globulin 3.6 (*)     Albumin/Globulin Ratio 0.9 (*)     All other components within normal limits   TROPONIN I - Abnormal; Notable for the following components:    Troponin-I 0.112 (*)     All other components within normal limits   CBC WITH DIFFERENTIAL - Abnormal; Notable for the following components:    RBC 3.48 (*)     Hgb 10.5 (*)     Hct 34.2 (*)     MCV 98.3 (*)     MCHC 30.7 (*)     RDW 17.1 (*)     IG# 0.17 (*)     All other components within normal limits   MAGNESIUM - Normal   PROTIME-INR - Normal   CBC W/ AUTO DIFFERENTIAL    Narrative:     The following orders were created for panel order CBC Auto Differential.  Procedure                               Abnormality         Status                     ---------                               -----------         ------                     CBC with Differential[[id number]]        Abnormal            Final result                 Please view results for these tests on the individual orders.   URINALYSIS, REFLEX TO URINE CULTURE     EKG Readings: (Independently Interpreted)   Initial Reading: No STEMI. Rhythm: Normal Sinus Rhythm. Heart Rate: 80. Axis: Normal. Clinical Impression: Left Ventricular Hypertrophy (LDH) and Normal Sinus Rhythm   05/13/2023 @ 1221   ECG Results              EKG 12-lead (Final result)  Result time 05/13/23 12:29:00      Final result by Interface, Lab In [facility] (05/13/23 12:29:00)                   Narrative:    Test Reason : R53.1,    Vent. Rate : 080 BPM     Atrial Rate : 080 BPM     P-R Int :  174 ms          QRS Dur : 094 ms      QT Int : 412 ms       P-R-T Axes : 073 064 135 degrees     QTc Int : 475 ms    Normal sinus rhythm  LVH with repolarization abnormality ( Sokolow-Gillette )  Abnormal ECG  When compared with ECG of 17-JUN-2022 10:26,  No significant change was found  Confirmed by Norberto Harman MD ([id number]) on 5/13/2023 12:28:52 PM    Referred By:             Confirmed By:Norberto Harman MD                                  Imaging Results              X-Ray Chest 1 View (Final result)  Result time 05/13/23 12:41:47      Final result by Deepti Zapata MD (05/13/23 12:41:47)                   Impression:      Probable left basilar atelectasis.      Electronically signed by: Deepti Zapata  Date:    05/13/2023  Time:    12:41               Narrative:    EXAMINATION:  XR CHEST 1 VIEW    CLINICAL HISTORY:  Weakness    TECHNIQUE:  Single frontal view of the chest was performed.    COMPARISON:  06/17/2022    FINDINGS:  LINES AND TUBES: There is a left brachial stent.    MEDIASTINUM AND TATUM: The cardiac silhouette is normal. Aortic atherosclerosis.    LUNGS: Linear opacities at the left lung base, likely atelectasis.    PLEURA:No pleural effusion. No pneumothorax.    OTHER: No acute osseous abnormality.                                             Medical Decision Making  Problems Addressed:  COVID-19 virus infection: acute illness or injury  Dialysis patient: chronic illness or injury  Elevated troponin I level: acute illness or injury that poses a threat to life or bodily functions  Exertional dyspnea: acute illness or injury that poses a threat to life or bodily functions      ED assessment:    Ms. Zambrano presented for evaluation of increasing generalized weakness, fatigue, shortness of breath particularly with exertion in the setting of COVID-19 virus infection, diagnosed about 10 days ago.  Hemodynamically stable on ED arrival, SpO2 within normal limits on room air.  Rhonchi noted.  Dialysis  patient, usually peritoneal dialysis; however, acquired hemodialysis earlier in the week due to nonfunctioning PD cath, plan for hemodialysis again on Monday, usually only dialyzes 1-2 times per week.    Differential diagnosis (including but not limited to):     COVID-19 virus infection, acute bronchitis, pneumonia, CAD, ACS, CHF, myocarditis, pericarditis, volume overload, electrolyte derangements, CKD    ED management:   Laboratory evaluation notable for elevated BUN creatinine consistent with her CKD, no significant electrolyte derangements; however, note made of elevated troponin which is not baseline for the patient.  Certainly, some element of elevated troponin in CKD is not uncommon however the patient historically has negative cardiac enzymes when checked.  Additionally, with the worsening exertional dyspnea, concern that the COVID-19 virus infection may be causing some demand ischemia.  Will admit for observation, plan to trend cardiac enzymes, monitor for further progression of symptoms.  As she is not hypoxic and symptom onset nearly 2 weeks ago no indication for specific COVID-19 treatment this time.  Case discussed with cardiology who will follow in consultation.  Case discussed with hospital medicine who agrees with admission.    My independent radiology interpretation:   Chest x-ray:  No dense infiltrate or effusion, comprehensive interpretation as per radiologist above.      Amount and/or Complexity of Data Reviewed  Independent historian:  Family at bedside   Summary of history:  Reports initially diagnosed with COVID-19 last Wednesday, fatigued, sleeping all the time, complaining of shortness of breath, decreased appetite  External data reviewed: notes from clinic visits  Summary of data reviewed:  Previous clinic visits reviewed, seen by her nephrologist a little over 2 weeks ago with abdominal x-ray reporting PD catheter coiled in the pelvis  Risk and benefits of testing: discussed   Labs:  ordered and reviewed  Radiology: ordered and independent interpretation performed (see above or ED course)  ECG/medicine tests: ordered and independent interpretation performed (see above or ED course)  Discussion of management or test interpretation with external provider(s): discussed with hospitalist physician and discussed with cardiology consultant   Summary of discussion:  See ED course    Risk  Decision regarding hospitalization  Shared decision making     Critical Care  none    I, Gauri Byrd MD personally performed the history, PE, MDM, and procedures as documented above and agree with the scribe's documentation.           ED Course as of 05/13/23 1616   Sat May 13, 2023   1614 Case discussed with Symone Lim NP for the hospitalist service who accepts for admission. Discussed with RT Alison NP for the cardiology service, agrees trend enzymes, echo, will see in consultation.  [KS]      ED Course User Index  [KS] Gauri Byrd MD                 Clinical Impression:   Final diagnoses:  [R53.1] Weakness generalized  [R77.8] Elevated troponin I level (Primary)  [R06.09] Exertional dyspnea  [U07.1] COVID-19 virus infection  [Z99.2] Dialysis patient        ED Disposition Condition    Observation                 Gauri Byrd MD  05/14/23 0149

## 2023-05-13 NOTE — Clinical Note
Diagnosis: Elevated troponin I level [728814]   Future Attending Provider: RONN BARRAGAN [[id number]]   Admitting Provider:: RONN BARRAGAN [[id number]]

## 2023-05-13 NOTE — FIRST PROVIDER EVALUATION
Medical screening examination initiated.  I have conducted a focused provider triage encounter, findings are as follows:    Brief history of present illness:  Patient states that she was diagnosed with covid x 1 week ago. States that she does peritoneal dialysis. States low blood pressure at home with weakness and nausea.    There were no vitals filed for this visit.    Pertinent physical exam:  awake, alert    Brief workup plan:  Labs, EKG    Preliminary workup initiated; this workup will be continued and followed by the physician or advanced practice provider that is assigned to the patient when roomed.

## 2023-05-14 NOTE — PROGRESS NOTES
[facility]  Hospital Medicine Progress Note        Chief Complaint: weakness.     HPI:   78 y.o. female who  has a past medical history of Anticoagulant long-term use, Bladder cancer, Chronic kidney disease, unspecified, COPD (chronic obstructive pulmonary disease), GERD (gastroesophageal reflux disease), Hypertension, Kidney cancer, primary, with metastasis from kidney to other site, right, and Stroke.. The patient presented to [facility] on 5/13/2023.     Patient presents with worsening weakness that started about a week ago after being diagnosed with COVID, she was initially diagnosed with COVID after having runny nose and significant weakness.    Her appetite is poor as well as had or intake of fluids.  She lives independently and does all ADLs and I ADLs with no assistance.    She is on peritoneal dialysis Monday, Tuesday, Thursdays and Fridays and has not had dialysis since Tuesday.    She has had trouble with access being clotted and has seen her a nephrologist several times about this but it seems the issue has not been fully resolved.       She has no significant complaints of shortness of breath but states that along with the weakness her blood pressure has been extremely low, lowest being 104/64, because of her hypotension she has not taken her BP medications.   She denies chest pain but does have a cardiologist, Dr. Gordon.   Vitals have been reviewed and are significantly benign.       Interval Hx:   Dialysis today through left AV fistula.   Patient is a she is feeling much better and is eager to go home, cardiologist has seen evaluated patient and recommends continue medical optimization, no plans for further diagnostic studies during hospital course.  She is tolerating her meals, no chest pain no shortness of breath reported.  Therapist has been consulted but has not seen her yet.        Objective/physical exam:  General: Appears comfortable, no acute distress.  Integumentary:  Warm, dry, intact.  Neuro: awake, alert, oriented.     Musculoskeletal: Purposeful movement noted.   Respiratory: No accessory muscle use. Breath sounds are equal.  Cardiovascular: Regular rate. No peripheral edema.    VITAL SIGNS: 24 HRS MIN & MAX LAST   Temp  Min: 98.1 °F (36.7 °C)  Max: 98.5 °F (36.9 °C) 98.1 °F (36.7 °C)   BP  Min: 129/47  Max: 151/69 (!) 151/69   Pulse  Min: 77  Max: 86  80   Resp  Min: 16  Max: 20 18   SpO2  Min: 96 %  Max: 100 % 100 %     Echo  · Eccentric hypertrophy and mildly decreased systolic function.  · Mild pulmonic regurgitation.  · Mild tricuspid regurgitation.  · Moderate mitral regurgitation.  · Normal right ventricular size with normal right ventricular systolic   function.  · The estimated ejection fraction is 40%.  · Grade I left ventricular diastolic dysfunction.       Recent Labs   Lab 05/13/23  1355 05/14/23  0344   WBC 11.50 9.95   RBC 3.48* 3.02*   HGB 10.5* 9.1*   HCT 34.2* 29.6*   MCV 98.3* 98.0*   MCH 30.2 30.1   MCHC 30.7* 30.7*   RDW 17.1* 17.1*    268   MPV 9.5 9.5       Recent Labs   Lab 05/13/23  1355 05/14/23  0344    142   K 3.8 4.4   CO2 21* 20*   BUN 61.3* 62.6*   CREATININE 5.29* 4.96*   CALCIUM 9.1 8.3*   MG 2.60  --    ALBUMIN 3.4 2.8*   ALKPHOS 92 83   ALT 22 16   AST 27 25   BILITOT 0.3 0.3          Microbiology Results (last 7 days)       ** No results found for the last 168 hours. **             See below for Radiology    Scheduled Med:   allopurinoL  100 mg Oral Daily    amLODIPine  10 mg Oral Daily    atorvastatin  40 mg Oral Daily    clopidogreL  75 mg Oral Daily    furosemide  20 mg Oral Daily    guaiFENesin  600 mg Oral BID    hydrALAZINE  50 mg Oral TID    metoprolol succinate  50 mg Oral Daily    mupirocin   Nasal BID    pantoprazole  40 mg Oral Daily    sevelamer carbonate  800 mg Oral TID WM    sodium bicarbonate  650 mg Oral TID        Continuous Infusions:   sodium chloride 0.9% 50 mL/hr at 05/13/23 1702        PRN  Meds:  acetaminophen, acetaminophen, benzonatate, dextrose 10%, dextrose 10%, glucagon (human recombinant), glucose, glucose, melatonin, ondansetron, sodium chloride 0.9%     Nutrition Status:      Assessment/Plan:  Generalized weakness   Peritoneal dialysis catheter dysfunction   ESRD  COVID positive   COPD without exacerbation  Macrocytic anemia  Hypertension  History of bladder cancer     ----------------------------------------------------------------------------------  Patient presents for weakness and hypotension both of which have resolved.    Continue gentle IV hydration in the setting of poor oral intake/dehydration on admission.    COVID positive-continue supportive care, incentive spirometer, cough Tessalon Perles, Mucinex    Not requiring supplemental oxygen at this time however continue to monitor and provide supplemental O2 if oxygen is less than 92%.    No indication for antiviral and are antibiotic therapy at this time-  no worsening respiratory symptoms, no cough, no mucus production, no leukocytosis and she remains afebrile.  No signs or symptoms suggestive of systemic infection.     Peritoneal dialysis malfunction?  Patient underwent hemodialysis, will need to follow up on plans from nephrologist with regard to continuation of hemodialysis versus peritoneal dialysis.      NSTEMI---stable, Continue cardiac optimization  Continue cardiac monitoring, plan for outpatient follow-up possible ischemic testing    Anemia stable-Telles B12 within normal limits.  Likely of chronic disease secondary to ESRD    Continue supportive care.  Consult PT/OT  Reviewed and restarted appropriate home medications.   Consults nephrology and cardiology.     Anticipated discharge and Disposition:    All diagnosis and differential diagnosis have been reviewed,  interpreted and communicated appropriately to care team. assessment and plan has been documented; I have personally reviewed the labs and test results that are  presently available and pertinent to this hospital course; I have reviewed medical records based upon their availability.    All of the patient's questions have been  addressed and answered. Patient's is agreeable to the above stated plan.   I will continue to monitor closely and make adjustments to medical management as needed.          Lisa De La Cruz, DO   05/14/2023        This note was created with the assistance of Dragon voice recognition software. There may be transcription errors as a result of using this technology however minimal. Effort has been made to assure accuracy of transcription but any obvious errors or omissions should be clarified with the author of the document.

## 2023-05-14 NOTE — PROGRESS NOTES
Consult note  Nephrology    Admit Date: 5/13/2023   LOS: 0 days     SUBJECTIVE:     CC:  Weakness , ESRD    HPI:  79 Y/O female with history of ESRD currently on PD admitted to hospital for weakness , diagnosed with covid 2 weeks and since then feels weak   Also has problem with PD Catheter malfunction   Denies any SOB or any other problem     PMH:  1   ESRD  2   COPD  3   Right kidney CA with mets  S/P Right nephrectomy   4   bladder CA  5   H/O HTN  6   GERD    Allergy:  Cipro  Codeine  penicillin      Social history:  Former smoker  Neg alcohol    Family History:       neg    Review of Systems:  Constitutional: No fever or chills  Respiratory: No cough or shortness of breath  Cardiovascular: No chest pain or palpitations  Gastrointestinal: No nausea or vomiting, no diarrhea, no blood in stool or vomiting blood  Neurological: No confusion or disorientation   But weakness positive  Still urinating good with no dysuria , hematuria , or frequency    OBJECTIVE:     Vital Signs Range (Last 24H):  Vitals:    05/14/23 0814   BP: (!) 130/55   Pulse: 77   Resp:    Temp: 98.5 °F (36.9 °C)       Temp:  [98.5 °F (36.9 °C)]   Pulse:  [77-88]   Resp:  [16-20]   BP: (118-146)/(47-69)   SpO2:  [95 %-100 %]     I & O (Last 24H):No intake or output data in the 24 hours ending 05/14/23 0918    Physical Exam:  Alert , oriented , in NAD  Head   normal   Chest   symmetric , no Retraction   Lungs   clear   Heart   RRR  Abdomen   soft , non tender   Ext  no edema    Laboratory Data:    Recent Labs   Lab 05/14/23  0344      K 4.4   CO2 20*   BUN 62.6*   CREATININE 4.96*   GLUCOSE 82   CALCIUM 8.3*     Lab Results   Component Value Date    PTH 96.2 (H) 03/22/2018    CALCIUM 8.3 (L) 05/14/2023    PHOS 2.0 (L) 10/19/2021     Lab Results   Component Value Date    IRON 34 (L) 10/19/2021    TIBC 166 (L) 10/19/2021    FERRITIN 157.19 10/19/2021       Medications:  Medication list was reviewed and changes noted under  Assessment/Plan.    Diagnostic Results:    Reviewed most recent CT/US/Echo/MRI    ASSESSMENT/PLAN:   1   ESRD  on PD  malfunctioning of PD cath  lasdt dialysis Tuesday  5 days ago  2   COPD  3   COVID   4   Right nephrectomy  due to CA with mets  5   Bladder CA  6   HTN   H/O  7   Anemia    Plan   HD today  through the AVF left arm

## 2023-05-14 NOTE — NURSING
Nurses Note -- 4 Eyes      5/14/2023   10:50 AM      Skin assessed during: Admit      [x] No Altered Skin Integrity Present    []Prevention Measures Documented      [] Yes- Altered Skin Integrity Present or Discovered   [] LDA Added if Not in Epic (Describe Wound)   [] New Altered Skin Integrity was Present on Admit and Documented in LDA   [] Wound Image Taken    Wound Care Consulted? No    Attending Nurse:  Yayo Rodríguez RN     Second RN/Staff Member:  John Lim RN

## 2023-05-14 NOTE — CONSULTS
Inpatient consult to Cardiology  Consult performed by: BELKIS Edwards  Consult ordered by: Gauri Byrd MD  Reason for consult: Dyspnea/Abnormal Troponin      [facility] - Emergency Dept  Cardiology  Consult Note    Patient Name: Deepti Zambrano  MRN: [id number]  Admission Date: 5/13/2023  Hospital Length of Stay: 0 days  Code Status: Full Code   Attending Provider: Lisa De La Cruz DO   Consulting Provider: BELKIS Edwards  Primary Care Physician: Saleem Juan II, MD  Principal Problem:Weakness    Patient information was obtained from patient, past medical records, ER records, and primary team.     Subjective:   Consultation Reason: Dyspnea/Abnormal Troponin    HPI:   Ms. Zambrano is a 78 year old female, known to Dr. Gordon, who presented to the hospital with weakness after having been recently diagnosed with COVID-19. She denies CP. She does have history of Chronic SOB/BOWDEN, but reports no significant exertional symptoms this hospitalization. Hemodynamics are stable. She is saturating well on NC. Troponin values are flat and mildly abnormal with peak 0.11. EKG revealed SR with LVH. Chest Radiograph revealed findings consistent with left basilar atelectasis. [facility] is consulted for cardiac evaluation concerning abnormal troponin.     PMH: CAD, Hypertension, LVSD (Recovered to EF 60%), VHD- MR/TR, Sciatica, Hyperlipidemia, CVA/TIA, KARY, Renal Cancer, Bladder Cancer, ESRD (PD), THUY (No CPAP), CVD- 100% KARL (Moderate Left Disease), SOB/BOWDEN Chronic  PSH: AV Fistula, PD Catheter, Tonsillectomy, Hysterectomy, Nephrectomy, Neck Surgery, Cranial Procedure   Family History: Father- Old MI, Mother- Cancer  Social History: Tobacco- Former Smoker, Alcohol- Negative, Substance Abuse- Negative    Previous Cardiac Diagnostics:   Carotid US (8.25.22):  The study quality is average.   The gray scale analysis shows a total occlusionof the right internal carotid artery  40-59% stenosis in the proximal  left internal carotid artery based on Bluth Criteria.   Antegrade right vertebral artery flow.   Antegrade left vertebral artery flow.     Echocardiogram (8.25.22):  The study quality is average.   Left ventricular diastolic dimension Global left ventricular systolic function is normal. The left ventricular ejection fraction is 60%.   Mild calcification of the aortic valve is noted with adequate cuspal excursion.   Mild to moderate (1-2+) mitral and tricuspid regurgitation.  The pulmonary artery systolic pressure is 36 mmHg. The pulmonary artery appears to be normal.    [facility] (7.2013): Nonobstructive CAD.  Nuclear Stress Test (9/2012): + Ischemia    Review of patient's allergies indicates:   Allergen Reactions    Ciprofloxacin Itching    Codeine Itching    Penicillins Itching       No current facility-administered medications on file prior to encounter.     Current Outpatient Medications on File Prior to Encounter   Medication Sig    allopurinoL (ZYLOPRIM) 100 MG tablet Take 100 mg by mouth Daily.    amitriptyline (ELAVIL) 25 MG tablet Take 25 mg by mouth every evening.    amLODIPine (NORVASC) 10 MG tablet Take 10 mg by mouth once daily.    atorvastatin (LIPITOR) 40 MG tablet Take 40 mg by mouth once daily.    calcitRIOL (ROCALTROL) 0.5 MCG Cap Take 0.5 mcg by mouth once daily.    clopidogreL (PLAVIX) 75 mg tablet Take 75 mg by mouth once.    furosemide (LASIX) 20 MG tablet Take 40 mg by mouth once daily.    hydrALAZINE (APRESOLINE) 50 MG tablet Take 50 mg by mouth 3 (three) times daily.    magnesium oxide (MAG-OX) 400 mg (241.3 mg magnesium) tablet Take 400 mg by mouth once daily.    metoprolol succinate (TOPROL-XL) 50 MG 24 hr tablet Take 50 mg by mouth once daily.    pantoprazole (PROTONIX) 40 MG tablet Take 40 mg by mouth once daily.    sevelamer carbonate (RENVELA) 800 mg Tab Take 800 mg by mouth 3 (three) times daily with meals.    sodium bicarbonate 650 MG tablet Take 650 mg by mouth 3 (three) times daily.     valsartan (DIOVAN) 160 MG tablet Take 160 mg by mouth once daily.    HYDROcodone-acetaminophen (NORCO) 5-325 mg per tablet Take 1 tablet by mouth every 6 (six) hours as needed for Pain.     Review of Systems   Constitutional:         Generalized Weakness   Respiratory:  Positive for shortness of breath. Negative for chest tightness.    Cardiovascular:  Negative for chest pain.   All other systems reviewed and are negative.    Objective:     Vital Signs (Most Recent):  Temp: 98.5 °F (36.9 °C) (05/13/23 1218)  Pulse: 79 (05/14/23 0600)  Resp: 16 (05/14/23 0300)  BP: (!) 129/47 (05/14/23 0300)  SpO2: 98 % (05/14/23 0300) Vital Signs (24h Range):  Temp:  [98.5 °F (36.9 °C)] 98.5 °F (36.9 °C)  Pulse:  [77-88] 79  Resp:  [16-20] 16  SpO2:  [95 %-98 %] 98 %  BP: (118-146)/(47-69) 129/47     Weight: 60.8 kg (134 lb 0.6 oz)  Body mass index is 25.33 kg/m².    SpO2: 98 %       No intake or output data in the 24 hours ending 05/14/23 0757    Lines/Drains/Airways       Drain  Duration                  Hemodialysis AV Fistula 07/01/21 0800 Left upper arm 681 days              Peripheral Intravenous Line  Duration                  Peripheral IV - Single Lumen 05/13/23 1721 20 G Right Antecubital <1 day                    Significant Labs:  Recent Results (from the past 72 hour(s))   Comprehensive Metabolic Panel    Collection Time: 05/13/23  1:55 PM   Result Value Ref Range    Sodium Level 140 136 - 145 mmol/L    Potassium Level 3.8 3.5 - 5.1 mmol/L    Chloride 106 98 - 107 mmol/L    Carbon Dioxide 21 (L) 23 - 31 mmol/L    Glucose Level 133 (H) 82 - 115 mg/dL    Blood Urea Nitrogen 61.3 (H) 9.8 - 20.1 mg/dL    Creatinine 5.29 (H) 0.55 - 1.02 mg/dL    Calcium Level Total 9.1 8.4 - 10.2 mg/dL    Protein Total 7.0 5.8 - 7.6 gm/dL    Albumin Level 3.4 3.4 - 4.8 g/dL    Globulin 3.6 (H) 2.4 - 3.5 gm/dL    Albumin/Globulin Ratio 0.9 (L) 1.1 - 2.0 ratio    Bilirubin Total 0.3 <=1.5 mg/dL    Alkaline Phosphatase 92 40 - 150 unit/L     Alanine Aminotransferase 22 0 - 55 unit/L    Aspartate Aminotransferase 27 5 - 34 unit/L    eGFR 8 mls/min/1.73/m2   Troponin I    Collection Time: 05/13/23  1:55 PM   Result Value Ref Range    Troponin-I 0.112 (H) 0.000 - 0.045 ng/mL   CBC with Differential    Collection Time: 05/13/23  1:55 PM   Result Value Ref Range    WBC 11.50 4.50 - 11.50 x10(3)/mcL    RBC 3.48 (L) 4.20 - 5.40 x10(6)/mcL    Hgb 10.5 (L) 12.0 - 16.0 g/dL    Hct 34.2 (L) 37.0 - 47.0 %    MCV 98.3 (H) 80.0 - 94.0 fL    MCH 30.2 27.0 - 31.0 pg    MCHC 30.7 (L) 33.0 - 36.0 g/dL    RDW 17.1 (H) 11.5 - 17.0 %    Platelet 314 130 - 400 x10(3)/mcL    MPV 9.5 7.4 - 10.4 fL    Neut % 76.0 %    Lymph % 15.2 %    Mono % 5.4 %    Eos % 1.5 %    Basophil % 0.4 %    Lymph # 1.75 0.6 - 4.6 x10(3)/mcL    Neut # 8.74 2.1 - 9.2 x10(3)/mcL    Mono # 0.62 0.1 - 1.3 x10(3)/mcL    Eos # 0.17 0 - 0.9 x10(3)/mcL    Baso # 0.05 <=0.2 x10(3)/mcL    IG# 0.17 (H) 0 - 0.04 x10(3)/mcL    IG% 1.5 %    NRBC% 0.0 %   Magnesium    Collection Time: 05/13/23  1:55 PM   Result Value Ref Range    Magnesium Level 2.60 1.60 - 2.60 mg/dL   Protime-INR    Collection Time: 05/13/23  1:55 PM   Result Value Ref Range    PT 13.3 12.5 - 14.5 seconds    INR 1.02 0.00 - 1.30   Troponin I    Collection Time: 05/13/23  4:27 PM   Result Value Ref Range    Troponin-I 0.047 (H) 0.000 - 0.045 ng/mL   Urinalysis, Reflex to Urine Culture    Collection Time: 05/13/23  5:31 PM    Specimen: Urine   Result Value Ref Range    Color, UA Yellow Yellow, Light-Yellow, Dark Yellow, Rita, Straw    Appearance, UA Clear Clear    Specific Gravity, UA 1.012 1.005 - 1.030    pH, UA 5.5 5.0 - 8.5    Protein, UA 3+ (A) Negative mg/dL    Glucose, UA Negative Negative, Normal mg/dL    Ketones, UA Negative Negative mg/dL    Blood, UA Negative Negative unit/L    Bilirubin, UA Negative Negative mg/dL    Urobilinogen, UA 0.2 0.2, 1.0, Normal mg/dL    Nitrites, UA Negative Negative    Leukocyte Esterase, UA Negative  Negative unit/L   Urinalysis, Microscopic    Collection Time: 05/13/23  5:31 PM   Result Value Ref Range    RBC, UA <5 <=5 /HPF    WBC, UA <5 <=5 /HPF    Squamous Epithelial Cells, UA 8 (H) <=5 /HPF    Bacteria, UA None Seen None Seen, Rare, Occasional /HPF   Troponin I    Collection Time: 05/13/23 10:33 PM   Result Value Ref Range    Troponin-I 0.043 0.000 - 0.045 ng/mL   Troponin I    Collection Time: 05/14/23  3:44 AM   Result Value Ref Range    Troponin-I 0.047 (H) 0.000 - 0.045 ng/mL   Comprehensive Metabolic Panel (CMP)    Collection Time: 05/14/23  3:44 AM   Result Value Ref Range    Sodium Level 142 136 - 145 mmol/L    Potassium Level 4.4 3.5 - 5.1 mmol/L    Chloride 109 (H) 98 - 107 mmol/L    Carbon Dioxide 20 (L) 23 - 31 mmol/L    Glucose Level 82 82 - 115 mg/dL    Blood Urea Nitrogen 62.6 (H) 9.8 - 20.1 mg/dL    Creatinine 4.96 (H) 0.55 - 1.02 mg/dL    Calcium Level Total 8.3 (L) 8.4 - 10.2 mg/dL    Protein Total 5.7 (L) 5.8 - 7.6 gm/dL    Albumin Level 2.8 (L) 3.4 - 4.8 g/dL    Globulin 2.9 2.4 - 3.5 gm/dL    Albumin/Globulin Ratio 1.0 (L) 1.1 - 2.0 ratio    Bilirubin Total 0.3 <=1.5 mg/dL    Alkaline Phosphatase 83 40 - 150 unit/L    Alanine Aminotransferase 16 0 - 55 unit/L    Aspartate Aminotransferase 25 5 - 34 unit/L    eGFR 9 mls/min/1.73/m2   Folate    Collection Time: 05/14/23  3:44 AM   Result Value Ref Range    Folate Level 19.2 7.0 - 31.4 ng/mL   Sedimentation rate    Collection Time: 05/14/23  3:44 AM   Result Value Ref Range    Sed Rate 63 (H) 0 - 20 mm/hr   C-Reactive Protein    Collection Time: 05/14/23  3:44 AM   Result Value Ref Range    C-Reactive Protein 11.00 (H) <5.00 mg/L   CBC with Differential    Collection Time: 05/14/23  3:44 AM   Result Value Ref Range    WBC 9.95 4.50 - 11.50 x10(3)/mcL    RBC 3.02 (L) 4.20 - 5.40 x10(6)/mcL    Hgb 9.1 (L) 12.0 - 16.0 g/dL    Hct 29.6 (L) 37.0 - 47.0 %    MCV 98.0 (H) 80.0 - 94.0 fL    MCH 30.1 27.0 - 31.0 pg    MCHC 30.7 (L) 33.0 - 36.0 g/dL     RDW 17.1 (H) 11.5 - 17.0 %    Platelet 268 130 - 400 x10(3)/mcL    MPV 9.5 7.4 - 10.4 fL    Neut % 53.4 %    Lymph % 33.6 %    Mono % 8.4 %    Eos % 2.6 %    Basophil % 0.6 %    Lymph # 3.34 0.6 - 4.6 x10(3)/mcL    Neut # 5.31 2.1 - 9.2 x10(3)/mcL    Mono # 0.84 0.1 - 1.3 x10(3)/mcL    Eos # 0.26 0 - 0.9 x10(3)/mcL    Baso # 0.06 <=0.2 x10(3)/mcL    IG# 0.14 (H) 0 - 0.04 x10(3)/mcL    IG% 1.4 %    NRBC% 0.0 %   Echo    Collection Time: 05/14/23  6:42 AM   Result Value Ref Range    TDI SEPTAL 0.04 m/s    LV LATERAL E/E' RATIO 11.44 m/s    LV SEPTAL E/E' RATIO 25.75 m/s    EF 45 %    Left Ventricular Outflow Tract Mean Velocity 0.71 cm/s    Left Ventricular Outflow Tract Mean Gradient 2.00 mmHg    TDI LATERAL 0.09 m/s    PV PEAK VELOCITY 1.15 cm/s    LVIDd 5.18 3.5 - 6.0 cm    IVS 1.10 0.6 - 1.1 cm    Posterior Wall 0.85 0.6 - 1.1 cm    LVIDs 4.02 (A) 2.1 - 4.0 cm    FS 22 28 - 44 %    LV mass 186.53 g    LA size 4.10 cm    RVDD 3.24 cm    TAPSE 2.11 cm    Left Ventricle Relative Wall Thickness 0.33 cm    AV mean gradient 7 mmHg    AV Velocity Ratio 0.62     AV index (prosthetic) 0.66     E/A ratio 0.81     Mean e' 0.07 m/s    E wave deceleration time 187.00 msec    LVOT peak ho 1.08 m/s    LVOT peak VTI 20.00 cm    Ao peak ho 1.75 m/s    Ao VTI 30.3 cm    AV peak gradient 12 mmHg    E/E' ratio 15.85 m/s    MV Peak E Ho 1.03 m/s    TR Max Ho 2.71 m/s    MV Peak A Ho 1.27 m/s    LV Systolic Volume 70.80 mL    LV Diastolic Volume 128.00 mL    Triscuspid Valve Regurgitation Peak Gradient 29 mmHg    LA volume (mod) 67.60 cm3    Diaz's Biplane MOD Ejection Fraction 4 %       Significant Imaging:  Imaging Results              X-Ray Chest 1 View (Final result)  Result time 05/13/23 12:41:47      Final result by Deepti Zaapta MD (05/13/23 12:41:47)                   Impression:      Probable left basilar atelectasis.      Electronically signed by: Deepti Zapata  Date:    05/13/2023  Time:    12:41                Narrative:    EXAMINATION:  XR CHEST 1 VIEW    CLINICAL HISTORY:  Weakness    TECHNIQUE:  Single frontal view of the chest was performed.    COMPARISON:  06/17/2022    FINDINGS:  LINES AND TUBES: There is a left brachial stent.    MEDIASTINUM AND TATUM: The cardiac silhouette is normal. Aortic atherosclerosis.    LUNGS: Linear opacities at the left lung base, likely atelectasis.    PLEURA:No pleural effusion. No pneumothorax.    OTHER: No acute osseous abnormality.                                    Telemetry:  Sinus Rhythm    Physical Exam  Vitals and nursing note reviewed.   Constitutional:       Appearance: Normal appearance.   HENT:      Head: Normocephalic.      Mouth/Throat:      Mouth: Mucous membranes are moist.      Pharynx: Oropharynx is clear.   Cardiovascular:      Rate and Rhythm: Normal rate and regular rhythm.      Heart sounds: Normal heart sounds.   Pulmonary:      Effort: Pulmonary effort is normal. No respiratory distress.   Abdominal:      Palpations: Abdomen is soft.   Musculoskeletal:         General: Normal range of motion.      Cervical back: Neck supple.   Skin:     General: Skin is warm and dry.   Neurological:      General: No focal deficit present.      Mental Status: She is alert and oriented to person, place, and time. Mental status is at baseline.   Psychiatric:         Mood and Affect: Mood normal.         Behavior: Behavior normal.         Judgment: Judgment normal.     Home Medications:   No current facility-administered medications on file prior to encounter.     Current Outpatient Medications on File Prior to Encounter   Medication Sig Dispense Refill    allopurinoL (ZYLOPRIM) 100 MG tablet Take 100 mg by mouth Daily.      amitriptyline (ELAVIL) 25 MG tablet Take 25 mg by mouth every evening.      amLODIPine (NORVASC) 10 MG tablet Take 10 mg by mouth once daily.      atorvastatin (LIPITOR) 40 MG tablet Take 40 mg by mouth once daily.      calcitRIOL (ROCALTROL) 0.5 MCG Cap  Take 0.5 mcg by mouth once daily.      clopidogreL (PLAVIX) 75 mg tablet Take 75 mg by mouth once.      furosemide (LASIX) 20 MG tablet Take 40 mg by mouth once daily.      hydrALAZINE (APRESOLINE) 50 MG tablet Take 50 mg by mouth 3 (three) times daily.      magnesium oxide (MAG-OX) 400 mg (241.3 mg magnesium) tablet Take 400 mg by mouth once daily.      metoprolol succinate (TOPROL-XL) 50 MG 24 hr tablet Take 50 mg by mouth once daily.      pantoprazole (PROTONIX) 40 MG tablet Take 40 mg by mouth once daily.      sevelamer carbonate (RENVELA) 800 mg Tab Take 800 mg by mouth 3 (three) times daily with meals.      sodium bicarbonate 650 MG tablet Take 650 mg by mouth 3 (three) times daily.      valsartan (DIOVAN) 160 MG tablet Take 160 mg by mouth once daily.      HYDROcodone-acetaminophen (NORCO) 5-325 mg per tablet Take 1 tablet by mouth every 6 (six) hours as needed for Pain. 10 tablet 0     Current Inpatient Medications:    Current Facility-Administered Medications:     0.9%  NaCl infusion, , Intravenous, Continuous, Lisa De La Cruz DO, Last Rate: 50 mL/hr at 05/13/23 1702, New Bag at 05/13/23 1702    acetaminophen tablet 650 mg, 650 mg, Oral, Q8H PRN, Symone Lim PA-C    acetaminophen tablet 650 mg, 650 mg, Oral, Q4H PRN, Symone Lim PA-C    allopurinoL tablet 100 mg, 100 mg, Oral, Daily, Lisa De La Cruz DO, 100 mg at 05/13/23 1802    amLODIPine tablet 10 mg, 10 mg, Oral, Daily, Lisa De La Cruz DO    atorvastatin tablet 40 mg, 40 mg, Oral, Daily, Lisa De La Cruz DO    benzonatate capsule 100 mg, 100 mg, Oral, TID PRN, Lisa De La Cruz DO    dextrose 10% bolus 125 mL 125 mL, 12.5 g, Intravenous, PRN, Symone Lim PA-C    dextrose 10% bolus 250 mL 250 mL, 25 g, Intravenous, PRN, Symone Lim PA-C    furosemide tablet 20 mg, 20 mg, Oral, Daily, Lisa De La Cruz DO    glucagon (human recombinant) injection 1 mg, 1 mg, Intramuscular, PRN, Symone Lim, ANETTE    glucose chewable  tablet 16 g, 16 g, Oral, PRN, Symone Lim PA-C    glucose chewable tablet 24 g, 24 g, Oral, PRN, Symone Lim PA-C    guaiFENesin 12 hr tablet 600 mg, 600 mg, Oral, BID, Lisa De La Cruz, DO, 600 mg at 05/13/23 2108    hydrALAZINE tablet 50 mg, 50 mg, Oral, TID, Lisa LUPE De La Cruz, DO, 50 mg at 05/13/23 2108    melatonin tablet 6 mg, 6 mg, Oral, Nightly PRN, Gabrielle Rollins, AGACNP-BC, 6 mg at 05/14/23 0027    metoprolol succinate (TOPROL-XL) 24 hr tablet 50 mg, 50 mg, Oral, Daily, Lisa De La Cruz, DO    ondansetron injection 4 mg, 4 mg, Intravenous, Q4H PRN, Symone Lim PA-C    pantoprazole EC tablet 40 mg, 40 mg, Oral, Daily, Lisa LUPE Jono, DO    sevelamer carbonate tablet 800 mg, 800 mg, Oral, TID WM, Lisa LUPE De La Cruz, DO, 800 mg at 05/14/23 0728    sodium bicarbonate tablet 650 mg, 650 mg, Oral, TID, Lisa LUPE De La Cruz, DO, 650 mg at 05/13/23 2107    sodium chloride 0.9% flush 10 mL, 10 mL, Intravenous, Q12H PRN, Symone Lim PA-C    Current Outpatient Medications:     allopurinoL (ZYLOPRIM) 100 MG tablet, Take 100 mg by mouth Daily., Disp: , Rfl:     amitriptyline (ELAVIL) 25 MG tablet, Take 25 mg by mouth every evening., Disp: , Rfl:     amLODIPine (NORVASC) 10 MG tablet, Take 10 mg by mouth once daily., Disp: , Rfl:     atorvastatin (LIPITOR) 40 MG tablet, Take 40 mg by mouth once daily., Disp: , Rfl:     calcitRIOL (ROCALTROL) 0.5 MCG Cap, Take 0.5 mcg by mouth once daily., Disp: , Rfl:     clopidogreL (PLAVIX) 75 mg tablet, Take 75 mg by mouth once., Disp: , Rfl:     furosemide (LASIX) 20 MG tablet, Take 40 mg by mouth once daily., Disp: , Rfl:     hydrALAZINE (APRESOLINE) 50 MG tablet, Take 50 mg by mouth 3 (three) times daily., Disp: , Rfl:     magnesium oxide (MAG-OX) 400 mg (241.3 mg magnesium) tablet, Take 400 mg by mouth once daily., Disp: , Rfl:     metoprolol succinate (TOPROL-XL) 50 MG 24 hr tablet, Take 50 mg by mouth once daily., Disp: , Rfl:     pantoprazole (PROTONIX) 40  MG tablet, Take 40 mg by mouth once daily., Disp: , Rfl:     sevelamer carbonate (RENVELA) 800 mg Tab, Take 800 mg by mouth 3 (three) times daily with meals., Disp: , Rfl:     sodium bicarbonate 650 MG tablet, Take 650 mg by mouth 3 (three) times daily., Disp: , Rfl:     valsartan (DIOVAN) 160 MG tablet, Take 160 mg by mouth once daily., Disp: , Rfl:     HYDROcodone-acetaminophen (NORCO) 5-325 mg per tablet, Take 1 tablet by mouth every 6 (six) hours as needed for Pain., Disp: 10 tablet, Rfl: 0    VTE Risk Mitigation (From admission, onward)      None          Assessment:     See below MDM component performed by me (Willam Gordon MD):    Elevated Troponin in the Setting of Recent COVID-19 Infection/ESRD    - No Ischemic Symptoms Noted, she did have some more SOB with her COVID    - EKG SR with LVH (some ST changes, but also had some on prior EKG last year)    - Flat Trend with Peak Value 0.11    - Do not suspect this is ACS, she looks fine now  CAD (Nonobstructive in 2013)  Hypertension (BP Stable)- Reported Recent Hypotension where BP Agents were scaled back  Hyperlipidemia  History of LVSD with Recovered EF    - EF about 40% again on current echo, possibly related to recent COVID, can get outpt workup  VHD    - MR/TR  Generalized Weakness- Recovering from Recent COVID-19 Infection  History of CVA/TIA  ESRD/PD (Monday/Tuesday/Thursday/Friday)  KARY  History of Renal Cancer Status Post Nephrectomy  History of Bladder Cancer  Former Smoker  Chronic BOWDEN/SOB (Stable)  Anemia  CVD    - 100% KARL, Moderate LICA  THUY (No CPAP)  Sciatica  No known History of GI Bleed    Plan:   Continue Current Cardiac Medications including Beta Blockade/Amlodipine/Statin. Monitor BP Closely.   Resume Home Plavix 75 Mg PO Daily  Cont the BB and ARB, will arrange for outpt f/u for possible ischemic testing  Will sign off, please call if more questions    Thank you for your consult.     BELKIS Edwards and Willam Gordon MD  Cardiology  [facility] - Emergency Dept  05/14/2023 7:57 AM

## 2023-05-14 NOTE — PLAN OF CARE
Problem: Adult Inpatient Plan of Care  Goal: Plan of Care Review  Outcome: Ongoing, Progressing  Goal: Patient-Specific Goal (Individualized)  Outcome: Ongoing, Progressing  Goal: Absence of Hospital-Acquired Illness or Injury  Outcome: Ongoing, Progressing  Goal: Optimal Comfort and Wellbeing  Outcome: Ongoing, Progressing  Goal: Readiness for Transition of Care  Outcome: Ongoing, Progressing     Problem: Device-Related Complication Risk (Hemodialysis)  Goal: Safe, Effective Therapy Delivery  Outcome: Ongoing, Progressing     Problem: Hemodynamic Instability (Hemodialysis)  Goal: Effective Tissue Perfusion  Outcome: Ongoing, Progressing     Problem: Infection (Hemodialysis)  Goal: Absence of Infection Signs and Symptoms  Outcome: Ongoing, Progressing

## 2023-05-15 NOTE — DISCHARGE SUMMARY
[facility] Hospital Medicine Discharge Summary    Admit Date: 5/13/2023  Discharge Date and Time: 5/15/77044:15 AM  Admitting Physician: Hospitalist team   Discharging Physician: Roger Sandhu MD.  Primary Care Physician: Saleem Juan II, MD      Discharge Diagnoses:  Generalized weakness   Peritoneal dialysis catheter dysfunction   ESRD  COVID positive   COPD without exacerbation  Macrocytic anemia  Hypertension  History of bladder cancer       Hospital Course:   78 y.o. female who  has a past medical history of Anticoagulant long-term use, Bladder cancer, Chronic kidney disease, unspecified, COPD (chronic obstructive pulmonary disease), GERD (gastroesophageal reflux disease), Hypertension, Kidney cancer, primary, with metastasis from kidney to other site, right, and Stroke.. The patient presented to [facility] on 5/13/2023.     Patient presents with worsening weakness that started about a week ago after being diagnosed with COVID, she was initially diagnosed with COVID after having runny nose and significant weakness.    Her appetite is poor as well as had or intake of fluids.  She lives independently and does all ADLs and I ADLs with no assistance.    She is on peritoneal dialysis Monday, Tuesday, Thursdays and Fridays and has not had dialysis since Tuesday.    She has had trouble with access being clotted and has seen her a nephrologist several times about this but it seems the issue has not been fully resolved.       She has no significant complaints of shortness of breath but states that along with the weakness her blood pressure has been extremely low, lowest being 104/64, because of her hypotension she has not taken her BP medications.   She denies chest pain but does have a cardiologist, Dr. Gordon.   Vitals have been reviewed and are significantly benign.      Cardiology evaluated.  Recommended continuing medical management follow up in the clinic.  No planned inpatient  "intervention.  Nephrology ran her for hemodialysis over the weekend.  She was feeling significantly better and asking to go home.  I would like Nephrology to evaluate her 1 more time prior to discharge just to see if she needs to continue with hemodialysis or PD as scheduled.  It seems that her PD catheter is now functioning.  She denies any weakness in his ambulating freely.  She does have some mild wheezing but she states that this has been since she had COVID a few weeks ago but otherwise remains on room air and able to complete daily activities without any issue.  She does not want any steroids at this time.     Vitals:  Blood pressure (!) 143/54, pulse 83, temperature 98.5 °F (36.9 °C), temperature source Oral, resp. rate 18, height 5' 1" (1.549 m), weight 60.8 kg (134 lb), SpO2 (!) 92 %..    Physical Exam:  Awake, Alert, Oriented x 3, No new focal Neurologic deficit, Normal Affect  NC/AT, PERRLA, EOMI  Supple neck, no JVD, No cervical lymphadenopathy  Symmetrical chest, Good air entry bilaterally. No rhonchi, wheezes, crackles appreciated  RRR, No gallop, rub or murmur  +ve Bowel sounds x4, Abd soft and non tender, no rebound, guarding or rigidity +PD catheter  No Cyanosis, cludding or edema, No new rash or bruises    Procedures Performed: No admission procedures for hospital encounter.     Significant Diagnostic Studies: See Full reports for all details  Admission on 05/13/2023   Component Date Value    Sodium Level 05/13/2023 140     Potassium Level 05/13/2023 3.8     Chloride 05/13/2023 106     Carbon Dioxide 05/13/2023 21 (L)     Glucose Level 05/13/2023 133 (H)     Blood Urea Nitrogen 05/13/2023 61.3 (H)     Creatinine 05/13/2023 5.29 (H)     Calcium Level Total 05/13/2023 9.1     Protein Total 05/13/2023 7.0     Albumin Level 05/13/2023 3.4     Globulin 05/13/2023 3.6 (H)     Albumin/Globulin Ratio 05/13/2023 0.9 (L)     Bilirubin Total 05/13/2023 0.3     Alkaline Phosphatase 05/13/2023 92     Alanine " Aminotransferase 05/13/2023 22     Aspartate Aminotransfera* 05/13/2023 27     eGFR 05/13/2023 8     Troponin-I 05/13/2023 0.112 (H)     Color, UA 05/13/2023 Yellow     Appearance, UA 05/13/2023 Clear     Specific Gravity, UA 05/13/2023 1.012     pH, UA 05/13/2023 5.5     Protein, UA 05/13/2023 3+ (A)     Glucose, UA 05/13/2023 Negative     Ketones, UA 05/13/2023 Negative     Blood, UA 05/13/2023 Negative     Bilirubin, UA 05/13/2023 Negative     Urobilinogen, UA 05/13/2023 0.2     Nitrites, UA 05/13/2023 Negative     Leukocyte Esterase, UA 05/13/2023 Negative     WBC 05/13/2023 11.50     RBC 05/13/2023 3.48 (L)     Hgb 05/13/2023 10.5 (L)     Hct 05/13/2023 34.2 (L)     MCV 05/13/2023 98.3 (H)     MCH 05/13/2023 30.2     MCHC 05/13/2023 30.7 (L)     RDW 05/13/2023 17.1 (H)     Platelet 05/13/2023 314     MPV 05/13/2023 9.5     Neut % 05/13/2023 76.0     Lymph % 05/13/2023 15.2     Mono % 05/13/2023 5.4     Eos % 05/13/2023 1.5     Basophil % 05/13/2023 0.4     Lymph # 05/13/2023 1.75     Neut # 05/13/2023 8.74     Mono # 05/13/2023 0.62     Eos # 05/13/2023 0.17     Baso # 05/13/2023 0.05     IG# 05/13/2023 0.17 (H)     IG% 05/13/2023 1.5     NRBC% 05/13/2023 0.0     Magnesium Level 05/13/2023 2.60     PT 05/13/2023 13.3     INR 05/13/2023 1.02     TDI SEPTAL 05/14/2023 0.04     LV LATERAL E/E' RATIO 05/14/2023 11.44     LV SEPTAL E/E' RATIO 05/14/2023 25.75     EF 05/14/2023 40     Left Ventricular Outflow* 05/14/2023 0.71     Left Ventricular Outflow* 05/14/2023 2.00     TDI LATERAL 05/14/2023 0.09     PV PEAK VELOCITY 05/14/2023 1.15     LVIDd 05/14/2023 5.18     IVS 05/14/2023 1.10     Posterior Wall 05/14/2023 0.85     LVIDs 05/14/2023 4.02 (A)     FS 05/14/2023 22     LV mass 05/14/2023 186.53     LA size 05/14/2023 4.10     RVDD 05/14/2023 3.24     TAPSE 05/14/2023 2.11     Left Ventricle Relative * 05/14/2023 0.33     AV mean gradient 05/14/2023 7     AV Velocity Ratio 05/14/2023 0.62     AV index  (prosthetic) 05/14/2023 0.66     E/A ratio 05/14/2023 0.81     Mean e' 05/14/2023 0.07     E wave deceleration time 05/14/2023 187.00     LVOT peak ho 05/14/2023 1.08     LVOT peak VTI 05/14/2023 20.00     Ao peak ho 05/14/2023 1.75     Ao VTI 05/14/2023 30.3     AV peak gradient 05/14/2023 12     E/E' ratio 05/14/2023 15.85     MV Peak E Ho 05/14/2023 1.03     TR Max Ho 05/14/2023 2.71     MV Peak A Ho 05/14/2023 1.27     LV Systolic Volume 05/14/2023 70.80     LV Diastolic Volume 05/14/2023 128.00     Triscuspid Valve Regurgi* 05/14/2023 29     LA volume (mod) 05/14/2023 67.60     Diaz's Biplane MOD Ej* 05/14/2023 4     Troponin-I 05/13/2023 0.047 (H)     RBC, UA 05/13/2023 <5     WBC, UA 05/13/2023 <5     Squamous Epithelial Cell* 05/13/2023 8 (H)     Bacteria, UA 05/13/2023 None Seen     Troponin-I 05/13/2023 0.043     Troponin-I 05/14/2023 0.047 (H)     Sodium Level 05/14/2023 142     Potassium Level 05/14/2023 4.4     Chloride 05/14/2023 109 (H)     Carbon Dioxide 05/14/2023 20 (L)     Glucose Level 05/14/2023 82     Blood Urea Nitrogen 05/14/2023 62.6 (H)     Creatinine 05/14/2023 4.96 (H)     Calcium Level Total 05/14/2023 8.3 (L)     Protein Total 05/14/2023 5.7 (L)     Albumin Level 05/14/2023 2.8 (L)     Globulin 05/14/2023 2.9     Albumin/Globulin Ratio 05/14/2023 1.0 (L)     Bilirubin Total 05/14/2023 0.3     Alkaline Phosphatase 05/14/2023 83     Alanine Aminotransferase 05/14/2023 16     Aspartate Aminotransfera* 05/14/2023 25     eGFR 05/14/2023 9     Folate Level 05/14/2023 19.2     Sed Rate 05/14/2023 63 (H)     C-Reactive Protein 05/14/2023 11.00 (H)     WBC 05/14/2023 9.95     RBC 05/14/2023 3.02 (L)     Hgb 05/14/2023 9.1 (L)     Hct 05/14/2023 29.6 (L)     MCV 05/14/2023 98.0 (H)     MCH 05/14/2023 30.1     MCHC 05/14/2023 30.7 (L)     RDW 05/14/2023 17.1 (H)     Platelet 05/14/2023 268     MPV 05/14/2023 9.5     Neut % 05/14/2023 53.4     Lymph % 05/14/2023 33.6     Mono % 05/14/2023  8.4     Eos % 05/14/2023 2.6     Basophil % 05/14/2023 0.6     Lymph # 05/14/2023 3.34     Neut # 05/14/2023 5.31     Mono # 05/14/2023 0.84     Eos # 05/14/2023 0.26     Baso # 05/14/2023 0.06     IG# 05/14/2023 0.14 (H)     IG% 05/14/2023 1.4     NRBC% 05/14/2023 0.0     Hepatitis B Surface Anti* 05/13/2023 Nonreactive     Sodium Level 05/15/2023 140     Potassium Level 05/15/2023 4.1     Chloride 05/15/2023 106     Carbon Dioxide 05/15/2023 25     Glucose Level 05/15/2023 89     Blood Urea Nitrogen 05/15/2023 26.5 (H)     Creatinine 05/15/2023 2.75 (H)     Calcium Level Total 05/15/2023 8.2 (L)     Albumin Level 05/15/2023 2.6 (L)     Phosphorus Level 05/15/2023 3.0     eGFR 05/15/2023 17     Magnesium Level 05/15/2023 1.90     WBC 05/15/2023 10.01     RBC 05/15/2023 2.91 (L)     Hgb 05/15/2023 8.7 (L)     Hct 05/15/2023 28.4 (L)     MCV 05/15/2023 97.6 (H)     MCH 05/15/2023 29.9     MCHC 05/15/2023 30.6 (L)     RDW 05/15/2023 17.0     Platelet 05/15/2023 273     MPV 05/15/2023 9.9     Neut % 05/15/2023 62.4     Lymph % 05/15/2023 24.5     Mono % 05/15/2023 9.9     Eos % 05/15/2023 1.8     Basophil % 05/15/2023 0.5     Lymph # 05/15/2023 2.45     Neut # 05/15/2023 6.25     Mono # 05/15/2023 0.99     Eos # 05/15/2023 0.18     Baso # 05/15/2023 0.05     IG# 05/15/2023 0.09 (H)     IG% 05/15/2023 0.9     NRBC% 05/15/2023 0.0         Microbiology Results (last 7 days)       ** No results found for the last 168 hours. **             X-Ray Chest 1 View    Result Date: 5/13/2023  EXAMINATION: XR CHEST 1 VIEW CLINICAL HISTORY: Weakness TECHNIQUE: Single frontal view of the chest was performed. COMPARISON: 06/17/2022 FINDINGS: LINES AND TUBES: There is a left brachial stent. MEDIASTINUM AND TATUM: The cardiac silhouette is normal. Aortic atherosclerosis. LUNGS: Linear opacities at the left lung base, likely atelectasis. PLEURA:No pleural effusion. No pneumothorax. OTHER: No acute osseous abnormality.     Probable left  basilar atelectasis. Electronically signed by: Deepti Zapata Date:    05/13/2023 Time:    12:41    Echo    Result Date: 5/14/2023  · Eccentric hypertrophy and mildly decreased systolic function. · Mild pulmonic regurgitation. · Mild tricuspid regurgitation. · Moderate mitral regurgitation. · Normal right ventricular size with normal right ventricular systolic function. · The estimated ejection fraction is 40%. · Grade I left ventricular diastolic dysfunction.    - pulls last radiology orders        Medication List        CONTINUE taking these medications      allopurinoL 100 MG tablet  Commonly known as: ZYLOPRIM     amitriptyline 25 MG tablet  Commonly known as: ELAVIL     amLODIPine 10 MG tablet  Commonly known as: NORVASC     atorvastatin 40 MG tablet  Commonly known as: LIPITOR     calcitRIOL 0.5 MCG Cap  Commonly known as: ROCALTROL     clopidogreL 75 mg tablet  Commonly known as: PLAVIX     DIALYVITE 3000 3-70-15 mg-mcg-mg Tab  Generic drug: folic acid-B [id number]     docusate sodium 50 MG capsule  Commonly known as: COLACE     furosemide 20 MG tablet  Commonly known as: LASIX     hydrALAZINE 50 MG tablet  Commonly known as: APRESOLINE     HYDROcodone-acetaminophen 5-325 mg per tablet  Commonly known as: NORCO  Take 1 tablet by mouth every 6 (six) hours as needed for Pain.     magnesium oxide 400 mg (241.3 mg magnesium) tablet  Commonly known as: MAG-OX     metoprolol succinate 50 MG 24 hr tablet  Commonly known as: TOPROL-XL     pantoprazole 40 MG tablet  Commonly known as: PROTONIX     sevelamer carbonate 800 mg Tab  Commonly known as: RENVELA     sodium bicarbonate 650 MG tablet     valsartan 160 MG tablet  Commonly known as: DIOVAN               Explained in detail to the patient about the discharge plan, medications, and follow-up visits. Pt understands and agrees with the treatment plan  Discharged Condition: stable  Diet: renal  Disposition: home    Medications Per MD med rec  Activities as  tolerated  Follow up with your PCP in 2 wks   For further questions contact hospitalist office    Discharge time 33 minutes    For worsening symptoms, chest pain, shortness of breath, increased abdominal pain, high grade fever, stroke or stroke like symptoms, immediately go to the nearest Emergency Room or call 911 as soon as possible.      Roger Diez M.D, on 5/15/2023. at 7:15 AM.

## 2023-05-15 NOTE — PLAN OF CARE
Problem: Adult Inpatient Plan of Care  Goal: Plan of Care Review  5/15/2023 0743 by Yayo Rodríguez RN  Outcome: Met  5/15/2023 0721 by Yayo Rodríguez RN  Outcome: Ongoing, Progressing  Goal: Patient-Specific Goal (Individualized)  5/15/2023 0743 by Yayo Rodríguez RN  Outcome: Met  5/15/2023 0721 by Yayo Rodríguez RN  Outcome: Ongoing, Progressing  Goal: Absence of Hospital-Acquired Illness or Injury  5/15/2023 0743 by Yayo Rodríguez RN  Outcome: Met  5/15/2023 0721 by Yayo Rodríguez RN  Outcome: Ongoing, Progressing  Goal: Optimal Comfort and Wellbeing  5/15/2023 0743 by Yayo Rodríguez RN  Outcome: Met  5/15/2023 0721 by Yayo Rodríguez RN  Outcome: Ongoing, Progressing  Goal: Readiness for Transition of Care  5/15/2023 0743 by Yayo Rodríguez RN  Outcome: Met  5/15/2023 0721 by Yayo Rodríguez RN  Outcome: Ongoing, Progressing     Problem: Device-Related Complication Risk (Hemodialysis)  Goal: Safe, Effective Therapy Delivery  5/15/2023 0743 by Yayo Rodríguez RN  Outcome: Met  5/15/2023 0721 by Yayo Rodríguez RN  Outcome: Ongoing, Progressing     Problem: Hemodynamic Instability (Hemodialysis)  Goal: Effective Tissue Perfusion  5/15/2023 0743 by Yayo Rodríguez RN  Outcome: Met  5/15/2023 0721 by Yayo Rodríguez RN  Outcome: Ongoing, Progressing     Problem: Infection (Hemodialysis)  Goal: Absence of Infection Signs and Symptoms  5/15/2023 0743 by Yayo Rodríguez RN  Outcome: Met  5/15/2023 0721 by Yayo Rodríguez RN  Outcome: Ongoing, Progressing

## 2023-05-15 NOTE — PLAN OF CARE
WILD incomplete. Patient admitted over the weekend to OBS and unable to complete MOON prior to discharge on Monday. Still awaiting insurance notification per registration.

## 2023-05-30 NOTE — DISCHARGE INSTRUCTIONS
-NO driving and NO alcohol consumption for 24 hours and while taking narcotic pain medications.    -Keep sites clean and dry for 2 days. Ok to shower afterwards. Do not submerge sites under water.    -NO heavy lifting. Do not lift objects greater than 20lbs. Use caution when bending, pulling, pushing, lifting.    -Monitor sites for infection: redness, swelling, drainage/pus/foul odor, fever, chills.    -Report to your nearest ER if you experience and/or notify your provider if you experience any SUDDEN/SEVERE chest/abdominal pain, weakness, trouble breathing, uncontrolled pain.    Activity - no heavy lifting >20lbs x 4 weeks   Wounds - may shower in 48hr. NO tub baths or soaking. After gently cleansing with soap and water, pat dressings dry. Allow steri-strips to fall off on their own. We will remove them at your post-op visit.     Diet - resume   Follow up in 2 weeks   May use PD cath

## 2023-05-30 NOTE — ANESTHESIA PREPROCEDURE EVALUATION
05/30/2023  Deepti Zambrano is a 78 y.o., female presents for diagnostic laparotomy. Hx of previous  abdominal PD catheter  with adhesions, and present catheter is now non functional. She has been hemodyalized via left arm fistula.    Other Medical History   Hypertension Stroke   Hx of bladder cancer Chronic kidney disease, unspecified   Kidney cancer, primary, with metastasis from kidney to other site, right GERD (gastroesophageal reflux disease)   Anticoagulant long-term use COPD (chronic obstructive pulmonary disease)   COVID-19 Gout   Sciatic nerve pain Dialysis patient   Anesthesia complication SOB (shortness of breath) on exertion   Muscle weakness of extremity Lower abdominal pain   Post-COVID chronic cough      Surgical History    HYSTERECTOMY TONSILLECTOMY   CHOLECYSTECTOMY right kidney removed   PERITONEAL CATHETER INSERTION AV FISTULA PLACEMENT   CATARACT EXTRACTION W/  INTRAOCULAR LENS IMPLANT NECK SURGERY   BACK SURGERY CORONARY ANGIOPLASTY WITH STENT PLACEMENT   DIAGNOSTIC LAPAROSCOPY COLONOSCOPY       Pre-op Assessment    I have reviewed the Patient Summary Reports.     I have reviewed the Nursing Notes. I have reviewed the NPO Status.   I have reviewed the Medications.     Review of Systems  Anesthesia Hx:  No problems with previous Anesthesia    Social:  Smoker    Cardiovascular:   Hypertension CABG/stent    Pulmonary:   COPD Shortness of breath    Renal/:   Chronic Renal Disease, ESRD, Dialysis    Hepatic/GI:   GERD    Neurological:   CVA, residual symptoms Neuromuscular Disease,        Physical Exam  General: Well nourished, Cooperative, Alert and Oriented    Airway:  Mallampati: II   Mouth Opening: Normal  TM Distance: Normal  Tongue: Normal  Neck ROM: Normal ROM    Dental:  Dentures    Chest/Lungs:  Clear to auscultation, Normal Respiratory Rate    Heart:  Rate: Normal  Rhythm:  Regular Rhythm  Sounds: Normal    Abdomen:  Normal, Soft, Nontender    Echo 5/23:   Eccentric hypertrophy and mildly decreased systolic function.   Mild pulmonic regurgitation.   Mild tricuspid regurgitation.   Moderate mitral regurgitation.   Normal right ventricular size with normal right ventricular systolic function.   The estimated ejection fraction is 40%.   Grade I left ventricular diastolic dysfunction.         Anesthesia Plan  Type of Anesthesia, risks & benefits discussed:    Anesthesia Type: Gen ETT  Intra-op Monitoring Plan: Standard ASA Monitors  Post Op Pain Control Plan: multimodal analgesia  Induction:  IV  Airway Plan: Direct  Informed Consent: Informed consent signed with the Patient and all parties understand the risks and agree with anesthesia plan.  All questions answered.   ASA Score: 4  Day of Surgery Review of History & Physical: H&P Update referred to the surgeon/provider.    Ready For Surgery From Anesthesia Perspective.     .

## 2023-05-30 NOTE — DISCHARGE SUMMARY
[facility] - Periop Services  Discharge Note  Short Stay    Procedure(s) (LRB):  LAPAROSCOPY, DIAGNOSTIC (N/A)      OUTCOME: Patient tolerated treatment/procedure well without complication and is now ready for discharge.    DISPOSITION: Home or Self Care    FINAL DIAGNOSIS:  Peritoneal dialysis catheter dysfunction    FOLLOWUP: In clinic    DISCHARGE INSTRUCTIONS:  No discharge procedures on file.      Clinical Reference Documents Added to Patient Instructions         Document    LAPAROSCOPY (ENGLISH)            TIME SPENT ON DISCHARGE:    minutes

## 2023-05-30 NOTE — OP NOTE
Patient:  Deepti Zambrano         :  1945          Date of Surgery:  2023           SURGEON:  Edmund Echols MD             ASSISTANT:  Maria Isabel Price NP            PREOPERATIVE DIAGNOSIS:         1. Chronic Renal Failure                                                                     2.  Peritoneal Catheter dysfunction            POSTOPERATIVE DIAGNOSIS:  Same.             OPERATIONS:  Laparoscopic  Exploration with Peritoneal Dialysis Catheter evaluation         Anesthesia:  General endotracheal anesthesia        Estimated blood loss:  minimal          Blood administered:  None        Lap and instrument counts correct x 2 at the end of the case.               INDICATIONS/SIGNIFICANT HISTORY:  The patient is a 78 y.o. year old female who was referred for evaluation of peritoneal dialysis catheter dysfunction.   Risks and Benefits of surgery was discussed with the patient, who voiced understanding of risks and benefits and elected to proceed with surgery.             PROCEDURE IN DETAIL:  Once informed consents were obtained, the patient was taken to the operating room and placed supine on the operating table.  After general endotracheal anesthesia was induced, the abdomen was prepped and draped in a standard surgical fashion.  A 5mm incision was made in the Left upper and a Visiport trocar was used to enter the abdominal cavity.  Pneumoperitoneum was created with insufflation.  After adequate insufflation, the 5mm scope was used to visualized the intra-abdominal compartment.  The catheter was visualized and appeared to be surrounded by a peritoneal adhesion which was taken down with sharp dissection.   The catheter was then replaced back into the pelvis in an area devoid of adhesions.  Of note, the patient appeared to have increased adhesions compared to the prior surgical procedure.  No bleeding was appreciated.  The scope and port was then removed along with insufflation.  The skin was  closed with a 4-0 Vicryl suture in an interrupted fashion.  The patient tolerated the procedure well and was transported to recovery room in good condition.

## 2023-05-30 NOTE — BRIEF OP NOTE
Brief Operative Note    Date of Procedure: 05/30/2023     Procedure:   Diagnostic laparoscopy with repositioning of peritoneal dialysis catheter     Surgeon(s) and Role:  Staff: Dr. Echols   Resident(s): Teri Rodrigues MD PGY4    Pre-Operative Diagnosis:   ESRD   Malfunction of PD catheter     Post-Operative Diagnosis: Same    Anesthesia: GETA    Operative Findings: Dense adhesions throughout the entire peritoneal cavity. PD catheter was repositioned in the pelvis     Description of Technical Procedures: Refer to full dictated operative report    Estimated Blood Loss (EBL): minimal            Implants: PD catheter     Drains: None    Specimens: None    Complications: None            Condition: Good     Disposition: Home

## 2023-05-30 NOTE — TRANSFER OF CARE
"Anesthesia Transfer of Care Note    Patient: Deepti Zambrano    Procedure(s) Performed: Procedure(s) (LRB):  LAPAROSCOPY, DIAGNOSTIC (N/A)    Patient location: PACU    Anesthesia Type: general    Transport from OR: Transported from OR on room air with adequate spontaneous ventilation    Post pain: adequate analgesia    Post assessment: no apparent anesthetic complications    Post vital signs: stable    Level of consciousness: sedated    Nausea/Vomiting: no nausea/vomiting    Complications: none    Transfer of care protocol was followed      Last vitals:   Visit Vitals  BP (!) 165/66   Pulse 75   Temp 36.7 °C (98 °F) (Oral)   Resp 17   Ht 5' 1" (1.549 m)   Wt 59.4 kg (130 lb 15.3 oz)   SpO2 99%   Breastfeeding No   BMI 24.74 kg/m²     "

## 2023-05-30 NOTE — ANESTHESIA POSTPROCEDURE EVALUATION
Anesthesia Post Evaluation    Patient: Deepti Zambrano    Procedure(s) Performed: Procedure(s) (LRB):  LAPAROSCOPY, DIAGNOSTIC (N/A)    Final Anesthesia Type: general      Patient location during evaluation: PACU  Level of consciousness: awake and alert  Post-procedure vital signs: reviewed and stable  Pain management: adequate  Airway patency: patent  THUY mitigation strategies: Multimodal analgesia  PONV status at discharge: No PONV  Anesthetic complications: no      Cardiovascular status: hemodynamically stable  Respiratory status: unassisted  Hydration status: euvolemic  Follow-up not needed.          Vitals Value Taken Time   /45 05/30/23 0943   Temp 36.8 05/30/23 1128   Pulse 62 05/30/23 0943   Resp 14 05/30/23 0905   SpO2 92 % 05/30/23 0943   Vitals shown include unvalidated device data.      Event Time   Out of Recovery 09:05:41         Pain/Caroline Score: Pain Rating Prior to Med Admin: 8 (5/30/2023  8:55 AM)  Caroline Score: 10 (5/30/2023  9:59 AM)  Modified Caroline Score: 20 (5/30/2023  9:59 AM)

## 2023-05-30 NOTE — ANESTHESIA PROCEDURE NOTES
Intubation    Date/Time: 5/30/2023 7:27 AM  Performed by: Jason Lawson CRNA  Authorized by: Addy Velez DO     Intubation:     Induction:  Intravenous    Intubated:  Postinduction    Mask Ventilation:  Easy mask    Attempts:  1    Attempted By:  CRNA    Method of Intubation:  Direct    Blade:  Good 2    Laryngeal View Grade: Grade I - full view of cords      Difficult Airway Encountered?: No      Complications:  None    Airway Device:  Oral endotracheal tube    Airway Device Size:  7.0    Style/Cuff Inflation:  Cuffed (inflated to minimal occlusive pressure)    Inflation Amount (mL):  9    Tube secured:  22    Secured at:  The lips    Placement Verified By:  Capnometry    Complicating Factors:  None    Findings Post-Intubation:  BS equal bilateral and atraumatic/condition of teeth unchanged

## 2023-08-24 NOTE — ED PROVIDER NOTES
Encounter Date: 8/24/2023       History     Chief Complaint   Patient presents with    Rectal Bleeding     Black stools and generalized weakness x 2 months that is progressively getting worse. Pt is on plavix. Pt Is a dialysis pt and receives dialysis Tuesday and Saturdays. Last full treatment on Tuesday.     Weakness     78-year-old female with a history of ESRD on HD (T, Sat), COPD, GERD, s/p CVA on anticoagulation, and hypertension presents to the ED with worsening dark black stools that began 2-1/2 months ago.  Patient states that when it 1st started, it was intermittent and then would clear up for 3-4 days.  Notes that it started again a few days ago and says the stools are very dark, giles than previous episodes.  Notes worsening generalized weakness. States unable to walk from one point to the next without having to sit down.  Patient is on Plavix.  Denies any abdominal pain, nausea, vomiting, fever, chills.  Notes that her last colonoscopy was 10 years ago, states she called GI yesterday to schedule another however was told to come to the ER for evaluation. Of note, she states she is also scheduled to have old port removed from abdomen     The history is provided by the patient. No  was used.     Review of patient's allergies indicates:   Allergen Reactions    Ciprofloxacin Itching    Codeine Itching    Penicillins Itching     Past Medical History:   Diagnosis Date    Anesthesia complication     trouble awaking    Anticoagulant long-term use     Chronic kidney disease, unspecified     COPD (chronic obstructive pulmonary disease)     COVID-19     week of Mothers Day 2023    Dialysis patient     once per week    GERD (gastroesophageal reflux disease)     Gout     Hx of bladder cancer     Hypertension     Kidney cancer, primary, with metastasis from kidney to other site, right     Lower abdominal pain     Muscle weakness of extremity     bilateral lower    Post-COVID chronic cough      Sciatic nerve pain     SOB (shortness of breath) on exertion     Stroke     1997, small memory loss with left sided weakness     Past Surgical History:   Procedure Laterality Date    AV FISTULA PLACEMENT Left     BACK SURGERY      CATARACT EXTRACTION W/  INTRAOCULAR LENS IMPLANT Bilateral     CHOLECYSTECTOMY      COLONOSCOPY      CORONARY ANGIOPLASTY WITH STENT PLACEMENT      Dr. Strickland 18 years ago    DIAGNOSTIC LAPAROSCOPY N/A 07/28/2022    Procedure: LAPAROSCOPY, DIAGNOSTIC;  Surgeon: Edmund Echols Jr., MD;  Location: [facility] OR;  Service: General;  Laterality: N/A;    DIAGNOSTIC LAPAROSCOPY N/A 5/30/2023    Procedure: LAPAROSCOPY, DIAGNOSTIC;  Surgeon: Edmund Echols Jr., MD;  Location: [facility] OR;  Service: General;  Laterality: N/A;    HYSTERECTOMY      LAPAROSCOPIC REVISION OF PROCEDURE INVOLVING PERITONEAL DIALYSIS CATHETER  5/30/2023    Procedure: REVISION OF PROCEDURE INVOLVING PERITONEAL DIALYSIS CATHETER, LAPAROSCOPIC;  Surgeon: Edmund Echols Jr., MD;  Location: [facility] OR;  Service: General;;    NECK SURGERY      PERITONEAL CATHETER INSERTION      right kidney removed Right     TONSILLECTOMY       History reviewed. No pertinent family history.  Social History     Tobacco Use    Smoking status: Every Day     Current packs/day: 0.25     Types: Cigarettes, Vaping with nicotine    Smokeless tobacco: Never   Substance Use Topics    Alcohol use: Never    Drug use: Never     Review of Systems   Constitutional:  Negative for chills and fever.   Eyes:  Negative for visual disturbance.   Respiratory:  Negative for cough and shortness of breath.    Cardiovascular:  Negative for chest pain.   Gastrointestinal:  Positive for blood in stool. Negative for abdominal pain, diarrhea, nausea and vomiting.   Genitourinary:  Negative for dysuria.   Musculoskeletal:  Negative for arthralgias.   Skin:  Negative for color change and rash.   Neurological:  Positive for weakness. Negative for dizziness and headaches.    Psychiatric/Behavioral:  Negative for behavioral problems.    All other systems reviewed and are negative.      Physical Exam     Initial Vitals [08/24/23 1141]   BP Pulse Resp Temp SpO2   (!) 141/88 70 20 97.9 °F (36.6 °C) (!) 94 %      MAP       --         Physical Exam    Nursing note and vitals reviewed.  Constitutional: She appears well-developed and well-nourished.   HENT:   Head: Normocephalic and atraumatic.   Eyes: EOM are normal. Pupils are equal, round, and reactive to light.   Neck: Neck supple.   Cardiovascular:  Normal rate, regular rhythm and normal heart sounds.           Pulmonary/Chest: Breath sounds normal.   Abdominal: Abdomen is soft. Bowel sounds are normal. She exhibits no distension. There is no abdominal tenderness.   Has port to abdomen; no tenderness or erythema noted to site  There is no rebound.   Genitourinary: Rectum:      Guaiac result positive.      No anal fissure, tenderness, external hemorrhoid or internal hemorrhoid.   Guaiac positive stool. : Acceptable.  Musculoskeletal:         General: Normal range of motion.      Cervical back: Neck supple.     Neurological: She is alert and oriented to person, place, and time. She has normal strength. GCS score is 15. GCS eye subscore is 4. GCS verbal subscore is 5. GCS motor subscore is 6.   Skin: Skin is warm and dry.   Psychiatric: She has a normal mood and affect.         ED Course   Procedures  Labs Reviewed   COMPREHENSIVE METABOLIC PANEL - Abnormal; Notable for the following components:       Result Value    Blood Urea Nitrogen 39.4 (*)     Creatinine 4.13 (*)     All other components within normal limits   TROPONIN I - Abnormal; Notable for the following components:    Troponin-I 0.053 (*)     All other components within normal limits   CBC WITH DIFFERENTIAL - Abnormal; Notable for the following components:    RBC 3.78 (*)     Hgb 11.9 (*)     MCV 98.1 (*)     MCH 31.5 (*)     MCHC 32.1 (*)     All other  components within normal limits   APTT - Normal   PROTIME-INR - Normal   CBC W/ AUTO DIFFERENTIAL    Narrative:     The following orders were created for panel order CBC auto differential.  Procedure                               Abnormality         Status                     ---------                               -----------         ------                     CBC with Differential[[id number]]        Abnormal            Final result                 Please view results for these tests on the individual orders.   HEMOGLOBIN AND HEMATOCRIT, BLOOD   TYPE & SCREEN   ABORH RETYPE     EKG Readings: (Independently Interpreted)   Initial Reading: No STEMI. Previous EKG: Compared with most recent EKG Previous EKG Date: 5/13/23. Rhythm: Normal Sinus Rhythm. Heart Rate: 65. Conduction: Normal. ST Segments: Normal ST Segments. ST Segment Depression: II, III and AVF. T Waves: Normal. Axis: Normal.   ST depression noted in inferior leads, however was also present on EKG on 5/13/23     ECG Results              EKG 12-lead (Final result)  Result time 08/24/23 20:58:29      Final result by Interface, Lab In [facility] (08/24/23 20:58:29)                   Narrative:    Test Reason : R53.1,    Vent. Rate : 065 BPM     Atrial Rate : 065 BPM     P-R Int : 160 ms          QRS Dur : 090 ms      QT Int : 414 ms       P-R-T Axes : 068 065 097 degrees     QTc Int : 430 ms    Normal sinus rhythm  Nonspecific ST abnormality  Abnormal ECG  When compared with ECG of 13-MAY-2023 12:21,  No significant change was found  Confirmed by Wild Cisneros MD ([id number]) on 8/24/2023 8:58:20 PM    Referred By:             Confirmed By:Wild Cisneros MD                                  Imaging Results              CT Abdomen Pelvis  Without Contrast (Final result)  Result time 08/24/23 18:49:10      Final result by Stuart Vázquez MD (08/24/23 18:49:10)                   Impression:      1. Gastric lumen  hyperdensities may represent ingested contents though some  hemorrhage could have similar appearance.    2. Overall limited assessment of the colon due to lack of contrast and colonic fecal loading.  Noninflamed diverticulosis coli extensively involve the distal descending and sigmoid colon.  Please correlate patient is up-to-date on colonoscopy..      Electronically signed by: Stuart Vázquez  Date:    08/24/2023  Time:    18:49               Narrative:    EXAMINATION:  CT ABDOMEN PELVIS WITHOUT CONTRAST    CLINICAL HISTORY:  GI bleed;    TECHNIQUE:  Multidetector axial images were obtained from the  diaphragms to below symphysis pubis without the administration of IV contrast. Oral contrast was not administered.    Dose length product of 230 mGycm. Automated exposure control was utilized to minimize radiation dose.    COMPARISON:  None available    FINDINGS:  Included lungs some chronic interstitial changes of lungs without, acute air space infiltrates.  Trace of right pleural effusion right basilar atelectasis..    Within limitations of noncontrast technique, no acute findings of the liver, pancreas and spleen identified. Gallbladder is surgically absent no apparent biliary dilation.    The adrenal glands noncontrast evaluation is unremarkable.  Right kidney is not present.  Left kidney cortical volume is adequate.  Left kidney upper pole small cortical calculi.  There is no left hydronephrosis or perinephric strandings.  There is also no left hydroureter.  Calcified plaques of the abdominal aorta and iliac arteries.  Calcified plaques also involve the celiac trunk and the superior mesenteric artery and distally bilateral renal arteries.    Stomach is mostly decompressed.  There are some hyperdensities within the gastric lumen which may be related to ingested content though some hemorrhagic component is without exclusion.  No pericardiac inflammatory strandings.  Small-bowel loops are without dilatation.  Appendix is not visualized.  Limited assessment of the colon due to  fecal content especially the cecum ascending colon which also show some mural prominence could be secondary to decompression.  Noninflamed diverticulosis coli involves the descending and the sigmoid colon.  No bowel obstruction.  No free air or free fluid.    Urinary bladder appears within normal limits. No intravesical stone identified. There is no pelvic free fluid.    There is Schmorl node defect along the superior endplate of L4.  No acute or aggressive skeletal abnormality.                                       Medications   pantoprazole injection 40 mg (has no administration in time range)   pantoprazole injection 40 mg (has no administration in time range)   sodium chloride 0.9% flush 10 mL (has no administration in time range)   trazodone split tablet 25 mg (has no administration in time range)   ondansetron injection 4 mg (has no administration in time range)   prochlorperazine injection Soln 5 mg (has no administration in time range)   polyethylene glycol packet 17 g (has no administration in time range)   senna-docusate 8.6-50 mg per tablet 1 tablet (has no administration in time range)   bisacodyL suppository 10 mg (has no administration in time range)   acetaminophen tablet 650 mg (has no administration in time range)   aluminum-magnesium hydroxide-simethicone 200-200-20 mg/5 mL suspension 30 mL (has no administration in time range)   acetaminophen tablet 650 mg (has no administration in time range)   pantoprazole injection 40 mg (40 mg Intravenous Given 8/24/23 2001)   sodium chloride 0.9% bolus 250 mL 250 mL (0 mLs Intravenous Stopped 8/24/23 2101)     Medical Decision Making  Differential diagnosis: GI bleed, anemia, electrolyte abn, ACS, dehydration     78-year-old female presents to the ED with increasingly dark stools over the last 2 months.  States the most recent episode started a few days ago, and has been extremely weak since.  Labs somewhat benign, noting stable H& H.  Creatinine elevated,  "which is, for patient.  Troponin also noted to be elevated however this is baseline for patient due to yesterday.  Patient has no chest pain or shortness of breath, or other symptoms concerning for ACS.  Repeat troponin was not ordered.  On exam, patient had no abdominal tenderness however was guaiac positive on rectal exam.  Stool to be extremely dark in nature.  CT noting possible hemorrhages in the gastric lumen consistent with a GI bleed.  Protonix was given.  GI was consulted, who recommended making patient NPO after midnight, give Protonix and will likely colonoscopy the patient tomorrow.  Admit to HM.    Amount and/or Complexity of Data Reviewed  Labs:  Decision-making details documented in ED Course.  Radiology: ordered.    Risk  Prescription drug management.  Decision regarding hospitalization.               ED Course as of 08/24/23 2143   Thu Aug 24, 2023   1354 Troponin I(!): 0.053  Patient denies any chest pain, likely from history of ESRD; baseline when compared to previous [MA]   1645 GI paged  [MA]   1700 GI re-paged  [MA]   1810 GI re-paged for 3rd time  [MA]   1840 GI paged for 4th time  [MA]   1857 CT scan showing signs of questionable GI hemorrhage; in light of progressively worsening symptoms, will admit to HM and consult GI; have been paging GI for 2+ hours with no call-back [MA]   1910 Spoke to Dr Hauser with GI, agrees with admission for scope tomorrow, add protonix and make patient Npo after midnight  [MA]   1959 Spoke to Tiny with , agrees with admission  [MA]      ED Course User Index  [MA] Shahzad Sinclair, ANETTE          BP (!) 156/49   Pulse 80   Temp 97.9 °F (36.6 °C) (Oral)   Resp 18   Ht 5' 1" (1.549 m)   Wt 60.3 kg (133 lb)   SpO2 99%   BMI 25.13 kg/m²             Clinical Impression:   Final diagnoses:  [R53.1] Weakness  [K92.2] Gastrointestinal hemorrhage, unspecified gastrointestinal hemorrhage type (Primary)        ED Disposition Condition    Admit Stable           "      Shahzad Sinclair PA-C  08/24/23 5777

## 2023-08-24 NOTE — FIRST PROVIDER EVALUATION
Medical screening examination initiated.  I have conducted a focused provider triage encounter, findings are as follows:    Brief history of present illness:  78-year-old female presents to ED for evaluation of blood in stool worsening since yesterday.  Patient reports having black stools.  Reports to having generalized weakness. Currently on Plavix. States having symptoms intermittent for the past 2 months.     There were no vitals filed for this visit.    Pertinent physical exam:  Patient is awake and alert and oriented.  Ambulatory to triage.  In no acute distress.      Brief workup plan:  labs, PT/PTT, type and screen, IV    Preliminary workup initiated; this workup will be continued and followed by the physician or advanced practice provider that is assigned to the patient when roomed.

## 2023-08-25 PROBLEM — K92.2 GASTROINTESTINAL HEMORRHAGE: Status: ACTIVE | Noted: 2023-01-01

## 2023-08-25 NOTE — PLAN OF CARE
08/25/23 1056   Discharge Assessment   Assessment Type Discharge Planning Assessment   Confirmed/corrected address, phone number and insurance Yes   Confirmed Demographics Correct on Facesheet   Source of Information patient   Communicated SNOW with patient/caregiver Date not available/Unable to determine   Reason For Admission GI Bleed   People in Home alone   Do you expect to return to your current living situation? Yes   Do you have help at home or someone to help you manage your care at home? No   Prior to hospitilization cognitive status: Alert/Oriented   Current cognitive status: Alert/Oriented   Home Accessibility wheelchair accessible;other (see comments)  (bathroom has bars)   Home Layout Able to live on 1st floor   Equipment Currently Used at Home none   Readmission within 30 days? No   Patient currently being followed by outpatient case management? No   Do you currently have service(s) that help you manage your care at home? No   Do you take prescription medications? Yes   Do you have prescription coverage? Yes   Coverage [employer]   Do you have any problems affording any of your prescribed medications? No   Is the patient taking medications as prescribed? yes   Who is going to help you get home at discharge? presley Francine Franky    How do you get to doctors appointments? car, drives self   Are you on dialysis? Yes   Dialysis Name and Scheduled days T&S DCI @ 6:45   Do you take coumadin? Yes   Who monitors your labs? clinic   DME Needed Upon Discharge  none   Discharge Plan discussed with: Patient   Transition of Care Barriers None   Discharge Plan A Home   Discharge Plan B Home

## 2023-08-25 NOTE — PROVATION PATIENT INSTRUCTIONS
Discharge Summary/Instructions after an Endoscopic Procedure  Patient Name: Deepti Zambrano  Patient MRN: [id number]  Patient YOB: 1945 Friday, August 25, 2023  Elan Leigh MD  Dear patient,  As a result of recent federal legislation (The Federal Cures Act), you may   receive lab or pathology results from your procedure in your [facility]   account before your physician is able to contact you. Your physician or   their representative will relay the results to you with their   recommendations at their soonest availability.  Thank you,  RESTRICTIONS:  During your procedure today, you received medications for sedation.  These   medications may affect your judgment, balance and coordination.  Therefore,   for 24 hours, you have the following restrictions:   - DO NOT drive a car, operate machinery, make legal/financial decisions,   sign important papers or drink alcohol.    ACTIVITY:  Today: no heavy lifting, straining or running due to procedural   sedation/anesthesia.  The following day: return to full activity including work.  DIET:  Eat and drink normally unless instructed otherwise.     TREATMENT FOR COMMON SIDE EFFECTS:  - Mild abdominal pain, nausea, belching, bloating or excessive gas:  rest,   eat lightly and use a heating pad.  - Sore Throat: treat with throat lozenges and/or gargle with warm salt   water.  - Because air was used during the procedure, expelling large amounts of air   from your rectum or belching is normal.  - If a bowel prep was taken, you may not have a bowel movement for 1-3 days.    This is normal.  SYMPTOMS TO WATCH FOR AND REPORT TO YOUR PHYSICIAN:  1. Abdominal pain or bloating, other than gas cramps.  2. Chest pain.  3. Back pain.  4. Signs of infection such as: chills or fever occurring within 24 hours   after the procedure.  5. Rectal bleeding, which would show as bright red, maroon, or black stools.   (A tablespoon of blood from the rectum is not serious,  especially if   hemorrhoids are present.)  6. Vomiting.  7. Weakness or dizziness.  GO DIRECTLY TO THE NEAREST EMERGENCY ROOM IF YOU HAVE ANY OF THE FOLLOWING:      Difficulty breathing              Chills and/or fever over 101 F   Persistent vomiting and/or vomiting blood   Severe abdominal pain   Severe chest pain   Black, tarry stools   Bleeding- more than one tablespoon   Any other symptom or condition that you feel may need urgent attention  Your doctor recommends these additional instructions:  If any biopsies were taken, your doctors clinic will contact you in 1 to 2   weeks with any results.  - Return patient to hospital cash for ongoing care.   - Anemia chronic and stable at this point  - no further GI workup planned in inpatient setting  - Can follow up with Dr Biggs  - Resume diet  - Call if needed over the weekend.  For questions, problems or results please call your physician - Elan Leigh MD at Work:  [phone].  [facility] [place], EMERGENCY ROOM PHONE NUMBER: [phone]  IF A COMPLICATION OR EMERGENCY SITUATION ARISES AND YOU ARE UNABLE TO REACH   YOUR PHYSICIAN - GO DIRECTLY TO THE EMERGENCY ROOM.  Elan Leigh MD  8/25/2023 2:47:10 PM  This report has been verified and signed electronically.  Dear patient,  As a result of recent federal legislation (The Federal Cures Act), you may   receive lab or pathology results from your procedure in your [facility]   account before your physician is able to contact you. Your physician or   their representative will relay the results to you with their   recommendations at their soonest availability.  Thank you,  PROVATION

## 2023-08-25 NOTE — H&P
[facility] Hospital Medicine History & Physical Examination       Patient Name: Deepti Zambrano  MRN: [id number]  Patient Class: IP- Inpatient   Admission Date: 08/25/2023   Admitting Service: Hospital Medicine   Length of Stay: 1  Attending Physician: Arnold Vanegas MD  Primary Care Provider: Saleem Juan MD  Face-to-Face encounter date: 08/25/2023  Code Status: Full  Chief Complaint: Rectal Bleeding (Black stools and generalized weakness x 2 months that is progressively getting worse. Pt is on plavix. Pt Is a dialysis pt and receives dialysis Tuesday and Saturdays. Last full treatment on Tuesday. ) and Weakness      Patient information was obtained from patient, patient's family, past medical records and ER records.      HISTORY OF PRESENT ILLNESS:   eDepti Zambrano is a 78 y.o. female with a PMHx of HTN, COPD, history of CVA with residual left-sided weakness, history of bladder cancer, GERD, gout, ESRD on HD who presented to [facility] on 8/24/2023 with c/o worsening black stools and generalized weakness over the past 2-1/2 months.  She was so weak she was unable to walk long distances without having to sit down to take a break.  Last colonoscopy times 10 years ago.  She is on Plavix. Denied CP or SOB, abdominal pain, hematemesis, nausea or vomiting.    Initial ED VS include /80, HR 70, RR 20, SpO2 94% on room air, temperature 97.9° F.  Labs were notable for hemoglobin 11.3, hematocrit 35.3, BUN 39.4, creatinine 4.13, troponin 0.053. EKG demonstrated NSR. CT abdomen and pelvis without contrast demonstrated gastric lumen hyperdensities may represent ingested contents though some hemorrhage could have similar appearance; overall limited assessment of the colon due to lack of contrast and colonic fecal loading, noninflamed diverticulosis coli extensively involved the distal descending and sigmoid colon.  Stool was guaiac positive.  She was given IV Protonix in the  ED. GI Services were consulted in ED. Admitted to hospital medicine services for further medical management.    REVIEW OF SYSTEMS:   Except as documented, all other systems reviewed and negative     PAST MEDICAL HISTORY:   Essential hypertension   COPD   History of CVA with residual left-sided weakness  History of bladder cancer  GERD   Gout   ESRD on HD Tuesday and Saturday  History of COVID-19 on 05/20/2023     PAST SURGICAL HISTORY:     Past Surgical History:   Procedure Laterality Date    AV FISTULA PLACEMENT Left     BACK SURGERY      CATARACT EXTRACTION W/  INTRAOCULAR LENS IMPLANT Bilateral     CHOLECYSTECTOMY      COLONOSCOPY      CORONARY ANGIOPLASTY WITH STENT PLACEMENT      Dr. Strickland 18 years ago    DIAGNOSTIC LAPAROSCOPY N/A 07/28/2022    Procedure: LAPAROSCOPY, DIAGNOSTIC;  Surgeon: Edmund Echols Jr., MD;  Location: [facility] OR;  Service: General;  Laterality: N/A;    DIAGNOSTIC LAPAROSCOPY N/A 5/30/2023    Procedure: LAPAROSCOPY, DIAGNOSTIC;  Surgeon: Edmund Echols Jr., MD;  Location: [facility] OR;  Service: General;  Laterality: N/A;    HYSTERECTOMY      LAPAROSCOPIC REVISION OF PROCEDURE INVOLVING PERITONEAL DIALYSIS CATHETER  5/30/2023    Procedure: REVISION OF PROCEDURE INVOLVING PERITONEAL DIALYSIS CATHETER, LAPAROSCOPIC;  Surgeon: Edmund Echols Jr., MD;  Location: [facility] OR;  Service: General;;    NECK SURGERY      PERITONEAL CATHETER INSERTION      right kidney removed Right     TONSILLECTOMY         FAMILY HISTORY:   Reviewed and negative    SOCIAL HISTORY:   Denied alcohol, tobacco or illicit drug use    ALLERGIES:   Ciprofloxacin, Codeine, and Penicillins    HOME MEDICATIONS:     Prior to Admission medications    Medication Sig Start Date End Date Taking? Authorizing Provider   allopurinoL (ZYLOPRIM) 100 MG tablet Take 100 mg by mouth nightly. 5/25/22  Yes Provider, Historical   amitriptyline (ELAVIL) 25 MG tablet Take 25 mg by mouth every evening. 5/9/22  Yes Provider, Historical   amLODIPine  (NORVASC) 10 MG tablet Take 10 mg by mouth nightly. 5/25/22  Yes Provider, Historical   atorvastatin (LIPITOR) 40 MG tablet Take 40 mg by mouth every evening. 5/25/22  Yes Provider, Historical   calcitRIOL (ROCALTROL) 0.5 MCG Cap Take 0.5 mcg by mouth every Mon, Wed, Fri.   Yes Provider, Historical   clopidogreL (PLAVIX) 75 mg tablet Take 75 mg by mouth once daily. 5/25/22  Yes Provider, Historical   docusate sodium (COLACE) 50 MG capsule Take 150 mg by mouth every evening.   Yes Provider, Historical   folic acid-B hzie-H-zqyej-zinc (DIALYVITE 3000) 3-70-15 mg-mcg-mg Tab Take by mouth once daily.   Yes Provider, Historical   furosemide (LASIX) 80 MG tablet Take 40 mg by mouth once daily. 5/9/22  Yes Provider, Historical   hydrALAZINE (APRESOLINE) 50 MG tablet Take 50 mg by mouth every evening.   Yes Provider, Historical   metoprolol succinate (TOPROL-XL) 50 MG 24 hr tablet Take 50 mg by mouth once daily. 5/7/22  Yes Provider, Historical   pantoprazole (PROTONIX) 40 MG tablet Take 40 mg by mouth nightly. 5/25/22  Yes Provider, Historical   sodium bicarbonate 650 MG tablet Take 650 mg by mouth every evening.   Yes Provider, Historical   traZODone (DESYREL) 50 MG tablet Take 50 mg by mouth every evening.   Yes Provider, Historical   valsartan (DIOVAN) 160 MG tablet Take 160 mg by mouth once daily.   Yes Provider, Historical   magnesium oxide (MAG-OX) 400 mg (241.3 mg magnesium) tablet Take 400 mg by mouth nightly.    Provider, Historical   traMADoL (ULTRAM) 50 mg tablet Take 1 tablet (50 mg total) by mouth every 6 (six) hours as needed for Pain. 5/30/23 8/25/23  Maria Isabel Quintanilla, GILLIAN     ________________________________________________________________________  INPATIENT LIST OF MEDICATIONS     Current Facility-Administered Medications:     acetaminophen tablet 650 mg, 650 mg, Oral, Q4H PRN, Tiny Valladares, FNP    acetaminophen tablet 650 mg, 650 mg, Oral, Q4H PRN, Tiny Valladares, FNP    aluminum-magnesium  hydroxide-simethicone 200-200-20 mg/5 mL suspension 30 mL, 30 mL, Oral, Q4H PRN, Tiny Valladares, BELKIS    bisacodyL suppository 10 mg, 10 mg, Rectal, Daily PRN, Tiny Valladares, BELKIS    ondansetron injection 4 mg, 4 mg, Intravenous, Q4H PRN, Tiny Valladares, GILLIANP    pantoprazole injection 40 mg, 40 mg, Intravenous, Once, Tiny Valladares, GILLIANP    pantoprazole injection 40 mg, 40 mg, Intravenous, BID, Tiny Valladares, GILLIANP    polyethylene glycol packet 17 g, 17 g, Oral, BID PRN, Tiny Valladares, GILLIANP    prochlorperazine injection Soln 5 mg, 5 mg, Intravenous, Q6H PRN, Tiny Valladares, BELKIS    senna-docusate 8.6-50 mg per tablet 1 tablet, 1 tablet, Oral, BID PRN, Tiny Valladares, BELKIS    sodium chloride 0.9% flush 10 mL, 10 mL, Intravenous, PRN, Tiny Valladares, GILLIANP    trazodone split tablet 25 mg, 25 mg, Oral, Nightly PRN, Tiny Valladares, BELKIS    Scheduled Meds:   pantoprazole  40 mg Intravenous Once    pantoprazole  40 mg Intravenous BID     Continuous Infusions:  PRN Meds:.acetaminophen, acetaminophen, aluminum-magnesium hydroxide-simethicone, bisacodyL, ondansetron, polyethylene glycol, prochlorperazine, senna-docusate 8.6-50 mg, sodium chloride 0.9%, trazodone    PHYSICAL EXAM:     VITAL SIGNS: 24 HRS MIN & MAX LAST   Temp  Min: 97.9 °F (36.6 °C)  Max: 98 °F (36.7 °C) 97.9 °F (36.6 °C)   BP  Min: 141/57  Max: 167/58 (!) 150/63   Pulse  Min: 67  Max: 81  67   Resp  Min: 11  Max: 20 20   SpO2  Min: 94 %  Max: 99 % 97 %       General appearance:  Chronically ill-appearing female in no apparent distress.  HENT: Atraumatic head. Moist mucous membranes of oral cavity.  Eyes: Normal extraocular movements.   Neck: Supple.   Lungs: Clear to auscultation bilaterally.   Heart: Regular rate and rhythm. S1 and S2 present. No pedal edema.  Abdomen: Soft, non-distended, non-tender.  Extremities: No cyanosis, clubbing  Skin:  Pallor  Neuro: Motor and sensory exams grossly intact.   Psych/mental status: Appropriate mood and affect. Responds  appropriately to questions.     LABS AND IMAGING:     Recent Labs   Lab 08/24/23  1258 08/24/23  2119 08/25/23  0543   WBC 9.95  --  9.73   RBC 3.78*  --  3.43*   HGB 11.9* 11.3* 10.9*   HCT 37.1 35.3* 33.7*   MCV 98.1*  --  98.3*   MCH 31.5*  --  31.8*   MCHC 32.1*  --  32.3*   RDW 15.7  --  15.5     --  204   MPV 10.0  --  10.1       Recent Labs   Lab 08/24/23  1258 08/25/23  0539    143   K 4.4 4.4   CO2 26 22*   BUN 39.4* 43.2*   CREATININE 4.13* 4.37*   CALCIUM 9.9 9.0   MG  --  2.20   ALBUMIN 3.6 3.2*   ALKPHOS 76 70   ALT 14 13   AST 21 19   BILITOT 0.4 0.4       Microbiology Results (last 7 days)       ** No results found for the last 168 hours. **             CT Abdomen Pelvis  Without Contrast  Narrative: EXAMINATION:  CT ABDOMEN PELVIS WITHOUT CONTRAST    CLINICAL HISTORY:  GI bleed;    TECHNIQUE:  Multidetector axial images were obtained from the  diaphragms to below symphysis pubis without the administration of IV contrast. Oral contrast was not administered.    Dose length product of 230 mGycm. Automated exposure control was utilized to minimize radiation dose.    COMPARISON:  None available    FINDINGS:  Included lungs some chronic interstitial changes of lungs without, acute air space infiltrates.  Trace of right pleural effusion right basilar atelectasis..    Within limitations of noncontrast technique, no acute findings of the liver, pancreas and spleen identified. Gallbladder is surgically absent no apparent biliary dilation.    The adrenal glands noncontrast evaluation is unremarkable.  Right kidney is not present.  Left kidney cortical volume is adequate.  Left kidney upper pole small cortical calculi.  There is no left hydronephrosis or perinephric strandings.  There is also no left hydroureter.  Calcified plaques of the abdominal aorta and iliac arteries.  Calcified plaques also involve the celiac trunk and the superior mesenteric artery and distally bilateral renal  arteries.    Stomach is mostly decompressed.  There are some hyperdensities within the gastric lumen which may be related to ingested content though some hemorrhagic component is without exclusion.  No pericardiac inflammatory strandings.  Small-bowel loops are without dilatation.  Appendix is not visualized.  Limited assessment of the colon due to fecal content especially the cecum ascending colon which also show some mural prominence could be secondary to decompression.  Noninflamed diverticulosis coli involves the descending and the sigmoid colon.  No bowel obstruction.  No free air or free fluid.    Urinary bladder appears within normal limits. No intravesical stone identified. There is no pelvic free fluid.    There is Schmorl node defect along the superior endplate of L4.  No acute or aggressive skeletal abnormality.  Impression: 1. Gastric lumen  hyperdensities may represent ingested contents though some hemorrhage could have similar appearance.    2. Overall limited assessment of the colon due to lack of contrast and colonic fecal loading.  Noninflamed diverticulosis coli extensively involve the distal descending and sigmoid colon.  Please correlate patient is up-to-date on colonoscopy..    Electronically signed by: Stuart Vázquez  Date:    08/24/2023  Time:    18:49        ASSESSMENT & PLAN:     Acute GI bleed, suspected upper GI bleed   Acute blood loss anemia   Elevated Troponin, likely NSTEMI Type II 2/2 above  ESRD on HD Tuesday and Saturday   Essential hypertension  COPD not in exacerbation   History of CVA with residual left-sided weakness  History of bladder cancer, GERD, gout    Plan:  GI Services consulted, appreciate recommendations  NPO  IV PPI b.i.d.   Nephrology also consulted for inpatient HD  Resume appropriate home medications once able to take p.o. intake   Labs in a.m.      VTE Prophylaxis: SCDs    Discharge Planning and Disposition: Deysi HOLDEN, YESSI have reviewed and discussed  the case with Arnold Keita MD  Please see the attending MD's addendum for further assessment and plan.    Deysi Wallace, [facility]  08/25/2023    _______________________________________________________________________________  MD Addendum:  I, Dr. Arnold keita , assumed care of this patient today   For the patient encounter, I performed the substantive portion of the visit, I reviewed the NP/PA documentation, treatment plan, and medical decision making.  I had face to face time with this patient     A. History:   78 y.o. female with a PMHx of HTN, COPD, history of CVA with residual left-sided weakness, history of bladder cancer, GERD, gout, ESRD on HD who presented to [facility] on 8/24/2023 with c/o worsening black stools and generalized weakness over the past 2-1/2 months.  She was so weak she was unable to walk long distances without having to sit down to take a break.  Last colonoscopy times 10 years ago.  She is on Plavix.  She was given IV Protonix in the ED. GI Services were consulted in ED.    Admitted to hospital medicine services for further medical management.    Initial ED VS   /80, HR 70, RR 20, SpO2 94% on room air, temperature 97.9° F.      Labs   hemoglobin 11.3, hematocrit 35.3, BUN 39.4, creatinine 4.13, troponin 0.053.   Stool was guaiac positive.     Imaging  EKG demonstrated NSR.     CT abdomen and pelvis without contrast demonstrated gastric lumen hyperdensities may represent ingested contents though some hemorrhage could have similar appearance;  noninflamed diverticulosis coli extensively involved the distal descending and sigmoid colon.        Physical exam:  General appearance:  Chronically ill-appearing female in no apparent distress.  HENT: Atraumatic head. Moist mucous membranes of oral cavity.  Lungs: Clear to auscultation bilaterally.   Heart: Regular rate and rhythm. S1 and S2 present. No pedal edema.  Abdomen: Soft, non-distended, non-tender.  Extremities: No cyanosis,  clubbing  Skin:  Pallor  Neuro: Motor and sensory exams grossly intact.   Psych/mental status: Appropriate mood and affect. Responds appropriately to questions.          ASSESSMENT & PLAN:      Acute GI bleed, suspected upper GI bleed   Acute blood loss anemia   Elevated Troponin, likely NSTEMI Type II 2/2 above  ESRD on HD Tuesday and Saturday   Essential hypertension  COPD not in exacerbation   History of CVA with residual left-sided weakness  History of bladder cancer, GERD, gout     Plan:  Admit inpatient   Cardiac monitoring   GI Services consulted, appreciate recommendations  NPO  IV PPI b.i.d.   Trend hgb levels  Nephrology also consulted for inpatient HD  Trend troponin till peak and down trending   BP control  Resume appropriate home medications once able to take p.o. intake   Labs in a.m.        VTE Prophylaxis: SCDs     Discharge Planning and Disposition: TBD      All diagnosis and differential diagnosis have been reviewed; assessment and plan has been documented; I have personally reviewed the labs and test results that are presently available; I have reviewed the patients medication list; I have reviewed the consulting providers response and recommendations. I have reviewed or attempted to review medical records based upon their availability.    All of the patient and family questions have been addressed and answered. Patient's is agreeable to the above stated plan. I will continue to monitor closely and make adjustments to medical management as needed.      08/25/2023

## 2023-08-25 NOTE — TRANSFER OF CARE
"Anesthesia Transfer of Care Note    Patient: Deepti Zambrano    Procedure(s) Performed: Procedure(s) (LRB):  EGD (N/A)    Patient location: GI    Anesthesia Type: MAC    Transport from OR: Transported from OR on room air with adequate spontaneous ventilation    Post pain: adequate analgesia    Post assessment: no apparent anesthetic complications and tolerated procedure well    Post vital signs: stable    Level of consciousness: awake and responds to stimulation    Complications: none    Transfer of care protocol was followed      Last vitals:   Visit Vitals  BP (!) 164/74 (BP Location: Right arm, Patient Position: Lying)   Pulse 72   Temp 36.4 °C (97.5 °F) (Temporal)   Resp 18   Ht 5' 1" (1.549 m)   Wt 60.3 kg (132 lb 15 oz)   SpO2 95%   Breastfeeding No   BMI 25.12 kg/m²     "

## 2023-08-25 NOTE — ANESTHESIA PREPROCEDURE EVALUATION
78-year-old female, consulted for ESRD (HD Q Tuesday and Saturday at [facility]) presented to the hospital on 08/24/2023 after she told her GI doctor that she was having black stools.  Patient reports a chronic GI bleed issue.  She also reports weight loss due to lack of appetite.  She had increased weakness, noticed her stools were black, and was directed to the emergency room by her gastroenterologist.  She was admitted last night and is awaiting EGD today.  Regarding her dialysis, she was initially on PD but had several issues with it and has since converted to hemodialysis.  She has been awaiting removal of her PD catheter with Dr. Echols.                                                                                                           08/25/2023  Deepti Zambrano is a 78 y.o., female.  Procedure Information    Case: [id number] Date/Time: 08/25/23 1415   Procedure: EGD (Abdomen)   Anesthesia type: General/MAC   Diagnosis:        Weakness [R53.1]       Acute blood loss anemia [D62]       Melena [K92.1]   Location: [facility] ENDO 02 / [facility] ENDOSCOPY   Surgeons: Elan Leigh MD         Pre-op Assessment    I have reviewed the Patient Summary Reports.     I have reviewed the Nursing Notes. I have reviewed the NPO Status.   I have reviewed the Medications.     Review of Systems  Anesthesia Hx:  No problems with previous Anesthesia    Hematology/Oncology:  Hematology Normal   Oncology Normal     EENT/Dental:EENT/Dental Normal   Cardiovascular:   Exercise tolerance: poor Hypertension BOWDEN  Functional Capacity 2 METS  Hypertension    Pulmonary:   COPD    Renal/:   Denies Chronic Renal Disease.     Hepatic/GI:   GERD    Musculoskeletal:  Musculoskeletal Normal    Neurological:   CVA Neuromuscular Disease,    Endocrine:  Endocrine Normal  Denies Morbid Obesity / BMI > 40  Dermatological:  Skin Normal    Psych:  Psychiatric Normal           Physical Exam  General: Alert, Oriented, Well  nourished and Cooperative    Airway:  Mallampati: II   Mouth Opening: Normal  TM Distance: Normal  Tongue: Normal  Neck ROM: Normal ROM    Dental:  Intact    Chest/Lungs:  Clear to auscultation, Normal Respiratory Rate    Heart:  Rate: Normal  Rhythm: Regular Rhythm       Latest Reference Range & Units Most Recent   WBC 4.50 - 11.50 x10(3)/mcL 9.73  8/25/23 05:43   RBC 4.20 - 5.40 x10(6)/mcL 3.43 (L)  8/25/23 05:43   Hemoglobin 12.0 - 16.0 g/dL 10.9 (L)  8/25/23 05:43   Hematocrit 37.0 - 47.0 % 33.7 (L)  8/25/23 05:43   MCV 80.0 - 94.0 fL 98.3 (H)  8/25/23 05:43   MCH 27.0 - 31.0 pg 31.8 (H)  8/25/23 05:43   MCHC 33.0 - 36.0 g/dL 32.3 (L)  8/25/23 05:43   RDW 11.5 - 17.0 % 15.5  8/25/23 05:43   Platelets 130 - 400 x10(3)/mcL 204  8/25/23 05:43   MPV 7.4 - 10.4 fL 10.1  8/25/23 05:43   Neut % % 56.6  8/25/23 05:43   LYMPH % % 27.5  8/25/23 05:43   Mono % % 8.6  8/25/23 05:43   Eosinophil % % 5.8  8/25/23 05:43   Basophil % % 1.1  8/25/23 05:43   Immature Granulocytes % 0.4  8/25/23 05:43   Gran # (ANC) 2.10 - 9.20 x10(3)/mcL 6.64  10/19/21 15:55   Neut # 2.1 - 9.2 x10(3)/mcL 5.50  8/25/23 05:43   Lymph # 0.6 - 4.6 x10(3)/mcL 2.68  8/25/23 05:43   Mono # 0.1 - 1.3 x10(3)/mcL 0.84  8/25/23 05:43   Eos # 0 - 0.9 x10(3)/mcL 0.56  8/25/23 05:43   Baso # <=0.2 x10(3)/mcL 0.11  8/25/23 05:43   Immature Grans (Abs) 0 - 0.04 x10(3)/mcL 0.04  8/25/23 05:43   nRBC % 0.0  8/25/23 05:43   Iron 50 - 170 ug/dL 34 (L)  10/19/21 04:41   TIBC 250 - 450 ug/dL 166 (L)  10/19/21 04:41   Iron Binding Capacity Unsaturated 70 - 310 ug/dL 132  10/19/21 04:41   Transferrin 173 - 360 mg/dL 147 (L)  10/19/21 04:41   Ferritin 4.63 - 204.00 ng/mL 157.19  10/19/21 04:41   Folate 7.0 - 31.4 ng/mL 19.2  5/14/23 03:44   Vitamin B-12 213 - 816 pg/mL 966 (H)  10/19/21 04:41   Iron Saturation 20 - 50 % 20  10/19/21 04:41   Sed Rate 0 - 20 mm/hr 63 (H)  5/14/23 03:44   Protime 12.5 - 14.5 seconds 13.5  8/24/23 12:58   INR <=1.3  1.0  8/24/23 12:58   aPTT  23.2 - 33.7 seconds 32.9  8/24/23 12:58   Sodium 136 - 145 mmol/L 143  8/25/23 05:39   Potassium 3.5 - 5.1 mmol/L 4.4  8/25/23 05:39   Chloride 98 - 107 mmol/L 110 (H)  8/25/23 05:39   CO2 23 - 31 mmol/L 22 (L)  8/25/23 05:39   Anion Gap mEq/L 13.0  5/30/23 07:12   BUN 9.8 - 20.1 mg/dL 43.2 (H)  8/25/23 05:39   Creatinine 0.55 - 1.02 mg/dL 4.37 (H)  8/25/23 05:39   BUN/CREAT RATIO  13  5/30/23 07:12   eGFR mls/min/1.73/m2 10  8/25/23 05:39   eGFR if non African American mls/min/1.73/m2 10  6/17/22 10:05   eGFR if African American  11  3/17/22 09:01   Glucose 82 - 115 mg/dL 90  8/25/23 05:39   Calcium 8.4 - 10.2 mg/dL 9.0  8/25/23 05:39   Phosphorus 2.3 - 4.7 mg/dL 4.8 (H)  8/25/23 05:39   Magnesium 1.60 - 2.60 mg/dL 2.20  8/25/23 05:39   Alkaline Phosphatase 40 - 150 unit/L 70  8/25/23 05:39   PROTEIN TOTAL 5.8 - 7.6 gm/dL 5.8  8/25/23 05:39   Albumin 3.4 - 4.8 g/dL 3.2 (L)  8/25/23 05:39   Albumin/Globulin Ratio 1.1 - 2.0 ratio 1.2  8/25/23 05:39   Uric Acid 2.7 - 6.0 mg/dL 6.0  5/28/20 06:53   BILIRUBIN TOTAL <=1.5 mg/dL 0.4  8/25/23 05:39   Bilirubin Direct 0.0 - 0.5  0.2  3/17/22 09:01   Bilirubin, Indirect 0.00 - 0.80  0.20  3/17/22 09:01   AST 5 - 34 unit/L 19  8/25/23 05:39   ALT 0 - 55 unit/L 13  8/25/23 05:39   Ammonia 18.0 - 72.0 umol/L 19.3  10/11/21 10:38   CRP <5.00 mg/L 11.00 (H)  5/14/23 03:44   Globulin, Total 2.4 - 3.5 gm/dL 2.6  8/25/23 05:39   BNP <<=109.0 pg/mL 58.2  5/31/20 05:37   CPK 29 - 168 U/L 72  5/28/20 06:53   CPK MB 0.5 - 3.6 ng/mL 1.7  11/26/19 11:02    - 271 unit/L 247  5/28/20 06:53   Troponin I 0.000 - 0.045 ng/mL 0.053 (H)  8/24/23 12:58   Lactate, Zhou 0.5 - 2.2 mmol/L 0.7  5/30/20 15:07   Thyroid Stimulating Hormone 0.3500 - 4.9400 uIU/mL 2.7004  5/26/20 10:14   T4 Total 4.70 - 13.30 mcg/dL 9.30  3/22/18 10:47   Free Thyroxine Index 2.60 - 3.60  3.44  3/22/18 10:47   PTH 18.4 - 80.1 pg/mL 96.2 (H)  3/22/18 10:47   T3 (Triiodothyronine), Uptake, S 31.0 - 39.0 % 37.0  3/22/18  10:47   Vancomycin, Random 15.0 - 20.0 ug/ml 26.2 (H)  10/18/21 04:44   Benzodiazepine Screen, Urine >Negative  Positive !  10/10/21 11:55   Phencyclidine >Negative  Negative  10/10/21 11:55   Cocaine (Metab.) >Negative  Negative  10/10/21 11:55   Opiate Scrn, Ur >Negative  Positive !  10/10/21 11:55   Barbiturate Screen, Ur >Negative  Negative  10/10/21 11:55   Amphetamine Screen, Ur >Negative  Negative  10/10/21 11:55   Fentanyl, Urine >Negative  Negative  10/10/21 11:55   Cannabinoids, Urine >Negative  Negative  10/10/21 11:55   MDMA, Urine >Negative  Negative  10/10/21 11:55   Group & Rh  A POS  8/24/23 13:14   Indirect Avinash GEL  NEG  8/24/23 13:14   Specimen Outdate  08/27/2023 23:59  8/24/23 13:14   Hep A IgM >Nonreactive  Nonreactive  10/10/21 04:17   Hep B C IgM >Nonreactive  Nonreactive  10/10/21 04:17   Hep B S Ab >Nonreactive  Nonreactive  10/7/21 14:14   Hepatitis B Surface Antibody Qnt mIU/mL 0.11  10/7/21 14:14   Hepatitis B Surface Antigen Nonreactive  Nonreactive  5/13/23 22:33   Hepatitis C Ab >Nonreactive  Nonreactive  10/10/21 04:17   Hepatitis Panel Interp  No serological evidence of recent or past hepatitis A, B, or C infection.      Radha Galindo M.D.    10/10/21 04:17   Influenza A Ag, EIA >Negative  Negative  2/18/18 19:42   Influenza A, Molecular >Negative  Negative  11/26/19 01:34   Influenza B Ag, EIA >Negative  Negative  2/18/18 19:42   Influenza B, Molecular >Negative  Negative  11/26/19 01:34   SARS-CoV2 (COVID-19) Qualitative PCR >Not Detected  Not Detected  6/1/20 13:10   Color, UA Yellow, Light-Yellow, Dark Yellow, Rita, Straw  Yellow  5/13/23 17:31   Appearance, UA Clear  Clear  5/13/23 17:31   Specific Gravity,UA 1.005 - 1.030  1.012  5/13/23 17:31   pH, UA 5.0 - 8.5  5.5  5/13/23 17:31   Protein, UA Negative mg/dL 3+ !  5/13/23 17:31   Glucose, UA Negative, Normal mg/dL Negative  5/13/23 17:31   Ketones, UA Negative mg/dL Negative  5/13/23 17:31   Occult Blood UA Negative  unit/L Negative  5/13/23 17:31   NITRITE UA Negative  Negative  5/13/23 17:31   UROBILINOGEN UA  0.2  10/9/21 11:55   Bilirubin (UA) >Negative  Negative  10/9/21 11:55   Bilirubin, UA Negative mg/dL Negative  5/13/23 17:31   Urobilinogen, UA 0.2, 1.0, Normal mg/dL 0.2  5/13/23 17:31   Leukocytes, UA Negative unit/L Negative  5/13/23 17:31   RBC, UA <=5 /HPF <5  5/13/23 17:31   WBC, UA <=5 /HPF <5  5/13/23 17:31   Bacteria, UA None Seen, Rare, Occasional /HPF None Seen  5/13/23 17:31   Squam Epithel, UA <=5 /HPF 8 (H)  5/13/23 17:31   Sodium, Urine mmol/L 58.0  5/28/20 21:30   BLOOD CULTURE OLG  Rpt  10/9/21 18:08   CULTURE, URINE  Rpt  11/29/19 14:55   POC PH 7.350 - 7.450  7.360  10/11/21 18:00   POC PCO2 35.0 - 45.0 mmHg 36.0  10/11/21 18:00   POC PO2 80.0 - 100.0 mmHg 118.0 (H)  10/11/21 18:00   POC HCO3 22.0 - 26.0 mmol/L 20.3 (L)  10/11/21 18:00   Sodium, Blood Gas 136 - 145 mmol/L 138  7/28/22 08:34   Potassium, Blood Gas 3.5 - 5.1 mmol/L 5.6 (H)  7/28/22 08:34   POC SATURATED O2 96.0 - 97.0 % 98.5 (H)  10/11/21 18:00   POC Chloride 95 - 110 mmol/L 108  7/28/22 08:34   POC Anion Gap 8 - 16 mmol/L 14  7/28/22 08:34   POC BUN 6 - 30 mg/dL 98 (H)  7/28/22 08:34   POC TREATMENT  oxymask 10l  10/11/21 18:00   Sample  VENOUS  7/28/22 08:34   POC CO2 22.0 - 27.0 mmol/L 21.4 (L)  10/11/21 18:00   POC TCO2 24.0 - 29.0 mmol/L 25.0  12/1/19 06:14   O2 HGB, Arterial 94.0 - 97.0 % 98.2 (H)  10/11/21 18:00   POC CO HGB % 000.7  10/11/21 18:00   POC Ionized Calcium 1.06 - 1.42 mmol/L 1.12  7/28/22 08:34   POC MET HGB 0.4 - 1.5 % 0.5  5/30/20 18:01   POC CAO2 15.7 - 21.6 mL/dL 13.5 (L)  10/11/21 18:00   POC THb 12.0 - 16.0 gm/dL 9.6 (L)  10/11/21 18:00   POC Glucose 70 - 110 mg/dL 100  7/28/22 08:34   POC HEMOGLOBIN g/dL 11  7/28/22 08:34   POC Hematocrit 36 - 54 %PCV 31 (L)  7/28/22 08:34   POC BE -2.0 - 3.0  -4.6 (L)  10/11/21 18:00   POC TCO2 (MEASURED) 23 - 29 mmol/L 23  7/28/22 08:34   Site  Radial Rt  10/11/21 18:00    Sample Source  art  10/11/21 18:00   POC Creatinine 0.5 - 1.4 mg/dL 5.0 (H)  7/28/22 08:34   POC Troponin 0.00 - 0.08 ng/mL 0.01  5/26/20 10:24   CT ABDOMEN PELVIS WITHOUT CONTRAST  Rpt  8/24/23 18:27   CT CHEST WITHOUT CONTRAST  Rpt  3/23/22 09:43   CT HEAD WITHOUT CONTRAST  Rpt  5/30/20 08:06   CT LUMBAR SPINE WITHOUT CONTRAST  Rpt  10/9/21 15:15   CT THORACIC SPINE WITHOUT CONTRAST  Rpt  10/9/21 15:15   CTA CHEST NON CORONARY  Rpt  10/11/21 20:24   XR ABDOMEN AP 1 VIEW  Rpt  7/13/22 11:44   XR CHEST 1 VIEW  Rpt  5/13/23 12:35   XR CHEST PA AND LATERAL  Rpt  5/23/23 11:58   XR CHEST PA LATERAL WITH LORDOTIC VIEW  Rpt  3/17/22 09:13   XR LUMBAR SPINE 2 OR 3 VIEWS  Rpt  9/27/21 11:55   MRA BRAIN WITHOUT CONTRAST  Rpt  12/1/19 12:29   MRI BRAIN WITHOUT CONTRAST  Rpt  10/12/21 05:37   MRI LUMBAR SPINE WITHOUT CONTRAST  Rpt  11/29/19 23:24   US GUIDED NEEDLE PLACEMENT  Rpt  4/12/18 09:57   US THYROID  Rpt  3/22/18 11:25   EKG 12-LEAD  Rpt  8/24/23 11:42   ECHO (CUPID ONLY)  Rpt !  5/14/23 06:42   CV US DOPPLER ARTERIAL ARM LEFT (CUPID ONLY)  Rpt (E)  12/9/21 00:00   Allens Test  Positive  10/11/21 18:00   Ao peak teena m/s 1.75  5/14/23 06:42   Ao VTI cm 30.3  5/14/23 06:42   AV index (prosthetic)  0.66  5/14/23 06:42   AV peak gradient mmHg 12  5/14/23 06:42   CARDIAC MONITORING STRIPS  Rpt  5/15/23 07:15   CARDIOLOGY REPORT  Rpt  10/10/21 21:41   CT HEAD FOR STROKE  Rpt  10/10/21 09:46   Left Ventricle Relative Wall Thickness cm 0.33  5/14/23 06:42   CV US VEIN MAPPING BILATERAL  Rpt (E)  9/14/21 00:00   E/E' ratio m/s 15.85  5/14/23 06:42   EF % 40  5/14/23 06:42   HBs Antibody, Quantitative mIU/mL 33.3  5/14/23 10:42   HBs Antibody  Positive  5/14/23 10:42   INFLUENZA A AND B ANTIGEN  Rpt  2/18/18 19:42   LV mass g 186.53  5/14/23 06:42   Left Ventricular Outflow Tract Mean Gradient mmHg 2.00  5/14/23 06:42   Left Ventricular Outflow Tract Mean Velocity cm/s 0.71  5/14/23 06:42   Vitamin B12 Assay, S 180 - 914 ng/L  1175 (H)  5/14/23 03:44   Diaz's Biplane MOD Ejection Fraction % 4  5/14/23 06:42   POCT INFLUENZA A/B MOLECULAR  Rpt  11/26/19 01:34   SARS Coronavirus 2 Antigen >NEGATIVE  NEGATIVE  10/7/21 13:57   SARS CORONAVIRUS 2 ANTIGEN POCT, MANUAL READ  Rpt  10/7/21 13:57   SETTING 1 ABG  oxymask 10l  10/11/21 18:00   Specific Gravity Urine Automated 1.001 - 1.035  1.012  10/10/21 11:55   Triscuspid Valve Regurgitation Peak Gradient mmHg 29  5/14/23 06:42   (L): Data is abnormally low  (H): Data is abnormally high  !: Data is abnormal  Rpt: View report in Results Review for more information   (E): External lab resultEccentric hypertrophy and mildly decreased systolic function.   Mild pulmonic regurgitation.   Mild tricuspid regurgitation.   Moderate mitral regurgitation.   Normal right ventricular size with normal right ventricular systolic function.   The estimated ejection fraction is 40%.   Grade I left ventricular diastolic dysfunction.  Test Reason : R53.1,     Vent. Rate : 065 BPM     Atrial Rate : 065 BPM      P-R Int : 160 ms          QRS Dur : 090 ms       QT Int : 414 ms       P-R-T Axes : 068 065 097 degrees      QTc Int : 430 ms     Normal sinus rhythm   Nonspecific ST abnormality   Abnormal ECG   When compared with ECG of 13-MAY-2023 12:21,   No significant change was found   Confirmed by [employer] [facility] Boys[employer]CHNIQUE:  Single frontal view of the chest was performed.     COMPARISON:  06/17/2022     FINDINGS:  LINES AND TUBES: There is a left brachial stent.     MEDIASTINUM AND TATUM: The cardiac silhouette is normal. Aortic atherosclerosis.     LUNGS: Linear opacities at the left lung base, likely atelectasis.     PLEURA:No pleural effusion. No pneumothorax.     OTHER: No acute osseous abnormality.     Impression:     Probable left basilar atelectasis.        Electronically signed by: Wild Pelayo ([id number]) on 8/24/2023 8:58:20 PM     Anesthesia Plan  Type of Anesthesia, risks & benefits  discussed:    Anesthesia Type: Gen Natural Airway  Intra-op Monitoring Plan: Standard ASA Monitors  Post Op Pain Control Plan: multimodal analgesia  Induction:  IV  Airway Plan: Direct  Informed Consent: Informed consent signed with the Patient and all parties understand the risks and agree with anesthesia plan.  All questions answered. Patient consented to blood products? Yes  ASA Score: 3  Day of Surgery Review of History & Physical: H&P Update referred to the surgeon/provider.I have interviewed and examined the patient. I have reviewed the patient's H&P dated: There are no significant changes.     Ready For Surgery From Anesthesia Perspective.     .

## 2023-08-25 NOTE — CONSULTS
Nephrology  Consults  Chief Complaint   Patient presents with    Rectal Bleeding     Black stools and generalized weakness x 2 months that is progressively getting worse. Pt is on plavix. Pt Is a dialysis pt and receives dialysis Tuesday and Saturdays. Last full treatment on Tuesday.     Weakness     HPI:  78-year-old female, consulted for ESRD (HD Q Tuesday and Saturday at [facility]) presented to the hospital on 08/24/2023 after she told her GI doctor that she was having black stools.  Patient reports a chronic GI bleed issue.  She also reports weight loss due to lack of appetite.  She had increased weakness, noticed her stools were black, and was directed to the emergency room by her gastroenterologist.  She was admitted last night and is awaiting EGD today.  Regarding her dialysis, she was initially on PD but had several issues with it and has since converted to hemodialysis.  She has been awaiting removal of her PD catheter with Dr. Echols.     Review of Systems   All other systems negative except for HPI      Past Medical History:   Diagnosis Date    Anesthesia complication     trouble awaking    Anticoagulant long-term use     Chronic kidney disease, unspecified     COPD (chronic obstructive pulmonary disease)     COVID-19     week of Mothers Day 2023    Dialysis patient     once per week    GERD (gastroesophageal reflux disease)     Gout     Hx of bladder cancer     Hypertension     Kidney cancer, primary, with metastasis from kidney to other site, right     Lower abdominal pain     Muscle weakness of extremity     bilateral lower    Post-COVID chronic cough     Sciatic nerve pain     SOB (shortness of breath) on exertion     Stroke     1997, small memory loss with left sided weakness        Past Surgical History:   Procedure Laterality Date    AV FISTULA PLACEMENT Left     BACK SURGERY      CATARACT EXTRACTION W/  INTRAOCULAR LENS IMPLANT Bilateral     CHOLECYSTECTOMY      COLONOSCOPY      CORONARY  ANGIOPLASTY WITH STENT PLACEMENT      Dr. Strickland 18 years ago    DIAGNOSTIC LAPAROSCOPY N/A 07/28/2022    Procedure: LAPAROSCOPY, DIAGNOSTIC;  Surgeon: Edmund Echols Jr., MD;  Location: [facility] OR;  Service: General;  Laterality: N/A;    DIAGNOSTIC LAPAROSCOPY N/A 5/30/2023    Procedure: LAPAROSCOPY, DIAGNOSTIC;  Surgeon: Edmund Echols Jr., MD;  Location: [facility] OR;  Service: General;  Laterality: N/A;    HYSTERECTOMY      LAPAROSCOPIC REVISION OF PROCEDURE INVOLVING PERITONEAL DIALYSIS CATHETER  5/30/2023    Procedure: REVISION OF PROCEDURE INVOLVING PERITONEAL DIALYSIS CATHETER, LAPAROSCOPIC;  Surgeon: Edmund Echols Jr., MD;  Location: [facility] OR;  Service: General;;    NECK SURGERY      PERITONEAL CATHETER INSERTION      right kidney removed Right     TONSILLECTOMY        History reviewed. No pertinent family history.     Social History     Socioeconomic History    Marital status:    Tobacco Use    Smoking status: Every Day     Current packs/day: 0.25     Types: Cigarettes, Vaping with nicotine    Smokeless tobacco: Never   Substance and Sexual Activity    Alcohol use: Never    Drug use: Never    Sexual activity: Never        Review of patient's allergies indicates:   Allergen Reactions    Penicillins Itching and Hallucinations    Ciprofloxacin Itching    Codeine Itching     Current Outpatient Medications   Medication Instructions    allopurinoL (ZYLOPRIM) 100 mg, Oral, Nightly    amitriptyline (ELAVIL) 25 mg, Oral, Nightly    amLODIPine (NORVASC) 10 mg, Oral, Nightly    atorvastatin (LIPITOR) 40 mg, Oral, Nightly    calcitRIOL (ROCALTROL) 0.5 mcg, Oral, Every Mon, Wed, Fri    clopidogreL (PLAVIX) 75 mg, Oral, Daily    docusate sodium (COLACE) 150 mg, Oral, Nightly    folic acid-B psnd-X-yafij-zinc (DIALYVITE 3000) 3-70-15 mg-mcg-mg Tab Oral, Daily    furosemide (LASIX) 40 mg, Oral, Daily    hydrALAZINE (APRESOLINE) 50 mg, Oral, Nightly    magnesium oxide (MAG-OX) 400 mg, Oral, Nightly    metoprolol  succinate (TOPROL-XL) 50 mg, Oral, Daily    pantoprazole (PROTONIX) 40 mg, Oral, Nightly    sodium bicarbonate 650 mg, Oral, Nightly    traZODone (DESYREL) 50 mg, Oral, Nightly    valsartan (DIOVAN) 160 mg, Oral, Daily     Objective   VITAL SIGNS: 24 HR MIN & MAX LAST    Temp  Min: 97.9 °F (36.6 °C)  Max: 98 °F (36.7 °C)  98 °F (36.7 °C)        BP  Min: 133/57  Max: 167/58  (!) 133/57     Pulse  Min: 67  Max: 81  71     Resp  Min: 11  Max: 20  20    SpO2  Min: 94 %  Max: 99 %  95 %      Wt Readings from Last 3 Encounters:   08/25/23 60.3 kg (132 lb 15 oz)   05/30/23 59.4 kg (130 lb 15.3 oz)   05/14/23 60.8 kg (134 lb)       Intake/Output Summary (Last 24 hours) at 8/25/2023 1116  Last data filed at 8/24/2023 2101  Gross per 24 hour   Intake 250 ml   Output --   Net 250 ml     General:  Alert and oriented  Psychiatric:  Cooperative, Appropriate mood & affect.    Eye:  Within normal limits, Normal conjunctiva.    HENT:  Normocephalic, Oral mucosa is moist.    Respiratory: Bilaterally CTA, un-labored.    Cardiovascular:  Normal rate, Regular rhythm, No edema.    Gastrointestinal:  Soft, PD catheter in place.    Musculoskeletal:  Normal strength, No deformity.    Integumentary:  Warm, No rash.    Recent Labs     08/24/23  1258 08/25/23  0539    143   K 4.4 4.4   CHLORIDE 107 110*   CO2 26 22*   BUN 39.4* 43.2*   CREATININE 4.13* 4.37*   GLUCOSE 97 90   CALCIUM 9.9 9.0   MG  --  2.20   PHOS  --  4.8*   ALBUMIN 3.6 3.2*      Recent Labs     08/24/23  1258 08/24/23  2119 08/25/23  0543   WBC 9.95  --  9.73   HGB 11.9*   < > 10.9*     --  204    < > = values in this interval not displayed.     CT Abdomen Pelvis  Without Contrast   Final Result      1. Gastric lumen  hyperdensities may represent ingested contents though some hemorrhage could have similar appearance.      2. Overall limited assessment of the colon due to lack of contrast and colonic fecal loading.  Noninflamed diverticulosis coli extensively  involve the distal descending and sigmoid colon.  Please correlate patient is up-to-date on colonoscopy..         Electronically signed by: Stuart Vázquez   Date:    08/24/2023   Time:    18:49           ASSESSMENT PLAN    Suspected upper GI bleed    ESRD (HD Q T/S)  Anemia with GI bleed  HTN  Prior CVA (12/2019) with minimal left sided weakness  Metabolic bone disease  CAD with remote stenting.On Plavix  Carotid artery disease (right total occlusion, left 40-60% )  Right Kidney cancer with Mets, status post right nephrectomy  Bladder cancer  CVA/TIA    Continue HD on Tuesdays and Saturdays  She has significant residual urine output, no ultrafiltration needed  Going for EGD today  If she has significant GI bleeding then may need to ask Dr. Gordon about stopping Plavix.        Peng Avalos M.D.  Nephrology

## 2023-08-25 NOTE — NURSING
Nurses Note -- 4 Eyes      8/25/2023   12:10 AM      Skin assessed during: Admit      [x] No Altered Skin Integrity Present    []Prevention Measures Documented      [] Yes- Altered Skin Integrity Present or Discovered   [] LDA Added if Not in Epic (Describe Wound)   [] New Altered Skin Integrity was Present on Admit and Documented in LDA   [] Wound Image Taken    Wound Care Consulted? No    Attending Nurse:  Martha Hill RN/Staff Member:   Trisha Goodman RN

## 2023-08-25 NOTE — CONSULTS
Gastroenterology Consultation Note    Reason for Consult: Melena     PCP:   Saleem Juan MD      History of Present Illness (HPI):  78 year old known to Dr. Biggs with PMHx ESRD on HD (T, Sat), COPD, GERD, s/p CVA on Plavix, bladder cancer and HTN who presented to the ED with worsening dark stools for the past 2 months associated with weakness.     On arrival, patient hypertensive but otherwise stable with labs notable for H&H 11.3/35.3, elevated BUN/Cr, troponin 0.053. CT Abd Pelvis w/o contrast with gastric lumen hyperdensities that may represent ingested contents though some hemorrhage could have similar appearance. Overall limited assessment of the colon due to lack of contrast and colonic fecal loading    She reports dark black stools for the past 1-2 months becoming more frequent. Her most recent BM was yesteday descirbed to be black. She denies assocaited abdominal pain, N/V. She denies chronic NSAID use. Social alcohol use only.     Of note, PD catheter in place though has been using AV fistula with plans to soon remove peritoneal dialysis catheter. Followed by Dr. Echols     Most recent Colonoscopy 2018 with internal hemorrhoids, diverticulosis in sigmoid coon, and polypectomy x3- TA on path with recall in 3 years. No records of repeat being done due to inability to contact patient.   No prior EGD.     ROS:  Review of Systems   Constitutional:  Positive for malaise/fatigue. Negative for chills and fever.   HENT:  Negative for sore throat.    Respiratory:  Negative for shortness of breath and wheezing.    Gastrointestinal:  Positive for heartburn and melena. Negative for abdominal pain, nausea and vomiting.   Musculoskeletal:  Negative for falls.   Skin:  Negative for itching and rash.   Neurological:  Positive for weakness and headaches. Negative for seizures.   Psychiatric/Behavioral:  The patient is not nervous/anxious.        Medical History:   Past Medical History:   Diagnosis Date     Anesthesia complication     trouble awaking    Anticoagulant long-term use     Chronic kidney disease, unspecified     COPD (chronic obstructive pulmonary disease)     COVID-19     week of Mothers Day 2023    Dialysis patient     once per week    GERD (gastroesophageal reflux disease)     Gout     Hx of bladder cancer     Hypertension     Kidney cancer, primary, with metastasis from kidney to other site, right     Lower abdominal pain     Muscle weakness of extremity     bilateral lower    Post-COVID chronic cough     Sciatic nerve pain     SOB (shortness of breath) on exertion     Stroke     1997, small memory loss with left sided weakness       Surgical History:   Past Surgical History:   Procedure Laterality Date    AV FISTULA PLACEMENT Left     BACK SURGERY      CATARACT EXTRACTION W/  INTRAOCULAR LENS IMPLANT Bilateral     CHOLECYSTECTOMY      COLONOSCOPY      CORONARY ANGIOPLASTY WITH STENT PLACEMENT      Dr. Strickland 18 years ago    DIAGNOSTIC LAPAROSCOPY N/A 07/28/2022    Procedure: LAPAROSCOPY, DIAGNOSTIC;  Surgeon: Edmund Echols Jr., MD;  Location: [facility] OR;  Service: General;  Laterality: N/A;    DIAGNOSTIC LAPAROSCOPY N/A 5/30/2023    Procedure: LAPAROSCOPY, DIAGNOSTIC;  Surgeon: Edmund Echols Jr., MD;  Location: [facility] OR;  Service: General;  Laterality: N/A;    HYSTERECTOMY      LAPAROSCOPIC REVISION OF PROCEDURE INVOLVING PERITONEAL DIALYSIS CATHETER  5/30/2023    Procedure: REVISION OF PROCEDURE INVOLVING PERITONEAL DIALYSIS CATHETER, LAPAROSCOPIC;  Surgeon: Edmund Echols Jr., MD;  Location: [facility] OR;  Service: General;;    NECK SURGERY      PERITONEAL CATHETER INSERTION      right kidney removed Right     TONSILLECTOMY         Family History:   History reviewed. No pertinent family history..     Social History:   Social History     Tobacco Use    Smoking status: Every Day     Current packs/day: 0.25     Types: Cigarettes, Vaping with nicotine    Smokeless tobacco: Never   Substance Use Topics     "Alcohol use: Never       Allergies:  Review of patient's allergies indicates:   Allergen Reactions    Ciprofloxacin Itching    Codeine Itching    Penicillins Itching       Medications Prior to Admission   Medication Sig Dispense Refill Last Dose    allopurinoL (ZYLOPRIM) 100 MG tablet Take 100 mg by mouth nightly.   8/25/2023    amitriptyline (ELAVIL) 25 MG tablet Take 25 mg by mouth every evening.   8/25/2023    amLODIPine (NORVASC) 10 MG tablet Take 10 mg by mouth nightly.   8/25/2023    atorvastatin (LIPITOR) 40 MG tablet Take 40 mg by mouth every evening.   8/25/2023    calcitRIOL (ROCALTROL) 0.5 MCG Cap Take 0.5 mcg by mouth every Mon, Wed, Fri.   8/25/2023    clopidogreL (PLAVIX) 75 mg tablet Take 75 mg by mouth once daily.   8/25/2023    docusate sodium (COLACE) 50 MG capsule Take 150 mg by mouth every evening.   8/25/2023    folic acid-B fglq-B-ecqtv-zinc (DIALYVITE 3000) 3-70-15 mg-mcg-mg Tab Take by mouth once daily.   8/25/2023    furosemide (LASIX) 80 MG tablet Take 40 mg by mouth once daily.   8/25/2023    hydrALAZINE (APRESOLINE) 50 MG tablet Take 50 mg by mouth every evening.   8/25/2023    metoprolol succinate (TOPROL-XL) 50 MG 24 hr tablet Take 50 mg by mouth once daily.   8/25/2023    pantoprazole (PROTONIX) 40 MG tablet Take 40 mg by mouth nightly.   8/25/2023    sodium bicarbonate 650 MG tablet Take 650 mg by mouth every evening.   8/25/2023    traZODone (DESYREL) 50 MG tablet Take 50 mg by mouth every evening.   8/25/2023    valsartan (DIOVAN) 160 MG tablet Take 160 mg by mouth once daily.   8/25/2023    magnesium oxide (MAG-OX) 400 mg (241.3 mg magnesium) tablet Take 400 mg by mouth nightly.       [DISCONTINUED] traMADoL (ULTRAM) 50 mg tablet Take 1 tablet (50 mg total) by mouth every 6 (six) hours as needed for Pain. 20 tablet 0          Objective Findings:    Vital Signs:  BP (!) 133/57   Pulse 71   Temp 98 °F (36.7 °C) (Oral)   Resp 20   Ht 5' 1" (1.549 m)   Wt 60.3 kg (132 lb 15 oz)   " SpO2 95%   BMI 25.12 kg/m²   Body mass index is 25.12 kg/m².    Physical Exam:  Physical Exam  Constitutional:       General: She is not in acute distress.     Appearance: She is not ill-appearing.   HENT:      Head: Normocephalic and atraumatic.      Nose: Nose normal.   Eyes:      Extraocular Movements: Extraocular movements intact.   Cardiovascular:      Pulses: Normal pulses.   Pulmonary:      Effort: No respiratory distress.      Breath sounds: No stridor.   Abdominal:      General: Abdomen is flat. There is no distension.      Palpations: Abdomen is soft.      Tenderness: There is no abdominal tenderness. There is no guarding.      Comments: PD catheter in place   Skin:     General: Skin is warm.      Coloration: Skin is not jaundiced or pale.   Neurological:      General: No focal deficit present.      Mental Status: She is alert and oriented to person, place, and time. Mental status is at baseline.   Psychiatric:         Mood and Affect: Mood normal.         Behavior: Behavior normal.         Thought Content: Thought content normal.         Labs:  Recent Results (from the past 24 hour(s))   APTT    Collection Time: 08/24/23 12:58 PM   Result Value Ref Range    PTT 32.9 23.2 - 33.7 seconds   Comprehensive metabolic panel    Collection Time: 08/24/23 12:58 PM   Result Value Ref Range    Sodium Level 139 136 - 145 mmol/L    Potassium Level 4.4 3.5 - 5.1 mmol/L    Chloride 107 98 - 107 mmol/L    Carbon Dioxide 26 23 - 31 mmol/L    Glucose Level 97 82 - 115 mg/dL    Blood Urea Nitrogen 39.4 (H) 9.8 - 20.1 mg/dL    Creatinine 4.13 (H) 0.55 - 1.02 mg/dL    Calcium Level Total 9.9 8.4 - 10.2 mg/dL    Protein Total 6.9 5.8 - 7.6 gm/dL    Albumin Level 3.6 3.4 - 4.8 g/dL    Globulin 3.3 2.4 - 3.5 gm/dL    Albumin/Globulin Ratio 1.1 1.1 - 2.0 ratio    Bilirubin Total 0.4 <=1.5 mg/dL    Alkaline Phosphatase 76 40 - 150 unit/L    Alanine Aminotransferase 14 0 - 55 unit/L    Aspartate Aminotransferase 21 5 - 34 unit/L     eGFR 11 mls/min/1.73/m2   Protime-INR    Collection Time: 08/24/23 12:58 PM   Result Value Ref Range    PT 13.5 12.5 - 14.5 seconds    INR 1.0 <=1.3   Troponin I    Collection Time: 08/24/23 12:58 PM   Result Value Ref Range    Troponin-I 0.053 (H) 0.000 - 0.045 ng/mL   CBC with Differential    Collection Time: 08/24/23 12:58 PM   Result Value Ref Range    WBC 9.95 4.50 - 11.50 x10(3)/mcL    RBC 3.78 (L) 4.20 - 5.40 x10(6)/mcL    Hgb 11.9 (L) 12.0 - 16.0 g/dL    Hct 37.1 37.0 - 47.0 %    MCV 98.1 (H) 80.0 - 94.0 fL    MCH 31.5 (H) 27.0 - 31.0 pg    MCHC 32.1 (L) 33.0 - 36.0 g/dL    RDW 15.7 11.5 - 17.0 %    Platelet 233 130 - 400 x10(3)/mcL    MPV 10.0 7.4 - 10.4 fL    Neut % 70.1 %    Lymph % 19.0 %    Mono % 5.7 %    Eos % 3.9 %    Basophil % 1.0 %    Lymph # 1.89 0.6 - 4.6 x10(3)/mcL    Neut # 6.97 2.1 - 9.2 x10(3)/mcL    Mono # 0.57 0.1 - 1.3 x10(3)/mcL    Eos # 0.39 0 - 0.9 x10(3)/mcL    Baso # 0.10 <=0.2 x10(3)/mcL    IG# 0.03 0 - 0.04 x10(3)/mcL    IG% 0.3 %    NRBC% 0.0 %   Type & Screen    Collection Time: 08/24/23  1:14 PM   Result Value Ref Range    Group & Rh A POS     Indirect Avinash GEL NEG     Specimen Outdate 08/27/2023 23:59    ABORH RETYPE    Collection Time: 08/24/23  1:36 PM   Result Value Ref Range    ABORH Retype A POS    Hemoglobin and Hematocrit    Collection Time: 08/24/23  9:19 PM   Result Value Ref Range    Hgb 11.3 (L) 12.0 - 16.0 g/dL    Hct 35.3 (L) 37.0 - 47.0 %   Comprehensive Metabolic Panel    Collection Time: 08/25/23  5:39 AM   Result Value Ref Range    Sodium Level 143 136 - 145 mmol/L    Potassium Level 4.4 3.5 - 5.1 mmol/L    Chloride 110 (H) 98 - 107 mmol/L    Carbon Dioxide 22 (L) 23 - 31 mmol/L    Glucose Level 90 82 - 115 mg/dL    Blood Urea Nitrogen 43.2 (H) 9.8 - 20.1 mg/dL    Creatinine 4.37 (H) 0.55 - 1.02 mg/dL    Calcium Level Total 9.0 8.4 - 10.2 mg/dL    Protein Total 5.8 5.8 - 7.6 gm/dL    Albumin Level 3.2 (L) 3.4 - 4.8 g/dL    Globulin 2.6 2.4 - 3.5 gm/dL     Albumin/Globulin Ratio 1.2 1.1 - 2.0 ratio    Bilirubin Total 0.4 <=1.5 mg/dL    Alkaline Phosphatase 70 40 - 150 unit/L    Alanine Aminotransferase 13 0 - 55 unit/L    Aspartate Aminotransferase 19 5 - 34 unit/L    eGFR 10 mls/min/1.73/m2   Magnesium    Collection Time: 08/25/23  5:39 AM   Result Value Ref Range    Magnesium Level 2.20 1.60 - 2.60 mg/dL   Phosphorus    Collection Time: 08/25/23  5:39 AM   Result Value Ref Range    Phosphorus Level 4.8 (H) 2.3 - 4.7 mg/dL   CBC with Differential    Collection Time: 08/25/23  5:43 AM   Result Value Ref Range    WBC 9.73 4.50 - 11.50 x10(3)/mcL    RBC 3.43 (L) 4.20 - 5.40 x10(6)/mcL    Hgb 10.9 (L) 12.0 - 16.0 g/dL    Hct 33.7 (L) 37.0 - 47.0 %    MCV 98.3 (H) 80.0 - 94.0 fL    MCH 31.8 (H) 27.0 - 31.0 pg    MCHC 32.3 (L) 33.0 - 36.0 g/dL    RDW 15.5 11.5 - 17.0 %    Platelet 204 130 - 400 x10(3)/mcL    MPV 10.1 7.4 - 10.4 fL    Neut % 56.6 %    Lymph % 27.5 %    Mono % 8.6 %    Eos % 5.8 %    Basophil % 1.1 %    Lymph # 2.68 0.6 - 4.6 x10(3)/mcL    Neut # 5.50 2.1 - 9.2 x10(3)/mcL    Mono # 0.84 0.1 - 1.3 x10(3)/mcL    Eos # 0.56 0 - 0.9 x10(3)/mcL    Baso # 0.11 <=0.2 x10(3)/mcL    IG# 0.04 0 - 0.04 x10(3)/mcL    IG% 0.4 %    NRBC% 0.0 %       CT Abdomen Pelvis  Without Contrast   Final Result      1. Gastric lumen  hyperdensities may represent ingested contents though some hemorrhage could have similar appearance.      2. Overall limited assessment of the colon due to lack of contrast and colonic fecal loading.  Noninflamed diverticulosis coli extensively involve the distal descending and sigmoid colon.  Please correlate patient is up-to-date on colonoscopy..         Electronically signed by: Stuart Vázquez   Date:    08/24/2023   Time:    18:49            Assessment/Plan:  78 year old known to Dr. Biggs with PMHx ESRD on HD (T, Sat), COPD, GERD, s/p CVA on Plavix, bladder cancer and HTN who presented to the ED with worsening dark stools for the past 2 months  associated with weakness.     Melena  Anemia  - keep Npo with plans for EGD today   - continue PPI IV      Thank you for allowing us to participate in the care of Deepti Zambrano.    Tricia Hauser PA-C  Gastroenterology  [facility]

## 2023-08-25 NOTE — ANESTHESIA POSTPROCEDURE EVALUATION
Anesthesia Post Evaluation    Patient: Deepti Zambrano    Procedure(s) Performed: Procedure(s) (LRB):  EGD (N/A)    Final Anesthesia Type: general      Patient location during evaluation: PACU  Patient participation: Yes- Able to Participate  Level of consciousness: awake and alert  Post-procedure vital signs: reviewed and stable  Pain management: adequate  Airway patency: patent    PONV status at discharge: No PONV  Anesthetic complications: no      Cardiovascular status: hemodynamically stable  Respiratory status: unassisted  Hydration status: euvolemic  Follow-up not needed.        Pt has RUE IV that does not free flow.  Pt requested another IV after asleep which I was able to place a 22G in the inner wrist of her right arm.  I did not remove the previous IV as it does flush easily and remains intravenous        No case tracking events are documented in the log.      Pain/Caroline Score: Pain Rating Prior to Med Admin: 8 (8/25/2023  9:50 AM)

## 2023-08-28 NOTE — PROGRESS NOTES
C3 nurse spoke with Deepti Zambrano for a TCC post hospital discharge follow up call. The patient does not have a scheduled HOSFU appointment, and the pt does not have an [facility].  Patient stated she can just walk in and does not need an appointment.

## 2023-09-01 NOTE — DISCHARGE SUMMARY
[facility] Hospital Medicine Discharge Summary    Admit Date: 8/24/2023  Discharge Date and Time: 8/25/2023, 1:19 PM  Admitting Physician:  Team  Discharging Physician: Arnold Vanegas MD.  Primary Care Physician: Saleem Juan MD  Consults: Gastroenterology and Hospital Medicine    Discharge Diagnoses:  Acute GI bleed, suspected upper GI bleed ,resolved   Acute blood loss anemia   Elevated Troponin, likely NSTEMI Type II 2/2 above  ESRD on HD Tuesday and Saturday   Essential hypertension  COPD not in exacerbation   History of CVA with residual left-sided weakness  History of bladder cancer, GERD, gout       Hospital Course:   A. History:   78 y.o. female with a PMHx of HTN, COPD, history of CVA with residual left-sided weakness, history of bladder cancer, GERD, gout, ESRD on HD who presented to [facility] on 8/24/2023 with c/o worsening black stools and generalized weakness over the past 2-1/2 months.  She was so weak she was unable to walk long distances without having to sit down to take a break.  Last colonoscopy times 10 years ago.  She is on Plavix.  She was given IV Protonix in the ED. GI Services were consulted in ED.     Admitted to hospital medicine services for further medical management.    GI was consulted patient was taking for upper GI endoscopy and normal.  Patient discharged home to follow-up outpatient for further management        Initial ED VS   /80, HR 70, RR 20, SpO2 94% on room air, temperature 97.9° F.       Labs   hemoglobin 11.3, hematocrit 35.3, BUN 39.4, creatinine 4.13, troponin 0.053.   Stool was guaiac positive.      Imaging  EKG demonstrated NSR.      CT abdomen and pelvis without contrast demonstrated gastric lumen hyperdensities may represent ingested contents though some hemorrhage could have similar appearance;  noninflamed diverticulosis coli extensively involved the distal descending and sigmoid colon.          Physical exam:  General  "appearance:  Chronically ill-appearing female in no apparent distress.  HENT: Atraumatic head. Moist mucous membranes of oral cavity.  Lungs: Clear to auscultation bilaterally.   Heart: Regular rate and rhythm. S1 and S2 present. No pedal edema.  Abdomen: Soft, non-distended, non-tender.  Extremities: No cyanosis, clubbing  Skin:  Pallor  Neuro: Motor and sensory exams grossly intact.   Psych/mental status: Appropriate mood and affect. Responds appropriately to questions.           Pt was seen and examined on the day of discharge  Vitals:  VITAL SIGNS: 24 HRS MIN & MAX LAST   No data recorded 98 °F (36.7 °C)   No data recorded (!) 154/44   No data recorded  (!) 111   No data recorded 18   No data recorded (!) 93 %           Procedures Performed: No admission procedures for hospital encounter.     Significant Diagnostic Studies: See Full reports for all details    No results for input(s): "WBC", "RBC", "HGB", "HCT", "MCV", "MCH", "MCHC", "RDW", "PLT", "MPV", "GRAN", "LYMPH", "MONO", "BASO", "NRBC" in the last 168 hours.    No results for input(s): "NA", "K", "CL", "CO2", "ANIONGAP", "BUN", "CREATININE", "GLU", "CALCIUM", "PH", "MG", "ALBUMIN", "PROT", "ALKPHOS", "ALT", "AST", "BILITOT" in the last 168 hours.     Microbiology Results (last 7 days)       ** No results found for the last 168 hours. **             CT Abdomen Pelvis  Without Contrast  Narrative: EXAMINATION:  CT ABDOMEN PELVIS WITHOUT CONTRAST    CLINICAL HISTORY:  GI bleed;    TECHNIQUE:  Multidetector axial images were obtained from the  diaphragms to below symphysis pubis without the administration of IV contrast. Oral contrast was not administered.    Dose length product of 230 mGycm. Automated exposure control was utilized to minimize radiation dose.    COMPARISON:  None available    FINDINGS:  Included lungs some chronic interstitial changes of lungs without, acute air space infiltrates.  Trace of right pleural effusion right basilar " atelectasis..    Within limitations of noncontrast technique, no acute findings of the liver, pancreas and spleen identified. Gallbladder is surgically absent no apparent biliary dilation.    The adrenal glands noncontrast evaluation is unremarkable.  Right kidney is not present.  Left kidney cortical volume is adequate.  Left kidney upper pole small cortical calculi.  There is no left hydronephrosis or perinephric strandings.  There is also no left hydroureter.  Calcified plaques of the abdominal aorta and iliac arteries.  Calcified plaques also involve the celiac trunk and the superior mesenteric artery and distally bilateral renal arteries.    Stomach is mostly decompressed.  There are some hyperdensities within the gastric lumen which may be related to ingested content though some hemorrhagic component is without exclusion.  No pericardiac inflammatory strandings.  Small-bowel loops are without dilatation.  Appendix is not visualized.  Limited assessment of the colon due to fecal content especially the cecum ascending colon which also show some mural prominence could be secondary to decompression.  Noninflamed diverticulosis coli involves the descending and the sigmoid colon.  No bowel obstruction.  No free air or free fluid.    Urinary bladder appears within normal limits. No intravesical stone identified. There is no pelvic free fluid.    There is Schmorl node defect along the superior endplate of L4.  No acute or aggressive skeletal abnormality.  Impression: 1. Gastric lumen  hyperdensities may represent ingested contents though some hemorrhage could have similar appearance.    2. Overall limited assessment of the colon due to lack of contrast and colonic fecal loading.  Noninflamed diverticulosis coli extensively involve the distal descending and sigmoid colon.  Please correlate patient is up-to-date on colonoscopy..    Electronically signed by: Stuart Vázquez  Date:    08/24/2023  Time:    18:49          Medication List        CONTINUE taking these medications      allopurinoL 100 MG tablet  Commonly known as: ZYLOPRIM     amitriptyline 25 MG tablet  Commonly known as: ELAVIL     amLODIPine 10 MG tablet  Commonly known as: NORVASC     atorvastatin 40 MG tablet  Commonly known as: LIPITOR     calcitRIOL 0.5 MCG Cap  Commonly known as: ROCALTROL     DIALYVITE 3000 3-70-15 mg-mcg-mg Tab  Generic drug: folic acid-B lxpe-Z-oauku-zinc     docusate sodium 50 MG capsule  Commonly known as: COLACE     furosemide 80 MG tablet  Commonly known as: LASIX     hydrALAZINE 50 MG tablet  Commonly known as: APRESOLINE     magnesium oxide 400 mg (241.3 mg magnesium) tablet  Commonly known as: MAG-OX     metoprolol succinate 50 MG 24 hr tablet  Commonly known as: TOPROL-XL     pantoprazole 40 MG tablet  Commonly known as: PROTONIX     sodium bicarbonate 650 MG tablet     traZODone 50 MG tablet  Commonly known as: DESYREL     valsartan 160 MG tablet  Commonly known as: DIOVAN            STOP taking these medications      clopidogreL 75 mg tablet  Commonly known as: PLAVIX               Explained in detail to the patient about the discharge plan, medications, and follow-up visits. Pt understands and agrees with the treatment plan  Discharge Disposition: Home or Self Care   Discharged Condition: stable  Diet-    Medications Per DC med rec  Activities as tolerated   Follow-up Information       Saleem Juan MD. Schedule an appointment as soon as possible for a visit in 2 week(s).    Specialty: Family Medicine  Contact information:  [address] [place] [address]  [phone]                           For further questions contact hospitalist office    Discharge time 33 minutes    For worsening symptoms, chest pain, shortness of breath, increased abdominal pain, high grade fever, stroke or stroke like symptoms, immediately go to the nearest Emergency Room or call 911 as soon as possible.      Arnold Quintana M.D, on  8/25/2023 . at 1:19 PM.

## 2023-09-05 NOTE — PHYSICIAN QUERY
PT Name: Deepti Zambrano  MR #: [id number]     Documentation Clarification      CDS/: Barbara Ambriz RN BSN               Contact information:[email or web address]    This form is a permanent document in the medical record.     Query Date: September 5, 2023    By submitting this query, we are merely seeking further clarification of documentation. Please utilize your independent clinical judgment when addressing the question(s) below.    The Medical Record reflects the following:    Clinical Findings Location in Medical Records   8/24 ED  EKG Readings: (Independently Interpreted)   Initial Reading: No STEMI. Previous EKG: Compared with most recent EKG Previous EKG Date: 5/13/23. Rhythm: Normal Sinus Rhythm. Heart Rate: 65. Conduction: Normal. ST Segments: Normal ST Segments. ST Segment Depression: II, III and AVF. T Waves: Normal. Axis: Normal.   ST depression noted in inferior leads, however was also present on EKG on 5/13/23 8/24 EKG 12-lead  Vent. Rate : 065 BPM     Atrial Rate : 065 BPM     P-R Int : 160 ms          QRS Dur : 090 ms      QT Int : 414 ms       P-R-T Axes : 068 065 097 degrees     QTc Int : 430 ms     Normal sinus rhythm  Nonspecific ST abnormality  Abnormal ECG  When compared with ECG of 13-MAY-2023 12:21,  No significant change was found  Confirmed by Wild Cisneros MD ([id number]) on 8/24/2023 8:58:20 PM    8/24 Troponin 0.053      9/1 D/C Summary  Acute GI bleed, suspected upper GI bleed ,resolved   Acute blood loss anemia   Elevated Troponin, likely NSTEMI Type II 2/2 above 8/24/23 ED                  8/24/23 EKG 12-lead                        8/24/23 Lab        9/1/23 D/C Summary            Please clinically validate the diagnosis of NSTEMI Type II.  If validated, please provide additional clinical support for the diagnosis.   Select all that apply.      [   ] Above stated diagnosis is not confirmed and/or it has been ruled out   [   ] Above stated diagnosis is confirmed. Please  specify clinical support (signs, symptoms, and treatment) for  the confirmed diagnosis: ____________________   [ xx  ] Demand Ischemia no infarction   [   ] Other clarification (please specify): ___________________         Form No. [id number]

## 2023-09-07 NOTE — TELEPHONE ENCOUNTER
Pt called to make sure she had the correct information about her blood thinners- informed pt to stop 5 days prior to 9/28/23 surgery date  She voiced understanding

## 2023-09-25 NOTE — DISCHARGE INSTRUCTIONS
Dr. Echols's Post Operative Abdominal Hernia Repair Discharge Instructions:    - No heavy lifting (anything over 20 pounds) or straining for 4 weeks post op.   - No driving for 3 days or as long as taking pain medication.  - Wear abdominal binder as needed for post op comfort.     - Keep surgical dressing clean and dry for 48 hours post op, then ok to remove gauze surgical dressing and shower.  - Wash incision daily with antibacterial soap and water. Pat dry.   - Do not remove steri strips. After post op day 7-14, steri strips edges will begin to curl, loosen, and peel off.      - Call Dr. Echols's office with any questions or concerns regarding wound care/incisions. [phone].      Abdominal Hernia/Ventral Hernia/ Umbilical Hernia    A ventral hernia is a bulge of tissue from inside the abdomen that pushes through a weak area of the muscles that form the front wall of the abdomen. The tissues inside the abdomen are inside a sac (peritoneum). These tissues include the small intestine, large intestine, and the fatty tissue that covers the intestines (omentum). Sometimes, the bulge that forms a hernia contains intestines. Other hernias contain only fat. Ventral hernias do not go away without surgical treatment.  There are several types of ventral hernias. You may have:   A hernia at an incision site from previous abdominal surgery (incisional hernia).   A hernia just above the belly button (epigastric hernia), or at the belly button (umbilical hernia). These types of hernias can develop from heavy lifting or straining.   A hernia that comes and goes (reducible hernia). It may be visible only when you lift or strain. This type of hernia can be pushed back into the abdomen (reduced).   A hernia that traps abdominal tissue inside the hernia (incarcerated hernia). This type of hernia does not reduce.   A hernia that cuts off blood flow to the tissues inside the hernia (strangulated hernia). The tissues can start to  die if this happens. This is a very painful bulge that cannot be reduced.   A strangulated hernia is a medical emergency.  What are the causes?  This condition is caused by abdominal tissue putting pressure on an area of weakness in the abdominal muscles.  What increases the risk?  The following factors may make you more likely to develop this condition:   Being age 60 or older.   Being overweight or obese.   Having had previous abdominal surgery, especially if there was an infection after surgery.   Having had an injury to the abdominal wall.   Frequently lifting or pushing heavy objects.   Having had several pregnancies.   Having a buildup of fluid inside the abdomen (ascites).   Straining to have a bowel movement or to urinate.   Having frequent coughing episodes.  What are the signs or symptoms?  The only symptom of a ventral hernia may be a painless bulge in the abdomen. A reducible hernia may be visible only when you strain, cough, or lift. Other symptoms may include:   Dull pain.   A feeling of pressure.  Signs and symptoms of a strangulated hernia may include:   Increasing pain.   Nausea and vomiting.   Pain when pressing on the hernia.   The skin over the hernia turning red or purple.   Constipation.   Blood in the stool (feces).  How is this treated?  This condition is treated with surgery. If you have a strangulated hernia, surgery is done as soon as possible. If your hernia is small and not incarcerated, you may be asked to lose some weight before surgery.    Laparoscopic Abdomina/Ventral Hernia Repair, Care After  This sheet gives you information about how to care for yourself after your procedure. Your health care provider may also give you more specific instructions. If you have problems or questions, contact your health care provider.  What can I expect after the procedure?  After the procedure, it is common to have:   Pain, discomfort, or soreness.  Follow these instructions at  home:  Medicines   Take over-the-counter and prescription medicines only as told by your health care provider.   Ask your health care provider if the medicine prescribed to you:  ? Requires you to avoid driving or using machinery.  ? Can cause constipation. You may need to take these actions to prevent or treat constipation:  ? Take over-the-counter or prescription medicines.  ? Eat foods that are high in fiber, such as beans, whole grains, and fresh fruits and vegetables.  ? Limit foods that are high in fat and processed sugars, such as fried or sweet foods.  Incision care     Follow instructions from your health care provider about how to take care of your incisions. Make sure you:  ? Wash your hands with soap and water for at least 20 seconds before and after you change your bandage (dressing) or before you touch your abdomen. If soap and water are not available, use hand .  ? Change your dressing as told by your health care provider.     Check your incision areas every day for signs of infection. Check for:  ? More redness, swelling, or pain.  ? Fluid or blood.  ? Warmth.  ? Pus or a bad smell.  Bathing     Do not take baths, swim, or use a hot tub until cleared by your surgeon. Ok to shower.    Keep your dressing dry until your health care provider says it can be removed.  Activity     Rest as told by your health care provider.   Avoid sitting for a long time without moving. Get up to take short walks every 1-2 hours. This is important to improve blood flow and breathing. Ask for help if you feel weak or unsteady.   Do not lift anything that is heavier than 20 lb (4.5 kg), or the limit that you are told, until your health care provider says that it is safe.   If you were given a sedative during the procedure, it can affect you for several hours. Do not drive or operate machinery until your health care provider says that it is safe.   Return to your normal activities as told by your health care  provider. Ask your health care provider what activities are safe for you.  General instructions     Drink enough fluid to keep your urine pale yellow.   Hold a pillow over your abdomen when you cough or sneeze. This helps with pain.   Do not use any products that contain nicotine or tobacco, such as cigarettes, e-cigarettes, and chewing tobacco. These can delay incision healing after surgery. If you need help quitting, ask your health care provider.   You may be asked to continue to do deep breathing exercises at home. This will help to prevent a lung infection.   Keep all follow-up visits as told by your health care provider. This is important.  Contact a health care provider if:   You have any of these signs of infection:  ? More redness, swelling, or pain around an incision.  ? Fluid or blood coming from an incision.  ? Warmth coming from an incision.  ? Pus or a bad smell coming from an incision.  ? A fever or chills.   You have pain that gets worse or does not get better with medicine.   You have nausea or vomiting.   You have a cough.   You have shortness of breath.   You have not had a bowel movement in 3 days.   You are not able to urinate.  Get help right away if you have:   Severe pain in your abdomen.   Persistent nausea and vomiting.   Redness, warmth, or pain in your leg.   Chest pain.   Trouble breathing.  These symptoms may represent a serious problem that is an emergency. Do not wait to see if the symptoms will go away. Get medical help right away. Call your local emergency services (911 in the [place] Do not drive yourself to the hospital.  Summary   After this procedure, it is common to have pain, discomfort, or soreness.   Follow instructions from your health care provider about how to take care of your incision.   Check your incision area every day for signs of infection. Report any signs of infection to your health care provider.   Keep all follow-up visits as told by your health care provider.  This is important.  This information is not intended to replace advice given to you by your health care provider. Make sure you discuss any questions you have with your health care provider.    Patient Education       Surgical Wound Discharge Instructions     What care is needed at home?   Any cut made on the skin gives germs easy access to cause an infection. A wound infection can start from 2 days to 3 weeks after surgery. The infection may spread deeper in the body if it is not treated. You may start to feel unwell and serious health problems could happen. An infection may also cause the cut to open up again. These things may help you prevent an infection:  Wash your hands before and after touching your cut site. Use soap and water for at least 20 seconds. Alcohol-based hand sanitizers also work to kill germs.  Keep your wound clean and dry for the first 24 hours. You can take a shower after 24 hours or when your doctor tells you to. You may use soap and water to wash your wound. Make sure not to soak it. Gently towel-dry the wound afterwards.  Take the medications your doctor may prescribe you as ordered.    What follow-up care is needed?   If you have stitches or staples, you will need to have them taken out. Your doctor will often want to do this in 1 to 2 weeks. Your doctor may also use skin glue or special tape to close your wound. Do not take the glue or tape off. It will peel off within 1-2 days.     What lifestyle changes are needed?   Stop or lessen smoking.  Get lots of rest. Sleep when you are feeling tired. Avoid doing tiring activities.  Follow a healthy diet.  Eat many different foods rich in nutrients from all food groups.   Stay away from sugars and fats. Limit sweets and fatty foods such as desserts, fried foods, and chips. Use good fats found in fish, nuts, avocados, and oils, like olive oil and canola oil. Cut back on solid fats (butter, lard, margarine).  Try to eat more low fat or lean meats.  Eat less red meat and eat more fish, chicken, turkey, and beans instead.  Drink 6 to 8 cups (1440 to 1920 mL) of water each day unless your doctor has told you to limit your fluids.  Avoid swimming and hot tubs until incision is healed.    Will physical activity be limited?   Movement may be limited if your wound is in an area that you use a lot. These areas are more likely to break open. Take extra care if you have a wound on a place like your hand, elbow, or knee.  Avoid stretching and pulling activities. These may cause your scar to pull apart. Call your doctor if this happens.  Ask your doctor when it is safe for you to do things like run, work out, or play sports.     What problems could happen?   Infection of the whole body. This is sepsis.  Poorly healed wound may leave big scars.  The wound may spontaneously open or break away from the stitches or staples. This is wound dehiscence.     When do I need to call the doctor?   Signs of a very bad reaction. These include wheezing; chest tightness; fever; itching; bad cough; blue skin color; seizures; or swelling of face, lips, tongue, or throat. Go to the ER right away.  Signs of infection. These include a fever of 100.4°F (38°C) or higher, chills, wound that will not heal, pain.  Signs of wound infection. These include swelling, redness, warmth around the wound; too much pain when touched; yellowish, greenish, or bloody discharge; foul smell coming from the cut site; cut site opens up.    Helpful tips   Wear loose clothing. Good blood supply is important for healing. You may also be more comfortable.  Do not remove your bandages unless your doctor says so.  If your wound suddenly bleeds, apply pressure to help stop the bleeding. See you doctor right away.  If you have high blood sugar, work with your doctor to keep it in a normal range.

## 2023-09-27 NOTE — ANESTHESIA PREPROCEDURE EVALUATION
09/27/2023  Deepti Zambrano is a 78 y.o., female , who presents for the following:    Procedures:        REPAIR, HERNIA, VENTRAL, LAPAROSCOPIC (Abdomen)       REMOVAL, CATHETER, DIALYSIS, PERITONEAL, LAPAROSCOPIC   Anesthesia type: General   Diagnosis:        Ventral hernia without obstruction or gangrene [K43.9]       Ureteral sludge [N28.9]   Pre-op diagnosis:        Ventral hernia without obstruction or gangrene [K43.9]       Ureteral sludge [N28.9]   Location: [facility] OR [address] [place] [address] OR   Surgeons: Edmund Echols Jr., MD     HPI:        TTE:   Eccentric hypertrophy and mildly decreased systolic function.   Mild pulmonic regurgitation.   Mild tricuspid regurgitation.   Moderate mitral regurgitation.   Normal right ventricular size with normal right ventricular systolic function.   The estimated ejection fraction is 40%.   Grade I left ventricular diastolic dysfunction.           Pre-op Assessment    I have reviewed the Patient Summary Reports.     I have reviewed the Nursing Notes. I have reviewed the NPO Status.   I have reviewed the Medications.     Review of Systems  Anesthesia Hx:  No problems with previous Anesthesia  Denies Family Hx of Anesthesia complications.   Denies Personal Hx of Anesthesia complications.   Social:  Smoker    Hematology/Oncology:        Oncology Comments: Bladder CA  Renal CA   Cardiovascular:   CAD, s/p stent   Pulmonary:   COPD Shortness of breath    Renal/:   ESRD, now on HD, Tue/Sat;  Last Dialysis : 2days ago   Hepatic/GI:   GERD H/O GIB   Neurological:   CVA    Endocrine:   Gout       Physical Exam  General: Alert and Oriented    Airway:  Mallampati: III   Mouth Opening: Small, but > 3cm  TM Distance: 4 - 6 cm  Tongue: Normal  Neck ROM: Normal ROM    Dental:  Dentures    Chest/Lungs:  Normal Respiratory Rate    Heart:  Rate: Normal  Rhythm: Regular  Rhythm        Anesthesia Plan  Type of Anesthesia, risks & benefits discussed:    Anesthesia Type: Gen ETT  Intra-op Monitoring Plan: Standard ASA Monitors  Post Op Pain Control Plan: IV/PO Opioids PRN and multimodal analgesia  Induction:  IV  Airway Plan: Direct, Post-Induction  Informed Consent: Informed consent signed with the Patient and all parties understand the risks and agree with anesthesia plan.  All questions answered. Patient consented to blood products? Yes  ASA Score: 4  Day of Surgery Review of History & Physical: H&P Update referred to the surgeon/provider.    Ready For Surgery From Anesthesia Perspective.     .

## 2023-09-28 NOTE — H&P
"HISTORY & PHYSICAL  General Surgery    Patient Name: Deepti Zambrano  YOB: 1945    Date: 09/28/2023                     PRESENTING HISTORY     Chief Complaint/Reason for Admission: "PD cath malfunction"    History of Present Illness:  Ms. Deepti Zambrano is a 78 y.o. female with chronic kidney disease referred for peritoneal dialysis catheter removal. The PD cath was originally placed on 6/22/22, revised on 7/28/22 & again on 5/30/23. She reports PD is no longer working for her. She has resumed hemodialysis. Denies previous abdominal infections. Denies fever / chills. Denies CP / SOB.      Review of Systems:  12 point ROS negative except as stated in HPI    PAST HISTORY:     Past Medical History:   Diagnosis Date    Anesthesia complication     trouble awaking    CAD (coronary artery disease)     CORONARY STENT    Chronic kidney disease, unspecified     COPD (chronic obstructive pulmonary disease)     COVID-19     week of Mothers Day 2023    Dialysis patient     CSI tues AND sat  chair time 0630    GERD (gastroesophageal reflux disease)     GI bleed     Gout     Hx of bladder cancer     Hypertension     Kidney cancer, primary, with metastasis from kidney to other site, right     Muscle weakness of extremity     bilateral lower    Post-COVID chronic cough     Sciatic nerve pain     SOB (shortness of breath) on exertion     Stroke     1997, small memory loss with  left sided weakness       Past Surgical History:   Procedure Laterality Date    AV FISTULA PLACEMENT Left     BACK SURGERY      CATARACT EXTRACTION W/  INTRAOCULAR LENS IMPLANT Bilateral     CHOLECYSTECTOMY      COLONOSCOPY      CORONARY ANGIOPLASTY WITH STENT PLACEMENT      Dr. Strickland 18 years ago    DIAGNOSTIC LAPAROSCOPY N/A 07/28/2022    Procedure: LAPAROSCOPY, DIAGNOSTIC;  Surgeon: Edmund Echols Jr., MD;  Location: [facility];  Service: General;  Laterality: N/A;    DIAGNOSTIC LAPAROSCOPY N/A 05/30/2023    Procedure: " LAPAROSCOPY, DIAGNOSTIC;  Surgeon: Edmund Echols Jr., MD;  Location: [facility] OR;  Service: General;  Laterality: N/A;    ESOPHAGOGASTRODUODENOSCOPY N/A 08/25/2023    Procedure: EGD;  Surgeon: Elan Leigh MD;  Location: [facility] ENDOSCOPY;  Service: Gastroenterology;  Laterality: N/A;    HYSTERECTOMY      LAPAROSCOPIC REVISION OF PROCEDURE INVOLVING PERITONEAL DIALYSIS CATHETER  05/30/2023    Procedure: REVISION OF PROCEDURE INVOLVING PERITONEAL DIALYSIS CATHETER, LAPAROSCOPIC;  Surgeon: Edmund Echols Jr., MD;  Location: [facility] OR;  Service: General;;    NECK SURGERY      cervical    PERITONEAL CATHETER INSERTION      right kidney removed Right     TONSILLECTOMY         Family History   Problem Relation Age of Onset    Cancer Mother     Heart attack Father        Social History     Socioeconomic History    Marital status:    Tobacco Use    Smoking status: Every Day     Current packs/day: 1.00     Types: Cigarettes, Vaping with nicotine     Start date: 2022    Smokeless tobacco: Never   Substance and Sexual Activity    Alcohol use: Yes     Alcohol/week: 2.0 standard drinks of alcohol     Types: 1 Glasses of wine, 1 Cans of beer per week     Comment: occasionally    Drug use: Never    Sexual activity: Not Currently       MEDICATIONS & ALLERGIES:     No current facility-administered medications on file prior to encounter.     Current Outpatient Medications on File Prior to Encounter   Medication Sig    allopurinoL (ZYLOPRIM) 100 MG tablet Take 100 mg by mouth nightly.    amitriptyline (ELAVIL) 25 MG tablet Take 25 mg by mouth every evening.    amLODIPine (NORVASC) 10 MG tablet Take 10 mg by mouth nightly.    atorvastatin (LIPITOR) 40 MG tablet Take 40 mg by mouth every evening.    clopidogreL (PLAVIX) 75 mg tablet Take 75 mg by mouth once daily.    docusate sodium (COLACE) 50 MG capsule Take 150 mg by mouth every evening.    folic acid-B amih-B-jzrus-zinc (DIALYVITE 3000) 3-70-15 mg-mcg-mg Tab Take by  mouth once daily.    furosemide (LASIX) 80 MG tablet Take 40 mg by mouth once daily.    hydrALAZINE (APRESOLINE) 50 MG tablet Take 50 mg by mouth every evening.    magnesium oxide (MAG-OX) 400 mg (241.3 mg magnesium) tablet Take 400 mg by mouth 2 (two) times daily.    metoprolol succinate (TOPROL-XL) 50 MG 24 hr tablet Take 50 mg by mouth once daily.    pantoprazole (PROTONIX) 40 MG tablet Take 40 mg by mouth nightly.    sodium bicarbonate 650 MG tablet Take 650 mg by mouth every evening.    traZODone (DESYREL) 50 MG tablet Take 50 mg by mouth every evening.    valsartan (DIOVAN) 160 MG tablet Take 160 mg by mouth once daily.    [DISCONTINUED] calcitRIOL (ROCALTROL) 0.5 MCG Cap Take 0.5 mcg by mouth every Mon, Wed, Fri.       Review of patient's allergies indicates:   Allergen Reactions    Penicillins Itching and Hallucinations    Ciprofloxacin Itching    Codeine Itching       OBJECTIVE:     Vital Signs:  Temp:  [98.3 °F (36.8 °C)] 98.3 °F (36.8 °C)  Pulse:  [67] 67  Resp:  [20] 20  SpO2:  [98 %] 98 %  BP: (153)/(66) 153/66  Body mass index is 23.2 kg/m².     Physical Exam:  General:  Well developed, well nourished, no acute distress  HEENT:  Normocephalic, atraumatic, PERRL, EOMI, clear sclera, ears normal, neck supple, throat clear without erythema or exudates  CVS:  RRR, S1 and S2 normal, no murmurs, rubs, gallops  Resp:  Lungs clear to auscultation, no wheezes, rales, rhonchi, cough  GI:  Abdomen soft, non-tender, non-distended, normoactive bowel sounds, no masses. PD cath to LLQ, dsg c/d/I. Small supraumbilical ventral hernia, reducible, NTTP.  :  Deferred  MSK:  No muscle atrophy, cyanosis, peripheral edema, full range of motion  Skin:  No rashes, ulcers, erythema  Neuro:  CNII-XII grossly intact  Psych:  Alert and oriented to person, place, and time      Diagnostic Results:  CT Impression:     1. Gastric lumen  hyperdensities may represent ingested contents though some hemorrhage could have similar  appearance.     2. Overall limited assessment of the colon due to lack of contrast and colonic fecal loading.  Noninflamed diverticulosis coli extensively involve the distal descending and sigmoid colon.  Please correlate patient is up-to-date on colonoscopy.      ASSESSMENT & PLAN:   Ms. Deepti Zambrano is a 78 y.o. female with malfunctioning PD catheter and small reducible ventral hernia    Plan:  - laparoscopic PD catheter removal & ventral hernia repair  - preoperative workup          Maria Isabel Quintanilla  General Surgery    9/28/2023  7:42 AM

## 2023-09-28 NOTE — ANESTHESIA POSTPROCEDURE EVALUATION
Anesthesia Post Evaluation    Patient: Deepti Zambrano    Procedure(s) Performed: Procedure(s) (LRB):  REMOVAL, CATHETER, DIALYSIS, PERITONEAL, LAPAROSCOPIC (N/A)    Final Anesthesia Type: general      Patient location during evaluation: OPS  Patient participation: Yes- Able to Participate  Level of consciousness: awake and oriented  Post-procedure vital signs: reviewed and stable  Pain management: adequate  Airway patency: patent    PONV status at discharge: No PONV  Anesthetic complications: no      Cardiovascular status: hemodynamically stable  Respiratory status: unassisted and spontaneous ventilation  Hydration status: euvolemic  Follow-up not needed.          Vitals Value Taken Time   /76 09/28/23 0958   Temp 36.9 °C (98.4 °F) 09/28/23 0928   Pulse 65 09/28/23 0958   Resp 17 09/28/23 0928   SpO2 100 % 09/28/23 0958         Event Time   Out of Recovery 09:23:00         Pain/Caroline Score: Caroline Score: 10 (9/28/2023  9:28 AM)  Modified Caroline Score: 20 (9/28/2023 10:25 AM)

## 2023-09-28 NOTE — TRANSFER OF CARE
"Anesthesia Transfer of Care Note    Patient: Deepti Zambrano    Procedure(s) Performed: Procedure(s) (LRB):  REMOVAL, CATHETER, DIALYSIS, PERITONEAL, LAPAROSCOPIC (N/A)    Patient location: PACU    Anesthesia Type: general    Transport from OR: Transported from OR on room air with adequate spontaneous ventilation    Post pain: adequate analgesia    Post assessment: no apparent anesthetic complications    Post vital signs: stable    Level of consciousness: responds to stimulation    Nausea/Vomiting: no nausea/vomiting    Complications: none    Transfer of care protocol was followed      Last vitals:   Visit Vitals  BP (!) 153/66   Pulse 67   Temp 36.8 °C (98.3 °F) (Oral)   Resp 20   Ht 5' 1" (1.549 m)   Wt 55.7 kg (122 lb 12.7 oz)   SpO2 98%   Breastfeeding No   BMI 23.20 kg/m²     "

## 2023-09-28 NOTE — DISCHARGE SUMMARY
[facility] Surgical - Periop Services  Discharge Note  Short Stay    Procedure(s) (LRB):  REMOVAL, CATHETER, DIALYSIS, PERITONEAL, LAPAROSCOPIC (N/A)      OUTCOME: Patient tolerated treatment/procedure well without complication and is now ready for discharge.    DISPOSITION: Home or Self Care    FINAL DIAGNOSIS:  Peritoneal dialysis catheter dysfunction    FOLLOWUP: In clinic    DISCHARGE INSTRUCTIONS:  No discharge procedures on file.     TIME SPENT ON DISCHARGE:    minutes

## 2023-09-28 NOTE — ANESTHESIA PROCEDURE NOTES
Intubation    Date/Time: 9/28/2023 7:51 AM    Performed by: Barbara Barrow CRNA  Authorized by: Pk Frances MD    Intubation:     Induction:  Intravenous    Intubated:  Postinduction    Mask Ventilation:  Easy mask    Attempts:  1    Attempted By:  CRNA    Method of Intubation:  Direct    Blade:  Good 2    Laryngeal View Grade: Grade I - full view of cords      Difficult Airway Encountered?: No      Complications:  None    Airway Device:  Oral endotracheal tube    Airway Device Size:  6.5    Style/Cuff Inflation:  Cuffed (inflated to minimal occlusive pressure)    Inflation Amount (mL):  3    Tube secured:  21    Secured at:  The lips    Placement Verified By:  Capnometry    Complicating Factors:  None    Findings Post-Intubation:  BS equal bilateral

## 2023-09-28 NOTE — PLAN OF CARE
VSS, juan luis score  10, pt arousable and ready for transfer to [facility] per Dr Frances

## 2023-09-28 NOTE — OP NOTE
Patient:  Deepti Zambrano        :  1945        Date of Surgery:  2023          SURGEON:  Edmund Echols MD           ASSISTANT:  Maria Isabel Price NP (First Assistant)          PREOPERATIVE DIAGNOSIS: 1.  Chronic Renal Failure        2.  Failed PD Catheter       3.  Ventral Hernia          POSTOPERATIVE DIAGNOSIS:   1.  Chronic Renal Failure        2.  Failed PD Catheter           OPERATIONS: 1.   Laparoscopic Peritoneal Dialysis Catheter removal                Anesthesia:  General endotracheal anesthesia      Estimated blood loss:  minimal        Blood administered:  None      Lap and instrument counts correct x 2 at the end of the case.       Specimen:  Peritoneal Dialysis Catheter            INDICATIONS/SIGNIFICANT HISTORY:  The patient is a 78 y.o. y.o. year old female who was referred for evaluation of peritoneal dialysis catheter removal and ventral hernia.  Risks and Benefits of surgery was discussed with the patient, who voiced understanding of risks and benefits and elected to proceed with surgery.           PROCEDURE IN DETAIL:  Once informed consents were obtained, the patient was taken to the operating room and placed supine on the operating table.  After general endotracheal anesthesia was induced, the abdomen was prepped and draped in a standard surgical fashion.  A 5mm incision was made in the Left upper and a Visiport trocar was used to enter the abdominal cavity.  Pneumoperitoneum was created with insufflation.  After adequate insufflation, the 5mm scope was used to visualized the intra-abdominal compartment.  The patient had significant adhesions intra-abdominally from previous surgeries.  The adhesions were wrapped around the catheter.  The catheter suture was removed and the catheter was freed. Upon looking at the abdominal wall the patient appeared to have previous mesh placement and appears that's the sensation the patient was feeling for her presumed hernia.  No apparent  defects were appreciated.  No bleeding was appreciated.  The scope and port was then removed along with insufflation.  A skin incision was made over the catheter cuffs and freed circumferentially.  The catheter was then easily removed and passed off the table as a specimen.  The skin was closed with a 4-0 Vicryl suture in an interrupted fashion.  The patient tolerated the procedure well and was transported to recovery room in good condition.

## 2023-10-23 NOTE — PROGRESS NOTES
Post Operative Progress Note  General Surgery    Patient Name: Deepti Zambrano  YOB: 1945    Date: 10/23/2023                   SUBJECTIVE:     Chief Complaint/Reason for Admission:   Chief Complaint   Patient presents with    Post-op Evaluation     Post op ventral hernia repair and PD cath removal 9/28/23        History of Present Illness:  Ms. Deepti Zambrano is a 78 y.o. female who is 2 weeks post op PD catheter removal. She is on HD Tues/Sat. The patient is currently without any complaints.     Review of Systems:  12 point ROS negative except as stated in HPI    PAST HISTORY:     Past Medical History:   Diagnosis Date    Anesthesia complication     trouble awaking    CAD (coronary artery disease)     CORONARY STENT    Chronic kidney disease, unspecified     COPD (chronic obstructive pulmonary disease)     COVID-19     week of Mothers Day 2023    Dialysis patient     CSI tues AND sat  chair time 0630    GERD (gastroesophageal reflux disease)     GI bleed     Gout     Hx of bladder cancer     Hypertension     Kidney cancer, primary, with metastasis from kidney to other site, right     Muscle weakness of extremity     bilateral lower    Post-COVID chronic cough     Sciatic nerve pain     SOB (shortness of breath) on exertion     Stroke     1997, small memory loss with  left sided weakness     Past Surgical History:   Procedure Laterality Date    AV FISTULA PLACEMENT Left     BACK SURGERY      CATARACT EXTRACTION W/  INTRAOCULAR LENS IMPLANT Bilateral     CHOLECYSTECTOMY      COLONOSCOPY      CORONARY ANGIOPLASTY WITH STENT PLACEMENT      Dr. Strickland 18 years ago    DIAGNOSTIC LAPAROSCOPY N/A 07/28/2022    Procedure: LAPAROSCOPY, DIAGNOSTIC;  Surgeon: Edmund Echols Jr., MD;  Location: [facility] OR;  Service: General;  Laterality: N/A;    DIAGNOSTIC LAPAROSCOPY N/A 05/30/2023    Procedure: LAPAROSCOPY, DIAGNOSTIC;  Surgeon: Edmund Echols Jr., MD;  Location: [facility] OR;  Service: General;   Laterality: N/A;    ESOPHAGOGASTRODUODENOSCOPY N/A 08/25/2023    Procedure: EGD;  Surgeon: Elan Leigh MD;  Location: [facility] ENDOSCOPY;  Service: Gastroenterology;  Laterality: N/A;    HYSTERECTOMY      LAPAROSCOPIC REVISION OF PROCEDURE INVOLVING PERITONEAL DIALYSIS CATHETER  05/30/2023    Procedure: REVISION OF PROCEDURE INVOLVING PERITONEAL DIALYSIS CATHETER, LAPAROSCOPIC;  Surgeon: Edmund Echols Jr., MD;  Location: [facility] OR;  Service: General;;    NECK SURGERY      cervical    PERITONEAL CATHETER INSERTION      REMOVAL, CATHETER, DIALYSIS, PERITONEAL, LAPAROSCOPIC N/A 9/28/2023    Procedure: REMOVAL, CATHETER, DIALYSIS, PERITONEAL, LAPAROSCOPIC;  Surgeon: Edmund Echols Jr., MD;  Location: [facility] OR;  Service: General;  Laterality: N/A;  LAP VENTRAL HERNIA REPAIR NOT PERFORMED DUE TO MESH IN PLACE, NOT NEEDED     right kidney removed Right     TONSILLECTOMY       Family History   Problem Relation Age of Onset    Cancer Mother     Heart attack Father      Social History     Socioeconomic History    Marital status:    Tobacco Use    Smoking status: Every Day     Current packs/day: 1.00     Types: Cigarettes, Vaping with nicotine     Start date: 2022    Smokeless tobacco: Never   Substance and Sexual Activity    Alcohol use: Yes     Alcohol/week: 2.0 standard drinks of alcohol     Types: 1 Glasses of wine, 1 Cans of beer per week     Comment: occasionally    Drug use: Never    Sexual activity: Not Currently       MEDICATIONS & ALLERGIES:     Current Outpatient Medications on File Prior to Visit   Medication Sig    allopurinoL (ZYLOPRIM) 100 MG tablet Take 100 mg by mouth nightly.    amitriptyline (ELAVIL) 25 MG tablet Take 25 mg by mouth every evening.    amLODIPine (NORVASC) 10 MG tablet Take 10 mg by mouth nightly.    atorvastatin (LIPITOR) 40 MG tablet Take 40 mg by mouth every evening.    clopidogreL (PLAVIX) 75 mg tablet Take 75 mg by mouth once daily.    docusate sodium (COLACE) 50 MG  "capsule Take 150 mg by mouth every evening.    folic acid-B demm-P-ftbnx-zinc (DIALYVITE 3000) 3-70-15 mg-mcg-mg Tab Take by mouth once daily.    furosemide (LASIX) 80 MG tablet Take 40 mg by mouth once daily.    hydrALAZINE (APRESOLINE) 50 MG tablet Take 50 mg by mouth every evening.    magnesium oxide (MAG-OX) 400 mg (241.3 mg magnesium) tablet Take 400 mg by mouth 2 (two) times daily.    metoprolol succinate (TOPROL-XL) 50 MG 24 hr tablet Take 50 mg by mouth once daily.    pantoprazole (PROTONIX) 40 MG tablet Take 40 mg by mouth nightly.    sodium bicarbonate 650 MG tablet Take 650 mg by mouth every evening.    traMADoL (ULTRAM) 50 mg tablet Take 1 tablet (50 mg total) by mouth every 6 (six) hours as needed for Pain.    traZODone (DESYREL) 50 MG tablet Take 50 mg by mouth every evening.    valsartan (DIOVAN) 160 MG tablet Take 160 mg by mouth once daily.     No current facility-administered medications on file prior to visit.     Review of patient's allergies indicates:   Allergen Reactions    Penicillins Itching and Hallucinations    Ciprofloxacin Itching    Codeine Itching       OBJECTIVE:   Visit Vitals  /70   Pulse 74   Temp 98.8 °F (37.1 °C)   Ht 5' 1" (1.549 m)   Wt 55.3 kg (122 lb)   BMI 23.05 kg/m²       Physical Exam:  General:  Well developed, well nourished, no acute distress  GI:  Abdomen soft, non-tender, non-distended, normoactive bowel sounds, no masses  Skin:  Incision Clean/Dry/Intact    VISIT DIAGNOSES:       ICD-10-CM ICD-9-CM   1. Post-operative state  Z98.890 V45.89   2. ESRD (end stage renal disease) on dialysis  N18.6 585.6    Z99.2 V45.11       ASSESSMENT/PLAN:     78 y.o. female who is s/p PD catheter removal     -  Pt doing well  -  Wounds healing well    RTC PRN        "

## 2023-11-22 NOTE — TRANSFER OF CARE
"Anesthesia Transfer of Care Note    Patient: Deepti Zambrano    Procedure(s) Performed: Procedure(s) (LRB):  DOUBLE (N/A)  COLON (N/A)  COLONOSCOPY, THROUGH STOMA, WITH HEMORRHAGE CONTROL    Patient location: GI    Anesthesia Type: general    Transport from OR: Transported from OR on room air with adequate spontaneous ventilation    Post pain: adequate analgesia    Post assessment: no apparent anesthetic complications    Post vital signs: stable    Level of consciousness: awake    Nausea/Vomiting: no nausea/vomiting    Complications: none    Transfer of care protocol was followed      Last vitals: Visit Vitals  /68 (BP Location: Right leg, Patient Position: Lying)   Pulse 76   Temp 36 °C (96.8 °F)   Resp 20   Ht 5' 1" (1.549 m)   Wt 55.8 kg (123 lb)   SpO2 95%   Breastfeeding No   BMI 23.24 kg/m²     "

## 2023-11-22 NOTE — PROVATION PATIENT INSTRUCTIONS
Discharge Summary/Instructions after an Endoscopic Procedure  Patient Name: Deepti Zambrano  Patient MRN: [id number]  Patient YOB: 1945 Wednesday, November 22, 2023  German Cottrell MD  Dear patient,  As a result of recent federal legislation (The Federal Cures Act), you may   receive lab or pathology results from your procedure in your [facility]   account before your physician is able to contact you. Your physician or   their representative will relay the results to you with their   recommendations at their soonest availability.  Thank you,  RESTRICTIONS:  During your procedure today, you received medications for sedation.  These   medications may affect your judgment, balance and coordination.  Therefore,   for 24 hours, you have the following restrictions:   - DO NOT drive a car, operate machinery, make legal/financial decisions,   sign important papers or drink alcohol.    ACTIVITY:  Today: no heavy lifting, straining or running due to procedural   sedation/anesthesia.  The following day: return to full activity including work.  DIET:  Eat and drink normally unless instructed otherwise.     TREATMENT FOR COMMON SIDE EFFECTS:  - Mild abdominal pain, nausea, belching, bloating or excessive gas:  rest,   eat lightly and use a heating pad.  - Sore Throat: treat with throat lozenges and/or gargle with warm salt   water.  - Because air was used during the procedure, expelling large amounts of air   from your rectum or belching is normal.  - If a bowel prep was taken, you may not have a bowel movement for 1-3 days.    This is normal.  SYMPTOMS TO WATCH FOR AND REPORT TO YOUR PHYSICIAN:  1. Abdominal pain or bloating, other than gas cramps.  2. Chest pain.  3. Back pain.  4. Signs of infection such as: chills or fever occurring within 24 hours   after the procedure.  5. Rectal bleeding, which would show as bright red, maroon, or black stools.   (A tablespoon of blood from the rectum is not serious,  especially if   hemorrhoids are present.)  6. Vomiting.  7. Weakness or dizziness.  GO DIRECTLY TO THE NEAREST EMERGENCY ROOM IF YOU HAVE ANY OF THE FOLLOWING:      Difficulty breathing              Chills and/or fever over 101 F   Persistent vomiting and/or vomiting blood   Severe abdominal pain   Severe chest pain   Black, tarry stools   Bleeding- more than one tablespoon   Any other symptom or condition that you feel may need urgent attention  Your doctor recommends these additional instructions:  If any biopsies were taken, your doctors clinic will contact you in 1 to 2   weeks with any results.  - Discharge patient to home.   - Resume previous diet.   - Continue present medications.   - Await pathology results.   - Return to GI clinic in 2months  For questions, problems or results please call your physician - German Cottrell MD at Work:  [phone].  [facility], EMERGENCY ROOM PHONE NUMBER: [phone]  IF A COMPLICATION OR EMERGENCY SITUATION ARISES AND YOU ARE UNABLE TO REACH   YOUR PHYSICIAN - GO DIRECTLY TO THE EMERGENCY ROOM.  MD German Smith MD  11/22/2023 1:32:07 PM  This report has been verified and signed electronically.  Dear patient,  As a result of recent federal legislation (The Federal Cures Act), you may   receive lab or pathology results from your procedure in your [facility]   account before your physician is able to contact you. Your physician or   their representative will relay the results to you with their   recommendations at their soonest availability.  Thank you,  PROVATION

## 2023-11-22 NOTE — ANESTHESIA POSTPROCEDURE EVALUATION
Anesthesia Post Evaluation    Patient: Deepti Zambrano    Procedure(s) Performed: Procedure(s) (LRB):  DOUBLE (N/A)  COLON (N/A)  COLONOSCOPY, THROUGH STOMA, WITH HEMORRHAGE CONTROL    Final Anesthesia Type: general      Patient location during evaluation: PACU  Patient participation: Yes- Able to Participate  Level of consciousness: sedated and responds to stimulation  Post-procedure vital signs: reviewed and stable  Pain management: adequate  Airway patency: patent    PONV status at discharge: No PONV  Anesthetic complications: no      Cardiovascular status: hemodynamically stable  Respiratory status: unassisted  Hydration status: euvolemic  Follow-up not needed.          Vitals Value Taken Time   /68 11/22/23 1328   Temp 36 °C (96.8 °F) 11/22/23 1328   Pulse 76 11/22/23 1328   Resp 20 11/22/23 1328   SpO2 95 % 11/22/23 1328         No case tracking events are documented in the log.      Pain/Caroline Score: No data recorded

## 2023-11-22 NOTE — ANESTHESIA PREPROCEDURE EVALUATION
11/22/2023  Deepti Zambrano is a 78 y.o., female with ----------------------------  Acid reflux  Anesthesia complication      Comment:  trouble awaking  CAD (coronary artery disease)      Comment:  CORONARY STENT  Chronic kidney disease, unspecified  COPD (chronic obstructive pulmonary disease)  COVID-19      Comment:  week of Mothers Day 2023  Dialysis patient      Comment:  CSI tues AND sat  chair time 0630  Epigastric pain  GERD (gastroesophageal reflux disease)  GI bleed  Gout  High cholesterol  Hx of bladder cancer  Hypertension  Kidney cancer, primary, with metastasis from kidney to other site,   right  Melena  Muscle weakness of extremity      Comment:  bilateral lower  Pneumonia  Post-COVID chronic cough  Sciatic nerve pain  SOB (shortness of breath) on exertion  Stroke      Comment:  1997, small memory loss with  left sided weakness    And ----------------------------  Av fistula placement  Back surgery  Bcg for cancer  Cataract extraction w/  intraocular lens implant  Cholecystectomy  Colonoscopy  Coronary angioplasty with stent placement      Comment:  Dr. Strickland 18 years ago  Diagnostic laparoscopy      Comment:  Procedure: LAPAROSCOPY, DIAGNOSTIC;  Surgeon: Edmund Echols Jr., MD;  Location: [facility] OR;  Service: General;                 Laterality: N/A;  Diagnostic laparoscopy      Comment:  Procedure: LAPAROSCOPY, DIAGNOSTIC;  Surgeon: Edmund Echols Jr., MD;  Location: [facility] OR;  Service: General;                 Laterality: N/A;  Esophagogastroduodenoscopy      Comment:  Procedure: EGD;  Surgeon: Elan Leigh MD;                 Location: [facility] ENDOSCOPY;  Service:                Gastroenterology;  Laterality: N/A;  Hernia repair  Hysterectomy  Laparoscopic revision of procedure involving peritoneal dialysis   catheter      Comment:  Procedure:  REVISION OF PROCEDURE INVOLVING PERITONEAL                DIALYSIS CATHETER, LAPAROSCOPIC;  Surgeon: Edmund Echols Jr., MD;  Location: [facility] OR;  Service: General;;  Neck surgery      Comment:  cervical  Peritoneal catheter insertion  Removal, catheter, dialysis, peritoneal, laparoscopic      Comment:  Procedure: REMOVAL, CATHETER, DIALYSIS, PERITONEAL,                LAPAROSCOPIC;  Surgeon: Edmund Echols Jr., MD;                 Location: [facility] OR;  Service: General;  Laterality: N/A;                 LAP VENTRAL HERNIA REPAIR NOT PERFORMED DUE TO MESH IN                PLACE, NOT NEEDED   Right kidney removed  Tonsillectomy    Presents for EGD/Colonoscopy.  Several general anesthetics but I was the anesthesiologist for previous EGD in Aug and there were no noted anesthetic issues  Last HD was yesterday and today's K+ is 3.1  Pre-op Assessment    I have reviewed the NPO Status.      Review of Systems     Latest Reference Range & Units 08/25/23 05:39 08/25/23 05:43   WBC 4.50 - 11.50 x10(3)/mcL  9.73   Hemoglobin 12.0 - 16.0 g/dL  10.9 (L)   Hematocrit 37.0 - 47.0 %  33.7 (L)   Platelet Count 130 - 400 x10(3)/mcL  204   Sodium 136 - 145 mmol/L 143    Potassium 3.5 - 5.1 mmol/L 4.4    Chloride 98 - 107 mmol/L 110 (H)    CO2 23 - 31 mmol/L 22 (L)    BUN 9.8 - 20.1 mg/dL 43.2 (H)    Creatinine 0.55 - 1.02 mg/dL 4.37 (H)    eGFR mls/min/1.73/m2 10    Glucose 82 - 115 mg/dL 90    (L): Data is abnormally low  (H): Data is abnormally high    Physical Exam  General: Well nourished, Cooperative, Alert and Oriented    Airway:  Mallampati: II   Mouth Opening: Normal  TM Distance: Normal  Tongue: Normal  Neck ROM: Normal ROM    Dental:  Dentures  To be removed  Chest/Lungs:  Clear to auscultation, Normal Respiratory Rate    Heart:  Rate: Normal  Rhythm: Regular Rhythm    Musculoskeletal:  Dialysis access LUE  Skin:  Yosef complexion, ecchymosis on arms      Anesthesia Plan  Type of Anesthesia, risks &  benefits discussed:    Anesthesia Type: Gen Natural Airway  Intra-op Monitoring Plan: Standard ASA Monitors  Post Op Pain Control Plan: IV/PO Opioids PRN  Induction:  IV  Airway Plan: Direct  Informed Consent: Informed consent signed with the Patient and all parties understand the risks and agree with anesthesia plan.  All questions answered. Patient consented to blood products? No  ASA Score: 3  Day of Surgery Review of History & Physical: H&P Update referred to the surgeon/provider.  Anesthesia Plan Notes: Nasal cannula vs facemask supplemental oxygenation   For patients with THUY/obesity, may consider SuperNoval Nasal CPAP      Ready For Surgery From Anesthesia Perspective.     .

## 2023-11-22 NOTE — PROVATION PATIENT INSTRUCTIONS
Discharge Summary/Instructions after an Endoscopic Procedure  Patient Name: Deepti Zambrano  Patient MRN: [id number]  Patient YOB: 1945 Wednesday, November 22, 2023  German Cottrell MD  Dear patient,  As a result of recent federal legislation (The Federal Cures Act), you may   receive lab or pathology results from your procedure in your [facility]   account before your physician is able to contact you. Your physician or   their representative will relay the results to you with their   recommendations at their soonest availability.  Thank you,  RESTRICTIONS:  During your procedure today, you received medications for sedation.  These   medications may affect your judgment, balance and coordination.  Therefore,   for 24 hours, you have the following restrictions:   - DO NOT drive a car, operate machinery, make legal/financial decisions,   sign important papers or drink alcohol.    ACTIVITY:  Today: no heavy lifting, straining or running due to procedural   sedation/anesthesia.  The following day: return to full activity including work.  DIET:  Eat and drink normally unless instructed otherwise.     TREATMENT FOR COMMON SIDE EFFECTS:  - Mild abdominal pain, nausea, belching, bloating or excessive gas:  rest,   eat lightly and use a heating pad.  - Sore Throat: treat with throat lozenges and/or gargle with warm salt   water.  - Because air was used during the procedure, expelling large amounts of air   from your rectum or belching is normal.  - If a bowel prep was taken, you may not have a bowel movement for 1-3 days.    This is normal.  SYMPTOMS TO WATCH FOR AND REPORT TO YOUR PHYSICIAN:  1. Abdominal pain or bloating, other than gas cramps.  2. Chest pain.  3. Back pain.  4. Signs of infection such as: chills or fever occurring within 24 hours   after the procedure.  5. Rectal bleeding, which would show as bright red, maroon, or black stools.   (A tablespoon of blood from the rectum is not serious,  especially if   hemorrhoids are present.)  6. Vomiting.  7. Weakness or dizziness.  GO DIRECTLY TO THE NEAREST EMERGENCY ROOM IF YOU HAVE ANY OF THE FOLLOWING:      Difficulty breathing              Chills and/or fever over 101 F   Persistent vomiting and/or vomiting blood   Severe abdominal pain   Severe chest pain   Black, tarry stools   Bleeding- more than one tablespoon   Any other symptom or condition that you feel may need urgent attention  Your doctor recommends these additional instructions:  If any biopsies were taken, your doctors clinic will contact you in 1 to 2   weeks with any results.  - Discharge patient to home.   - Resume previous diet.   - Continue present medications.   - Await pathology results.   - no further colonoscopy needed for colon cancer screening secondary to age   and current guidelines  For questions, problems or results please call your physician - German Cottrell MD at Work:  [phone].  [facility], EMERGENCY ROOM PHONE NUMBER: [phone]  IF A COMPLICATION OR EMERGENCY SITUATION ARISES AND YOU ARE UNABLE TO REACH   YOUR PHYSICIAN - GO DIRECTLY TO THE EMERGENCY ROOM.  MD German Smith MD  11/22/2023 1:28:59 PM  This report has been verified and signed electronically.  Dear patient,  As a result of recent federal legislation (The Federal Cures Act), you may   receive lab or pathology results from your procedure in your [facility]   account before your physician is able to contact you. Your physician or   their representative will relay the results to you with their   recommendations at their soonest availability.  Thank you,  PROVATION

## 2023-11-27 PROBLEM — K92.2 GASTROINTESTINAL HEMORRHAGE: Status: RESOLVED | Noted: 2023-01-01 | Resolved: 2023-01-01

## 2023-11-29 NOTE — H&P
[facility] Endoscopy  Gastroenterology  H&P    Patient Name: Deepti Zambrano  MRN: [id number]  Admission Date: 11/22/2023  Code Status: Prior    Attending Provider: No att. providers found   Primary Care Physician: Saleem Juan MD  Principal Problem:<principal problem not specified>    Subjective:     History of Present Illness: gi  bleeding/melena and personal history of colon polyps. Plan--proceed w egd/colonoscopy     Past Medical History:   Diagnosis Date    Acid reflux     Anesthesia complication     trouble awaking    CAD (coronary artery disease)     CORONARY STENT    Chronic kidney disease, unspecified     COPD (chronic obstructive pulmonary disease)     COVID-19     week of Mothers Day 2023    Dialysis patient     CSI tues AND sat  chair time 0630    Epigastric pain     GERD (gastroesophageal reflux disease)     GI bleed     Gout     High cholesterol     Hx of bladder cancer     Hypertension     Kidney cancer, primary, with metastasis from kidney to other site, right     Melena     Muscle weakness of extremity     bilateral lower    Pneumonia     Post-COVID chronic cough     Sciatic nerve pain     SOB (shortness of breath) on exertion     Stroke     1997, small memory loss with  left sided weakness       Past Surgical History:   Procedure Laterality Date    AV FISTULA PLACEMENT Left     BACK SURGERY      BCG for cancer      CATARACT EXTRACTION W/  INTRAOCULAR LENS IMPLANT Bilateral     CHOLECYSTECTOMY      COLONOSCOPY      COLONOSCOPY, THROUGH STOMA, WITH HEMORRHAGE CONTROL  11/22/2023    Procedure: COLONOSCOPY, THROUGH STOMA, WITH HEMORRHAGE CONTROL;  Surgeon: German Cottrell MD;  Location: [facility] ENDOSCOPY;  Service: Gastroenterology;;    COLONOSCOPY, WITH POLYPECTOMY USING SNARE N/A 11/22/2023    Procedure: COLON;  Surgeon: German Cottrell MD;  Location: [facility] ENDOSCOPY;  Service: Gastroenterology;  Laterality: N/A;    CORONARY ANGIOPLASTY WITH  STENT PLACEMENT      Dr. Strickland 18 years ago    DIAGNOSTIC LAPAROSCOPY N/A 07/28/2022    Procedure: LAPAROSCOPY, DIAGNOSTIC;  Surgeon: Edmund Echols Jr., MD;  Location: [facility] OR;  Service: General;  Laterality: N/A;    DIAGNOSTIC LAPAROSCOPY N/A 05/30/2023    Procedure: LAPAROSCOPY, DIAGNOSTIC;  Surgeon: Edmund Echols Jr., MD;  Location: [facility] OR;  Service: General;  Laterality: N/A;    ESOPHAGOGASTRODUODENOSCOPY N/A 08/25/2023    Procedure: EGD;  Surgeon: Elan Leigh MD;  Location: [facility] ENDOSCOPY;  Service: Gastroenterology;  Laterality: N/A;    ESOPHAGOGASTRODUODENOSCOPY N/A 11/22/2023    Procedure: DOUBLE;  Surgeon: German Cottrell MD;  Location: [facility] ENDOSCOPY;  Service: Gastroenterology;  Laterality: N/A;    HERNIA REPAIR      HYSTERECTOMY      LAPAROSCOPIC REVISION OF PROCEDURE INVOLVING PERITONEAL DIALYSIS CATHETER  05/30/2023    Procedure: REVISION OF PROCEDURE INVOLVING PERITONEAL DIALYSIS CATHETER, LAPAROSCOPIC;  Surgeon: Edmund Echols Jr., MD;  Location: [facility];  Service: General;;    NECK SURGERY      cervical    PERITONEAL CATHETER INSERTION      REMOVAL, CATHETER, DIALYSIS, PERITONEAL, LAPAROSCOPIC N/A 09/28/2023    Procedure: REMOVAL, CATHETER, DIALYSIS, PERITONEAL, LAPAROSCOPIC;  Surgeon: Edmund Echols Jr., MD;  Location: [facility];  Service: General;  Laterality: N/A;  LAP VENTRAL HERNIA REPAIR NOT PERFORMED DUE TO MESH IN PLACE, NOT NEEDED     right kidney removed Right     TONSILLECTOMY         Review of patient's allergies indicates:   Allergen Reactions    Penicillins Itching and Hallucinations    Ciprofloxacin Itching    Codeine Itching     Family History       Problem Relation (Age of Onset)    Cancer Mother    Heart attack Father          Tobacco Use    Smoking status: Every Day     Current packs/day: 1.00     Types: Cigarettes, Vaping with nicotine     Start date: 2022    Smokeless tobacco: Never   Substance and Sexual Activity    Alcohol use: Not  Currently     Alcohol/week: 2.0 standard drinks of alcohol     Types: 1 Glasses of wine, 1 Cans of beer per week     Comment: occasionally    Drug use: Never    Sexual activity: Not Currently     Review of Systems  Objective:     Vital Signs (Most Recent):  Temp: 96.8 °F (36 °C) (11/22/23 1328)  Pulse: 83 (11/22/23 1357)  Resp: 20 (11/22/23 1357)  BP: 136/74 (11/22/23 1357)  SpO2: 96 % (11/22/23 1357) Vital Signs (24h Range):        Weight: 55.8 kg (123 lb) (11/22/23 1011)  Body mass index is 23.24 kg/m².    No intake or output data in the 24 hours ending 11/29/23 1615    Lines/Drains/Airways       Peripheral Intravenous Line  Duration                  Hemodialysis AV Fistula 07/01/21 0800 Left upper arm 881 days                    Physical Exam    Significant Labs:  All pertinent lab results from the last 24 hours have been reviewed.    Significant Imaging:  Imaging results within the past 24 hours have been reviewed.    Assessment/Plan:     There are no hospital problems to display for this patient.      gi  bleeding/melena and personal history of colon polyps. Plan--proceed w egd/colonoscopy       German Cottrell MD  Gastroenterology  [facility]

## 2024-01-01 ENCOUNTER — TELEPHONE (OUTPATIENT)
Dept: CARDIOLOGY | Facility: HOSPITAL | Age: 79
End: 2024-01-01
Payer: MEDICARE

## 2024-01-01 ENCOUNTER — HOSPITAL ENCOUNTER (INPATIENT)
Facility: HOSPITAL | Age: 79
LOS: 4 days | DRG: 335 | End: 2024-03-24
Attending: INTERNAL MEDICINE | Admitting: INTERNAL MEDICINE
Payer: MEDICARE

## 2024-01-01 ENCOUNTER — ANESTHESIA EVENT (OUTPATIENT)
Dept: SURGERY | Facility: HOSPITAL | Age: 79
DRG: 335 | End: 2024-01-01
Payer: MEDICARE

## 2024-01-01 ENCOUNTER — HOSPITAL ENCOUNTER (OUTPATIENT)
Facility: HOSPITAL | Age: 79
Discharge: HOME | End: 2024-02-05
Attending: STUDENT IN AN ORGANIZED HEALTH CARE EDUCATION/TRAINING PROGRAM | Admitting: STUDENT IN AN ORGANIZED HEALTH CARE EDUCATION/TRAINING PROGRAM
Payer: MEDICARE

## 2024-01-01 ENCOUNTER — ANESTHESIA (OUTPATIENT)
Dept: SURGERY | Facility: HOSPITAL | Age: 79
DRG: 335 | End: 2024-01-01
Payer: MEDICARE

## 2024-01-01 VITALS
HEART RATE: 88 BPM | WEIGHT: 113.13 LBS | DIASTOLIC BLOOD PRESSURE: 41 MMHG | RESPIRATION RATE: 30 BRPM | HEIGHT: 61 IN | SYSTOLIC BLOOD PRESSURE: 77 MMHG | OXYGEN SATURATION: 92 % | TEMPERATURE: 98 F | BODY MASS INDEX: 21.36 KG/M2

## 2024-01-01 VITALS
HEIGHT: 61 IN | WEIGHT: 116 LBS | BODY MASS INDEX: 21.9 KG/M2 | TEMPERATURE: 98 F | DIASTOLIC BLOOD PRESSURE: 59 MMHG | HEART RATE: 86 BPM | SYSTOLIC BLOOD PRESSURE: 138 MMHG | OXYGEN SATURATION: 98 %

## 2024-01-01 DIAGNOSIS — I50.9 CHF (CONGESTIVE HEART FAILURE): ICD-10-CM

## 2024-01-01 DIAGNOSIS — I48.91 A-FIB: ICD-10-CM

## 2024-01-01 DIAGNOSIS — R00.0 TACHYCARDIA: ICD-10-CM

## 2024-01-01 DIAGNOSIS — K56.609 SMALL BOWEL OBSTRUCTION: ICD-10-CM

## 2024-01-01 DIAGNOSIS — T82.590A MECHANICAL COMPLICATION OF ARTERIOVENOUS FISTULA SURGICALLY CREATED, INITIAL ENCOUNTER: ICD-10-CM

## 2024-01-01 DIAGNOSIS — I25.10 CAD (CORONARY ARTERY DISEASE): ICD-10-CM

## 2024-01-01 DIAGNOSIS — R07.9 CHEST PAIN: ICD-10-CM

## 2024-01-01 LAB
ABS NEUT (OLG): 0.08 X10(3)/MCL (ref 2.1–9.2)
ABS NEUT (OLG): 1.15 X10(3)/MCL (ref 2.1–9.2)
ABS NEUT (OLG): ABNORMAL
ACINETOBACTER CALCOACETICUS-BAUMANNII COMPLEX (OHS): NOT DETECTED
ALBUMIN SERPL-MCNC: 2.3 G/DL (ref 3.4–4.8)
ALBUMIN SERPL-MCNC: 2.5 G/DL (ref 3.4–4.8)
ALBUMIN SERPL-MCNC: 3 G/DL (ref 3.4–4.8)
ALBUMIN SERPL-MCNC: 3 G/DL (ref 3.4–4.8)
ALBUMIN SERPL-MCNC: 3.2 G/DL (ref 3.4–4.8)
ALBUMIN SERPL-MCNC: 4.1 G/DL (ref 3.4–4.8)
ALBUMIN/GLOB SERPL: 0.9 RATIO (ref 1.1–2)
ALBUMIN/GLOB SERPL: 1.2 RATIO (ref 1.1–2)
ALBUMIN/GLOB SERPL: 1.2 RATIO (ref 1.1–2)
ALBUMIN/GLOB SERPL: 1.4 RATIO (ref 1.1–2)
ALLENS TEST BLOOD GAS (OHS): ABNORMAL
ALLENS TEST BLOOD GAS (OHS): YES
ALP SERPL-CCNC: 52 UNIT/L (ref 40–150)
ALP SERPL-CCNC: 62 UNIT/L (ref 40–150)
ALP SERPL-CCNC: 73 UNIT/L (ref 40–150)
ALP SERPL-CCNC: 92 UNIT/L (ref 40–150)
ALT SERPL-CCNC: 14 UNIT/L (ref 0–55)
ALT SERPL-CCNC: 18 UNIT/L (ref 0–55)
ALT SERPL-CCNC: 30 UNIT/L (ref 0–55)
ALT SERPL-CCNC: 42 UNIT/L (ref 0–55)
ANISOCYTOSIS BLD QL SMEAR: ABNORMAL
AORTIC ROOT ANNULUS: 2.5 CM
APPEARANCE UR: CLEAR
APTT PPP: 38.8 SECONDS (ref 23.2–33.7)
APTT PPP: 39.8 SECONDS (ref 23.2–33.7)
AST SERPL-CCNC: 19 UNIT/L (ref 5–34)
AST SERPL-CCNC: 28 UNIT/L (ref 5–34)
AST SERPL-CCNC: 46 UNIT/L (ref 5–34)
AST SERPL-CCNC: 60 UNIT/L (ref 5–34)
AV INDEX (PROSTH): 0.57
AV MEAN GRADIENT: 10 MMHG
AV PEAK GRADIENT: 18 MMHG
AV VALVE AREA BY VELOCITY RATIO: 1.6 CM²
AV VALVE AREA: 1.6 CM²
AV VELOCITY RATIO: 0.56
BACTERIA #/AREA URNS AUTO: ABNORMAL /HPF
BACTEROIDES FRAGILIS (OHS): NOT DETECTED
BASE EXCESS BLD CALC-SCNC: -10.1 MMOL/L (ref -2–2)
BASE EXCESS BLD CALC-SCNC: -10.8 MMOL/L
BASE EXCESS BLD CALC-SCNC: -18.5 MMOL/L
BASE EXCESS BLD CALC-SCNC: -9.1 MMOL/L (ref -2–2)
BASE EXCESS BLD CALC-SCNC: -9.1 MMOL/L (ref -2–2)
BASOPHILS # BLD AUTO: 0.05 X10(3)/MCL
BASOPHILS # BLD AUTO: 0.05 X10(3)/MCL
BASOPHILS # BLD AUTO: 0.07 X10(3)/MCL
BASOPHILS NFR BLD AUTO: 0.4 %
BASOPHILS NFR BLD AUTO: 0.5 %
BASOPHILS NFR BLD AUTO: 0.9 %
BILIRUB SERPL-MCNC: 0.7 MG/DL
BILIRUB SERPL-MCNC: 0.8 MG/DL
BILIRUB UR QL STRIP.AUTO: NEGATIVE
BLOOD GAS SAMPLE TYPE (OHS): ABNORMAL
BSA FOR ECHO PROCEDURE: 1.49 M2
BUN SERPL-MCNC: 34.1 MG/DL (ref 9.8–20.1)
BUN SERPL-MCNC: 38.1 MG/DL (ref 9.8–20.1)
BUN SERPL-MCNC: 59.9 MG/DL (ref 9.8–20.1)
BUN SERPL-MCNC: 70.8 MG/DL (ref 9.8–20.1)
BUN SERPL-MCNC: 71.4 MG/DL (ref 9.8–20.1)
BUN SERPL-MCNC: 75.3 MG/DL (ref 9.8–20.1)
BURR CELLS (OLG): ABNORMAL
BURR CELLS (OLG): ABNORMAL
C AURIS DNA BLD POS QL NAA+NON-PROBE: NOT DETECTED
C GATTII+NEOFOR DNA CSF QL NAA+NON-PROBE: NOT DETECTED
CA-I BLD-SCNC: 1.01 MMOL/L (ref 1.12–1.23)
CA-I BLD-SCNC: 1.02 MMOL/L (ref 1.12–1.23)
CA-I BLD-SCNC: 1.05 MMOL/L (ref 1.12–1.23)
CA-I BLD-SCNC: 1.05 MMOL/L (ref 1.12–1.23)
CA-I BLD-SCNC: 1.12 MMOL/L (ref 1.12–1.23)
CALCIUM SERPL-MCNC: 10.4 MG/DL (ref 8.4–10.2)
CALCIUM SERPL-MCNC: 7.2 MG/DL (ref 8.4–10.2)
CALCIUM SERPL-MCNC: 7.8 MG/DL (ref 8.4–10.2)
CALCIUM SERPL-MCNC: 7.9 MG/DL (ref 8.4–10.2)
CALCIUM SERPL-MCNC: 8.6 MG/DL (ref 8.4–10.2)
CALCIUM SERPL-MCNC: 9.4 MG/DL (ref 8.4–10.2)
CANDIDA ALBICANS (OHS): NOT DETECTED
CANDIDA GLABRATA (OHS): NOT DETECTED
CANDIDA KRUSEI (OHS): NOT DETECTED
CANDIDA PARAPSILOSIS (OHS): NOT DETECTED
CANDIDA TROPICALIS (OHS): NOT DETECTED
CHLORIDE SERPL-SCNC: 101 MMOL/L (ref 98–107)
CHLORIDE SERPL-SCNC: 103 MMOL/L (ref 98–107)
CHLORIDE SERPL-SCNC: 104 MMOL/L (ref 98–107)
CHLORIDE SERPL-SCNC: 107 MMOL/L (ref 98–107)
CHLORIDE SERPL-SCNC: 108 MMOL/L (ref 98–107)
CHLORIDE SERPL-SCNC: 109 MMOL/L (ref 98–107)
CK MB SERPL-MCNC: 5.9 NG/ML
CK SERPL-CCNC: 93 U/L (ref 29–168)
CO2 BLDA-SCNC: 12.9 MMOL/L
CO2 BLDA-SCNC: 18 MMOL/L
CO2 BLDA-SCNC: 19.3 MMOL/L
CO2 BLDA-SCNC: 19.8 MMOL/L
CO2 BLDA-SCNC: 19.8 MMOL/L
CO2 SERPL-SCNC: 13 MMOL/L (ref 23–31)
CO2 SERPL-SCNC: 16 MMOL/L (ref 23–31)
CO2 SERPL-SCNC: 17 MMOL/L (ref 23–31)
CO2 SERPL-SCNC: 20 MMOL/L (ref 23–31)
CO2 SERPL-SCNC: 21 MMOL/L (ref 23–31)
CO2 SERPL-SCNC: 24 MMOL/L (ref 23–31)
COHGB MFR BLDA: 0 % (ref 0.5–1.5)
COHGB MFR BLDA: 0.7 % (ref 0.5–1.5)
COHGB MFR BLDA: 2.1 % (ref 0.5–1.5)
COLOR UR AUTO: YELLOW
CREAT SERPL-MCNC: 3.8 MG/DL (ref 0.55–1.02)
CREAT SERPL-MCNC: 4.11 MG/DL (ref 0.55–1.02)
CREAT SERPL-MCNC: 5.07 MG/DL (ref 0.55–1.02)
CREAT SERPL-MCNC: 5.37 MG/DL (ref 0.55–1.02)
CREAT SERPL-MCNC: 5.38 MG/DL (ref 0.55–1.02)
CREAT SERPL-MCNC: 5.55 MG/DL (ref 0.55–1.02)
CTX-M (OHS): NOT DETECTED
CV ECHO LV RWT: 0.39 CM
DOP CALC AO PEAK VEL: 2.13 M/S
DOP CALC AO VTI: 34.3 CM
DOP CALC LVOT AREA: 2.8 CM2
DOP CALC LVOT DIAMETER: 1.9 CM
DOP CALC LVOT PEAK VEL: 1.2 M/S
DOP CALC LVOT STROKE VOLUME: 54.98 CM3
DOP CALC MV VTI: 27.6 CM
DOP CALCLVOT PEAK VEL VTI: 19.4 CM
DRAWN BY BLOOD GAS (OHS): ABNORMAL
E WAVE DECELERATION TIME: 116 MSEC
E/A RATIO: 1.23
E/E' RATIO: 13.3 M/S
ECHO LV POSTERIOR WALL: 1 CM (ref 0.6–1.1)
ENTEROBACTER CLOACAE COMPLEX (OHS): NOT DETECTED
ENTEROBACTERALES (OHS): DETECTED
ENTEROCOCCUS FAECALIS (OHS): NOT DETECTED
ENTEROCOCCUS FAECIUM (OHS): NOT DETECTED
EOSINOPHIL # BLD AUTO: 0.02 X10(3)/MCL (ref 0–0.9)
EOSINOPHIL # BLD AUTO: 0.06 X10(3)/MCL (ref 0–0.9)
EOSINOPHIL # BLD AUTO: 0.2 X10(3)/MCL (ref 0–0.9)
EOSINOPHIL NFR BLD AUTO: 0.2 %
EOSINOPHIL NFR BLD AUTO: 1.1 %
EOSINOPHIL NFR BLD AUTO: 1.5 %
EOSINOPHIL NFR BLD MANUAL: 0.02 X10(3)/MCL (ref 0–0.9)
EOSINOPHIL NFR BLD MANUAL: 0.05 X10(3)/MCL (ref 0–0.9)
EOSINOPHIL NFR BLD MANUAL: 1 %
EOSINOPHIL NFR BLD MANUAL: 4 %
EOSINOPHIL NFR BLD MANUAL: 6 %
ERYTHROCYTE [DISTWIDTH] IN BLOOD BY AUTOMATED COUNT: 18.6 % (ref 11.5–17)
ERYTHROCYTE [DISTWIDTH] IN BLOOD BY AUTOMATED COUNT: 18.6 % (ref 11.5–17)
ERYTHROCYTE [DISTWIDTH] IN BLOOD BY AUTOMATED COUNT: 18.8 % (ref 11.5–17)
ERYTHROCYTE [DISTWIDTH] IN BLOOD BY AUTOMATED COUNT: 18.9 % (ref 11.5–17)
ERYTHROCYTE [DISTWIDTH] IN BLOOD BY AUTOMATED COUNT: 18.9 % (ref 11.5–17)
ERYTHROCYTE [DISTWIDTH] IN BLOOD BY AUTOMATED COUNT: 19.1 % (ref 11.5–17)
ESCHERICHIA COLI (OHS): DETECTED
FOLATE SERPL-MCNC: 16.4 NG/ML (ref 7–31.4)
FRACTIONAL SHORTENING: 16 % (ref 28–44)
GFR SERPLBLD CREATININE-BSD FMLA CKD-EPI: 11 MLS/MIN/1.73/M2
GFR SERPLBLD CREATININE-BSD FMLA CKD-EPI: 12 MLS/MIN/1.73/M2
GFR SERPLBLD CREATININE-BSD FMLA CKD-EPI: 7 MLS/MIN/1.73/M2
GFR SERPLBLD CREATININE-BSD FMLA CKD-EPI: 8 MLS/MIN/1.73/M2
GLOBULIN SER-MCNC: 1.8 GM/DL (ref 2.4–3.5)
GLOBULIN SER-MCNC: 2.5 GM/DL (ref 2.4–3.5)
GLOBULIN SER-MCNC: 2.6 GM/DL (ref 2.4–3.5)
GLOBULIN SER-MCNC: 3.5 GM/DL (ref 2.4–3.5)
GLUCOSE SERPL-MCNC: 109 MG/DL (ref 70–110)
GLUCOSE SERPL-MCNC: 142 MG/DL (ref 82–115)
GLUCOSE SERPL-MCNC: 60 MG/DL (ref 82–115)
GLUCOSE SERPL-MCNC: 63 MG/DL (ref 82–115)
GLUCOSE SERPL-MCNC: 67 MG/DL (ref 82–115)
GLUCOSE SERPL-MCNC: 70 MG/DL (ref 82–115)
GLUCOSE SERPL-MCNC: 94 MG/DL (ref 82–115)
GLUCOSE UR QL STRIP.AUTO: ABNORMAL
GP B STREP DNA CSF QL NAA+NON-PROBE: NOT DETECTED
GROUP & RH: NORMAL
HAEM INFLU DNA CSF QL NAA+NON-PROBE: NOT DETECTED
HBV SURFACE AB SER-ACNC: 10.05 MIU/ML
HBV SURFACE AB SERPL IA-ACNC: ABNORMAL M[IU]/ML
HBV SURFACE AG SERPL QL IA: NONREACTIVE
HCO3 BLDA-SCNC: 11.6 MMOL/L (ref 22–26)
HCO3 BLDA-SCNC: 16.9 MMOL/L (ref 22–26)
HCO3 BLDA-SCNC: 17.8 MMOL/L (ref 22–26)
HCO3 BLDA-SCNC: 18.2 MMOL/L (ref 22–26)
HCO3 BLDA-SCNC: 18.4 MMOL/L (ref 22–26)
HCT VFR BLD AUTO: 30.4 % (ref 37–47)
HCT VFR BLD AUTO: 30.6 % (ref 37–47)
HCT VFR BLD AUTO: 33 % (ref 37–47)
HCT VFR BLD AUTO: 35.7 % (ref 37–47)
HCT VFR BLD AUTO: 35.8 % (ref 37–47)
HCT VFR BLD AUTO: 46.2 % (ref 37–47)
HEMATOLOGIST REVIEW: NORMAL
HGB BLD-MCNC: 10.8 G/DL (ref 12–16)
HGB BLD-MCNC: 11 G/DL (ref 12–16)
HGB BLD-MCNC: 14 G/DL (ref 12–16)
HGB BLD-MCNC: 9 G/DL (ref 12–16)
HGB BLD-MCNC: 9.2 G/DL (ref 12–16)
HGB BLD-MCNC: 9.8 G/DL (ref 12–16)
HR MV ECHO: 109 BPM
IMM GRANULOCYTES # BLD AUTO: 0.01 X10(3)/MCL (ref 0–0.04)
IMM GRANULOCYTES # BLD AUTO: 0.05 X10(3)/MCL (ref 0–0.04)
IMM GRANULOCYTES # BLD AUTO: 0.06 X10(3)/MCL (ref 0–0.04)
IMM GRANULOCYTES NFR BLD AUTO: 0.2 %
IMM GRANULOCYTES NFR BLD AUTO: 0.4 %
IMM GRANULOCYTES NFR BLD AUTO: 0.5 %
IMP (OHS): NOT DETECTED
INDIRECT COOMBS: NORMAL
INHALED O2 CONCENTRATION: 100 %
INHALED O2 CONCENTRATION: 70 %
INR PPP: 2
INSTRUMENT WBC (OLG): 1.37 X10(3)/MCL
INSTRUMENT WBC (OLG): 1.66 X10(3)/MCL
INTERVENTRICULAR SEPTUM: 1 CM (ref 0.6–1.1)
KETONES UR QL STRIP.AUTO: NEGATIVE
KLEBSIELLA AEROGENES (OHS): NOT DETECTED
KLEBSIELLA OXYTOCA (OHS): NOT DETECTED
KLEBSIELLA PNEUMONIAE GROUP (OHS): NOT DETECTED
KPC (OHS): NOT DETECTED
L MONOCYTOG DNA CSF QL NAA+NON-PROBE: NOT DETECTED
LACTATE SERPL-SCNC: 2.2 MMOL/L (ref 0.5–2.2)
LACTATE SERPL-SCNC: 3.6 MMOL/L (ref 0.5–2.2)
LEFT ATRIUM SIZE: 4 CM
LEFT ATRIUM VOLUME INDEX MOD: 44.1 ML/M2
LEFT ATRIUM VOLUME MOD: 65.2 CM3
LEFT INTERNAL DIMENSION IN SYSTOLE: 4.3 CM (ref 2.1–4)
LEFT VENTRICLE DIASTOLIC VOLUME INDEX: 83.78 ML/M2
LEFT VENTRICLE DIASTOLIC VOLUME: 124 ML
LEFT VENTRICLE MASS INDEX: 127 G/M2
LEFT VENTRICLE SYSTOLIC VOLUME INDEX: 56.1 ML/M2
LEFT VENTRICLE SYSTOLIC VOLUME: 83.1 ML
LEFT VENTRICULAR INTERNAL DIMENSION IN DIASTOLE: 5.1 CM (ref 3.5–6)
LEFT VENTRICULAR MASS: 188.02 G
LEUKOCYTE ESTERASE UR QL STRIP.AUTO: NEGATIVE
LIPASE SERPL-CCNC: 21 U/L
LPM (OHS): 5
LV LATERAL E/E' RATIO: 11.08 M/S
LV SEPTAL E/E' RATIO: 16.63 M/S
LVOT MG: 3 MMHG
LVOT MV: 0.82 CM/S
LYMPHOCYTES # BLD AUTO: 0.53 X10(3)/MCL (ref 0.6–4.6)
LYMPHOCYTES # BLD AUTO: 0.58 X10(3)/MCL (ref 0.6–4.6)
LYMPHOCYTES # BLD AUTO: 1.22 X10(3)/MCL (ref 0.6–4.6)
LYMPHOCYTES NFR BLD AUTO: 4.4 %
LYMPHOCYTES NFR BLD AUTO: 9.4 %
LYMPHOCYTES NFR BLD AUTO: 9.7 %
LYMPHOCYTES NFR BLD MANUAL: 0.16 X10(3)/MCL
LYMPHOCYTES NFR BLD MANUAL: 0.41 X10(3)/MCL
LYMPHOCYTES NFR BLD MANUAL: 12 %
LYMPHOCYTES NFR BLD MANUAL: 21 %
LYMPHOCYTES NFR BLD MANUAL: 25 %
MACROCYTES BLD QL SMEAR: ABNORMAL
MAGNESIUM SERPL-MCNC: 1.8 MG/DL (ref 1.6–2.6)
MAGNESIUM SERPL-MCNC: 1.9 MG/DL (ref 1.6–2.6)
MAGNESIUM SERPL-MCNC: 2.1 MG/DL (ref 1.6–2.6)
MAGNESIUM SERPL-MCNC: 2.3 MG/DL (ref 1.6–2.6)
MAGNESIUM SERPL-MCNC: 2.7 MG/DL (ref 1.6–2.6)
MCH RBC QN AUTO: 31.6 PG (ref 27–31)
MCH RBC QN AUTO: 31.8 PG (ref 27–31)
MCH RBC QN AUTO: 32.1 PG (ref 27–31)
MCH RBC QN AUTO: 32.1 PG (ref 27–31)
MCH RBC QN AUTO: 32.3 PG (ref 27–31)
MCH RBC QN AUTO: 32.6 PG (ref 27–31)
MCHC RBC AUTO-ENTMCNC: 29.6 G/DL (ref 33–36)
MCHC RBC AUTO-ENTMCNC: 29.7 G/DL (ref 33–36)
MCHC RBC AUTO-ENTMCNC: 30.1 G/DL (ref 33–36)
MCHC RBC AUTO-ENTMCNC: 30.3 G/DL (ref 33–36)
MCHC RBC AUTO-ENTMCNC: 30.3 G/DL (ref 33–36)
MCHC RBC AUTO-ENTMCNC: 30.7 G/DL (ref 33–36)
MCR-1 (OHS): NOT DETECTED
MCV RBC AUTO: 104.3 FL (ref 80–94)
MCV RBC AUTO: 106.2 FL (ref 80–94)
MCV RBC AUTO: 106.6 FL (ref 80–94)
MCV RBC AUTO: 106.9 FL (ref 80–94)
MCV RBC AUTO: 107.4 FL (ref 80–94)
MCV RBC AUTO: 108.2 FL (ref 80–94)
MECA/C (OHS): ABNORMAL
MECA/C AND MREJ (MRSA)(OHS): ABNORMAL
MECH RR (OHS): 24 B/MIN
MECH RR (OHS): 28 B/MIN
MECH RR (OHS): 28 B/MIN
MECH RR (OHS): 30 B/MIN
METAMYELOCYTES NFR BLD MANUAL: 2 %
METAMYELOCYTES NFR BLD MANUAL: 25 %
METAMYELOCYTES NFR BLD MANUAL: 65 %
METHGB MFR BLDA: 0 % (ref 0.4–1.5)
MODE (OHS): AC
MONOCYTES # BLD AUTO: 0.34 X10(3)/MCL (ref 0.1–1.3)
MONOCYTES # BLD AUTO: 0.65 X10(3)/MCL (ref 0.1–1.3)
MONOCYTES # BLD AUTO: 0.83 X10(3)/MCL (ref 0.1–1.3)
MONOCYTES NFR BLD AUTO: 11.9 %
MONOCYTES NFR BLD AUTO: 2.6 %
MONOCYTES NFR BLD AUTO: 6.4 %
MONOCYTES NFR BLD MANUAL: 0.05 X10(3)/MCL (ref 0.1–1.3)
MONOCYTES NFR BLD MANUAL: 0.07 X10(3)/MCL (ref 0.1–1.3)
MONOCYTES NFR BLD MANUAL: 1 %
MONOCYTES NFR BLD MANUAL: 4 %
MONOCYTES NFR BLD MANUAL: 4 %
MUCOUS THREADS URNS QL MICRO: ABNORMAL /LPF
MV MEAN GRADIENT: 4 MMHG
MV PEAK A VEL: 1.08 M/S
MV PEAK E VEL: 1.33 M/S
MV PEAK GRADIENT: 8 MMHG
MV STENOSIS PRESSURE HALF TIME: 50 MS
MV VALVE AREA BY CONTINUITY EQUATION: 1.99 CM2
MV VALVE AREA P 1/2 METHOD: 4.4 CM2
MYELOCYTES NFR BLD MANUAL: 21 %
MYELOCYTES NFR BLD MANUAL: 8 %
N MEN DNA CSF QL NAA+NON-PROBE: NOT DETECTED
NDM (OHS): NOT DETECTED
NEUTROPHILS # BLD AUTO: 10.59 X10(3)/MCL (ref 2.1–9.2)
NEUTROPHILS # BLD AUTO: 12.16 X10(3)/MCL (ref 2.1–9.2)
NEUTROPHILS # BLD AUTO: 4.17 X10(3)/MCL (ref 2.1–9.2)
NEUTROPHILS NFR BLD AUTO: 76.2 %
NEUTROPHILS NFR BLD AUTO: 81.8 %
NEUTROPHILS NFR BLD AUTO: 91.9 %
NEUTROPHILS NFR BLD MANUAL: 26 %
NEUTROPHILS NFR BLD MANUAL: 6 %
NEUTROPHILS NFR BLD MANUAL: 69 %
NITRITE UR QL STRIP.AUTO: NEGATIVE
NRBC BLD AUTO-RTO: 0 %
NRBC BLD AUTO-RTO: 2.4 %
NRBC BLD AUTO-RTO: 8.8 %
NRBC BLD MANUAL-RTO: 35 %
O2 HB BLOOD GAS (OHS): 88.2 % (ref 94–97)
O2 HB BLOOD GAS (OHS): 93.5 % (ref 94–97)
O2 HB BLOOD GAS (OHS): 94.9 % (ref 94–97)
OHS LV EJECTION FRACTION SIMPSONS BIPLANE MOD: 39 %
OHS QRS DURATION: 86 MS
OHS QRS DURATION: 88 MS
OHS QTC CALCULATION: 477 MS
OHS QTC CALCULATION: 491 MS
OVALOCYTES (OLG): ABNORMAL
OXA-48-LIKE (OHS): NOT DETECTED
OXYGEN DEVICE BLOOD GAS (OHS): ABNORMAL
OXYGEN DEVICE BLOOD GAS (OHS): ABNORMAL
OXYHGB MFR BLDA: 8.8 G/DL (ref 12–16)
OXYHGB MFR BLDA: 9.1 G/DL (ref 12–16)
OXYHGB MFR BLDA: 9.6 G/DL (ref 12–16)
PCO2 BLDA: 36 MMHG (ref 35–45)
PCO2 BLDA: 43 MMHG (ref 35–45)
PCO2 BLDA: 46 MMHG (ref 35–45)
PCO2 BLDA: 50 MMHG (ref 35–45)
PCO2 BLDA: 51 MMHG (ref 35–45)
PEEP RESPIRATORY: 8 CMH2O
PH BLDA: 7.04 [PH] (ref 7.35–7.45)
PH BLDA: 7.16 [PH] (ref 7.35–7.45)
PH BLDA: 7.16 [PH] (ref 7.35–7.45)
PH BLDA: 7.21 [PH] (ref 7.35–7.45)
PH BLDA: 7.28 [PH] (ref 7.35–7.45)
PH UR STRIP.AUTO: 7 [PH]
PHOSPHATE SERPL-MCNC: 6.1 MG/DL (ref 2.3–4.7)
PHOSPHATE SERPL-MCNC: 7.5 MG/DL (ref 2.3–4.7)
PHOSPHATE SERPL-MCNC: 7.8 MG/DL (ref 2.3–4.7)
PHOSPHATE SERPL-MCNC: 8.6 MG/DL (ref 2.3–4.7)
PISA TR MAX VEL: 2.9 M/S
PLATELET # BLD AUTO: 186 X10(3)/MCL (ref 130–400)
PLATELET # BLD AUTO: 198 X10(3)/MCL (ref 130–400)
PLATELET # BLD AUTO: 201 X10(3)/MCL (ref 130–400)
PLATELET # BLD AUTO: 241 X10(3)/MCL (ref 130–400)
PLATELET # BLD AUTO: 242 X10(3)/MCL (ref 130–400)
PLATELET # BLD AUTO: 245 X10(3)/MCL (ref 130–400)
PLATELET # BLD EST: NORMAL 10*3/UL
PLATELET # BLD EST: NORMAL 10*3/UL
PMV BLD AUTO: 10 FL (ref 7.4–10.4)
PMV BLD AUTO: 10.4 FL (ref 7.4–10.4)
PMV BLD AUTO: 10.7 FL (ref 7.4–10.4)
PMV BLD AUTO: 9.8 FL (ref 7.4–10.4)
PMV BLD AUTO: 9.9 FL (ref 7.4–10.4)
PMV BLD AUTO: 9.9 FL (ref 7.4–10.4)
PO2 BLDA: 58 MMHG (ref 80–100)
PO2 BLDA: 59 MMHG (ref 80–100)
PO2 BLDA: 79 MMHG (ref 80–100)
PO2 BLDA: 95 MMHG (ref 80–100)
PO2 BLDA: 98 MMHG (ref 80–100)
POCT GLUCOSE: 109 MG/DL (ref 70–110)
POCT GLUCOSE: 64 MG/DL (ref 70–110)
POCT GLUCOSE: 64 MG/DL (ref 70–110)
POCT GLUCOSE: 66 MG/DL (ref 70–110)
POIKILOCYTOSIS BLD QL SMEAR: ABNORMAL
POTASSIUM BLOOD GAS (OHS): 4.4 MMOL/L (ref 3.5–5)
POTASSIUM BLOOD GAS (OHS): 4.4 MMOL/L (ref 3.5–5)
POTASSIUM BLOOD GAS (OHS): 4.5 MMOL/L (ref 3.5–5)
POTASSIUM BLOOD GAS (OHS): 5.5 MMOL/L (ref 3.5–5)
POTASSIUM BLOOD GAS (OHS): 6.1 MMOL/L (ref 3.5–5)
POTASSIUM SERPL-SCNC: 4.5 MMOL/L (ref 3.5–5.1)
POTASSIUM SERPL-SCNC: 4.6 MMOL/L (ref 3.5–5.1)
POTASSIUM SERPL-SCNC: 4.6 MMOL/L (ref 3.5–5.1)
POTASSIUM SERPL-SCNC: 5.1 MMOL/L (ref 3.5–5.1)
POTASSIUM SERPL-SCNC: 5.5 MMOL/L (ref 3.5–5.1)
POTASSIUM SERPL-SCNC: 6 MMOL/L (ref 3.5–5.1)
PREALB SERPL-MCNC: 10.2 MG/DL (ref 14–37)
PROMYELOCYTES # BLD MANUAL: 1 %
PROT SERPL-MCNC: 4.3 GM/DL (ref 5.8–7.6)
PROT SERPL-MCNC: 4.8 GM/DL (ref 5.8–7.6)
PROT SERPL-MCNC: 5.8 GM/DL (ref 5.8–7.6)
PROT SERPL-MCNC: 7.6 GM/DL (ref 5.8–7.6)
PROT UR QL STRIP.AUTO: ABNORMAL
PROTEUS SPP. (OHS): NOT DETECTED
PROTHROMBIN TIME: 22.2 SECONDS (ref 12.5–14.5)
PSEUDOMONAS AERUGINOSA (OHS): NOT DETECTED
PTH-INTACT SERPL-MCNC: 347.4 PG/ML (ref 8.7–77)
RBC # BLD AUTO: 2.83 X10(6)/MCL (ref 4.2–5.4)
RBC # BLD AUTO: 2.87 X10(6)/MCL (ref 4.2–5.4)
RBC # BLD AUTO: 3.05 X10(6)/MCL (ref 4.2–5.4)
RBC # BLD AUTO: 3.34 X10(6)/MCL (ref 4.2–5.4)
RBC # BLD AUTO: 3.37 X10(6)/MCL (ref 4.2–5.4)
RBC # BLD AUTO: 4.43 X10(6)/MCL (ref 4.2–5.4)
RBC #/AREA URNS AUTO: ABNORMAL /HPF
RBC MORPH BLD: ABNORMAL
RBC MORPH BLD: ABNORMAL
RBC UR QL AUTO: NEGATIVE
S ENT+BONG DNA STL QL NAA+NON-PROBE: NOT DETECTED
S PNEUM DNA CSF QL NAA+NON-PROBE: NOT DETECTED
SAMPLE SITE BLOOD GAS (OHS): ABNORMAL
SAO2 % BLDA: 80 %
SAO2 % BLDA: 86.3 %
SAO2 % BLDA: 88 %
SAO2 % BLDA: 94.9 %
SAO2 % BLDA: 96 %
SERRATIA MARCESCENS (OHS): NOT DETECTED
SODIUM BLOOD GAS (OHS): 130 MMOL/L (ref 137–145)
SODIUM BLOOD GAS (OHS): 133 MMOL/L (ref 137–145)
SODIUM BLOOD GAS (OHS): 134 MMOL/L (ref 137–145)
SODIUM BLOOD GAS (OHS): 135 MMOL/L (ref 137–145)
SODIUM BLOOD GAS (OHS): 135 MMOL/L (ref 137–145)
SODIUM SERPL-SCNC: 137 MMOL/L (ref 136–145)
SODIUM SERPL-SCNC: 139 MMOL/L (ref 136–145)
SODIUM SERPL-SCNC: 139 MMOL/L (ref 136–145)
SODIUM SERPL-SCNC: 141 MMOL/L (ref 136–145)
SODIUM SERPL-SCNC: 141 MMOL/L (ref 136–145)
SODIUM SERPL-SCNC: 142 MMOL/L (ref 136–145)
SP GR UR STRIP.AUTO: 1.01 (ref 1–1.03)
SPECIMEN OUTDATE: NORMAL
SPONT+MECH VT ON VENT: 450 ML
SQUAMOUS #/AREA URNS LPF: ABNORMAL /HPF
STAPHYLOCOCCUS AUREUS (OHS): NOT DETECTED
STAPHYLOCOCCUS EPIDERMIDIS (OHS): NOT DETECTED
STAPHYLOCOCCUS LUGDUNENSIS (OHS): NOT DETECTED
STAPHYLOCOCCUS SPP. (OHS): NOT DETECTED
STENOTROPHOMONAS MALTOPHILIA (OHS): NOT DETECTED
STREPTOCOCCUS PYOGENES (GROUP A)(OHS): NOT DETECTED
STREPTOCOCCUS SPP. (OHS): NOT DETECTED
TDI LATERAL: 0.12 M/S
TDI SEPTAL: 0.08 M/S
TDI: 0.1 M/S
TR MAX PG: 34 MMHG
TRICUSPID ANNULAR PLANE SYSTOLIC EXCURSION: 1.97 CM
TROPONIN I SERPL-MCNC: 0.08 NG/ML (ref 0–0.04)
TROPONIN I SERPL-MCNC: 0.09 NG/ML (ref 0–0.04)
TROPONIN I SERPL-MCNC: 1.15 NG/ML (ref 0–0.04)
TSH SERPL-ACNC: 0.91 UIU/ML (ref 0.35–4.94)
UROBILINOGEN UR STRIP-ACNC: NORMAL
VANA/B (OHS): ABNORMAL
VIM (OHS): NOT DETECTED
VIT B12 SERPL-MCNC: 470 PG/ML (ref 213–816)
WBC # SPEC AUTO: 1.37 X10(3)/MCL (ref 4.5–11.5)
WBC # SPEC AUTO: 1.66 X10(3)/MCL (ref 4.5–11.5)
WBC # SPEC AUTO: 1.68 X10(3)/MCL (ref 4.5–11.5)
WBC # SPEC AUTO: 12.96 X10(3)/MCL (ref 4.5–11.5)
WBC # SPEC AUTO: 13.21 X10(3)/MCL (ref 4.5–11.5)
WBC # SPEC AUTO: 5.47 X10(3)/MCL (ref 4.5–11.5)
WBC #/AREA URNS AUTO: ABNORMAL /HPF
Z-SCORE OF LEFT VENTRICULAR DIMENSION IN END DIASTOLE: 1.45
Z-SCORE OF LEFT VENTRICULAR DIMENSION IN END SYSTOLE: 3.56

## 2024-01-01 PROCEDURE — 84100 ASSAY OF PHOSPHORUS: CPT | Performed by: STUDENT IN AN ORGANIZED HEALTH CARE EDUCATION/TRAINING PROGRAM

## 2024-01-01 PROCEDURE — 25000003 PHARM REV CODE 250: Mod: JZ,JG | Performed by: STUDENT IN AN ORGANIZED HEALTH CARE EDUCATION/TRAINING PROGRAM

## 2024-01-01 PROCEDURE — 83735 ASSAY OF MAGNESIUM: CPT

## 2024-01-01 PROCEDURE — 25000003 PHARM REV CODE 250

## 2024-01-01 PROCEDURE — 82607 VITAMIN B-12: CPT | Performed by: INTERNAL MEDICINE

## 2024-01-01 PROCEDURE — 71000039 HC RECOVERY, EACH ADD'L HOUR: Performed by: SURGERY

## 2024-01-01 PROCEDURE — 63600175 PHARM REV CODE 636 W HCPCS

## 2024-01-01 PROCEDURE — 84484 ASSAY OF TROPONIN QUANT: CPT | Performed by: STUDENT IN AN ORGANIZED HEALTH CARE EDUCATION/TRAINING PROGRAM

## 2024-01-01 PROCEDURE — 25000242 PHARM REV CODE 250 ALT 637 W/ HCPCS: Performed by: NURSE PRACTITIONER

## 2024-01-01 PROCEDURE — 85025 COMPLETE CBC W/AUTO DIFF WBC: CPT

## 2024-01-01 PROCEDURE — 94002 VENT MGMT INPAT INIT DAY: CPT

## 2024-01-01 PROCEDURE — 25000003 PHARM REV CODE 250: Performed by: INTERNAL MEDICINE

## 2024-01-01 PROCEDURE — 83735 ASSAY OF MAGNESIUM: CPT | Performed by: NURSE PRACTITIONER

## 2024-01-01 PROCEDURE — 36901 INTRO CATH DIALYSIS CIRCUIT: CPT | Mod: LT | Performed by: STUDENT IN AN ORGANIZED HEALTH CARE EDUCATION/TRAINING PROGRAM

## 2024-01-01 PROCEDURE — 63600175 PHARM REV CODE 636 W HCPCS: Performed by: NURSE PRACTITIONER

## 2024-01-01 PROCEDURE — 25000003 PHARM REV CODE 250: Performed by: STUDENT IN AN ORGANIZED HEALTH CARE EDUCATION/TRAINING PROGRAM

## 2024-01-01 PROCEDURE — 11000001 HC ACUTE MED/SURG PRIVATE ROOM

## 2024-01-01 PROCEDURE — 87040 BLOOD CULTURE FOR BACTERIA: CPT | Performed by: STUDENT IN AN ORGANIZED HEALTH CARE EDUCATION/TRAINING PROGRAM

## 2024-01-01 PROCEDURE — 36600 WITHDRAWAL OF ARTERIAL BLOOD: CPT

## 2024-01-01 PROCEDURE — 63600175 PHARM REV CODE 636 W HCPCS: Performed by: STUDENT IN AN ORGANIZED HEALTH CARE EDUCATION/TRAINING PROGRAM

## 2024-01-01 PROCEDURE — 87340 HEPATITIS B SURFACE AG IA: CPT | Performed by: INTERNAL MEDICINE

## 2024-01-01 PROCEDURE — 63600175 PHARM REV CODE 636 W HCPCS: Performed by: INTERNAL MEDICINE

## 2024-01-01 PROCEDURE — 25000003 PHARM REV CODE 250: Performed by: NURSE PRACTITIONER

## 2024-01-01 PROCEDURE — 99152 MOD SED SAME PHYS/QHP 5/>YRS: CPT | Mod: ,,, | Performed by: STUDENT IN AN ORGANIZED HEALTH CARE EDUCATION/TRAINING PROGRAM

## 2024-01-01 PROCEDURE — 36000707: Performed by: SURGERY

## 2024-01-01 PROCEDURE — 27100171 HC OXYGEN HIGH FLOW UP TO 24 HOURS

## 2024-01-01 PROCEDURE — 83970 ASSAY OF PARATHORMONE: CPT | Performed by: INTERNAL MEDICINE

## 2024-01-01 PROCEDURE — 96374 THER/PROPH/DIAG INJ IV PUSH: CPT

## 2024-01-01 PROCEDURE — 80053 COMPREHEN METABOLIC PANEL: CPT | Performed by: STUDENT IN AN ORGANIZED HEALTH CARE EDUCATION/TRAINING PROGRAM

## 2024-01-01 PROCEDURE — 20000000 HC ICU ROOM

## 2024-01-01 PROCEDURE — 82550 ASSAY OF CK (CPK): CPT

## 2024-01-01 PROCEDURE — 37000008 HC ANESTHESIA 1ST 15 MINUTES: Performed by: SURGERY

## 2024-01-01 PROCEDURE — 37799 UNLISTED PX VASCULAR SURGERY: CPT

## 2024-01-01 PROCEDURE — 99900035 HC TECH TIME PER 15 MIN (STAT)

## 2024-01-01 PROCEDURE — 85730 THROMBOPLASTIN TIME PARTIAL: CPT

## 2024-01-01 PROCEDURE — 36410 VNPNXR 3YR/> PHY/QHP DX/THER: CPT

## 2024-01-01 PROCEDURE — 0BH17EZ INSERTION OF ENDOTRACHEAL AIRWAY INTO TRACHEA, VIA NATURAL OR ARTIFICIAL OPENING: ICD-10-PCS | Performed by: INTERNAL MEDICINE

## 2024-01-01 PROCEDURE — 96361 HYDRATE IV INFUSION ADD-ON: CPT

## 2024-01-01 PROCEDURE — 80069 RENAL FUNCTION PANEL: CPT | Performed by: INTERNAL MEDICINE

## 2024-01-01 PROCEDURE — 82803 BLOOD GASES ANY COMBINATION: CPT

## 2024-01-01 PROCEDURE — 63600175 PHARM REV CODE 636 W HCPCS: Mod: JZ,JG | Performed by: STUDENT IN AN ORGANIZED HEALTH CARE EDUCATION/TRAINING PROGRAM

## 2024-01-01 PROCEDURE — 99213 OFFICE O/P EST LOW 20 MIN: CPT | Mod: 25,,, | Performed by: STUDENT IN AN ORGANIZED HEALTH CARE EDUCATION/TRAINING PROGRAM

## 2024-01-01 PROCEDURE — C9113 INJ PANTOPRAZOLE SODIUM, VIA: HCPCS | Performed by: NURSE PRACTITIONER

## 2024-01-01 PROCEDURE — 83605 ASSAY OF LACTIC ACID: CPT | Performed by: STUDENT IN AN ORGANIZED HEALTH CARE EDUCATION/TRAINING PROGRAM

## 2024-01-01 PROCEDURE — 84443 ASSAY THYROID STIM HORMONE: CPT | Performed by: INTERNAL MEDICINE

## 2024-01-01 PROCEDURE — 94760 N-INVAS EAR/PLS OXIMETRY 1: CPT | Mod: XB

## 2024-01-01 PROCEDURE — C1751 CATH, INF, PER/CENT/MIDLINE: HCPCS

## 2024-01-01 PROCEDURE — 99233 SBSQ HOSP IP/OBS HIGH 50: CPT | Mod: 57,,, | Performed by: SURGERY

## 2024-01-01 PROCEDURE — 27000646 HC AEROBIKA DEVICE

## 2024-01-01 PROCEDURE — 31500 INSERT EMERGENCY AIRWAY: CPT

## 2024-01-01 PROCEDURE — D9220A PRA ANESTHESIA: Mod: CRNA,,,

## 2024-01-01 PROCEDURE — 81001 URINALYSIS AUTO W/SCOPE: CPT

## 2024-01-01 PROCEDURE — 86706 HEP B SURFACE ANTIBODY: CPT | Performed by: INTERNAL MEDICINE

## 2024-01-01 PROCEDURE — 0DN80ZZ RELEASE SMALL INTESTINE, OPEN APPROACH: ICD-10-PCS | Performed by: SURGERY

## 2024-01-01 PROCEDURE — 82746 ASSAY OF FOLIC ACID SERUM: CPT | Performed by: INTERNAL MEDICINE

## 2024-01-01 PROCEDURE — C9113 INJ PANTOPRAZOLE SODIUM, VIA: HCPCS

## 2024-01-01 PROCEDURE — 85025 COMPLETE CBC W/AUTO DIFF WBC: CPT | Performed by: NURSE PRACTITIONER

## 2024-01-01 PROCEDURE — D9220A PRA ANESTHESIA: Mod: ANES,,, | Performed by: ANESTHESIOLOGY

## 2024-01-01 PROCEDURE — 83690 ASSAY OF LIPASE: CPT

## 2024-01-01 PROCEDURE — 85027 COMPLETE CBC AUTOMATED: CPT | Performed by: STUDENT IN AN ORGANIZED HEALTH CARE EDUCATION/TRAINING PROGRAM

## 2024-01-01 PROCEDURE — 83605 ASSAY OF LACTIC ACID: CPT | Performed by: INTERNAL MEDICINE

## 2024-01-01 PROCEDURE — 99900031 HC PATIENT EDUCATION (STAT)

## 2024-01-01 PROCEDURE — 84134 ASSAY OF PREALBUMIN: CPT

## 2024-01-01 PROCEDURE — 36901 INTRO CATH DIALYSIS CIRCUIT: CPT | Mod: LT,,, | Performed by: STUDENT IN AN ORGANIZED HEALTH CARE EDUCATION/TRAINING PROGRAM

## 2024-01-01 PROCEDURE — 36000706: Performed by: SURGERY

## 2024-01-01 PROCEDURE — 80053 COMPREHEN METABOLIC PANEL: CPT | Performed by: NURSE PRACTITIONER

## 2024-01-01 PROCEDURE — 83735 ASSAY OF MAGNESIUM: CPT | Performed by: INTERNAL MEDICINE

## 2024-01-01 PROCEDURE — A4216 STERILE WATER/SALINE, 10 ML: HCPCS | Performed by: STUDENT IN AN ORGANIZED HEALTH CARE EDUCATION/TRAINING PROGRAM

## 2024-01-01 PROCEDURE — 93005 ELECTROCARDIOGRAM TRACING: CPT

## 2024-01-01 PROCEDURE — 44005 FREEING OF BOWEL ADHESION: CPT | Mod: ,,, | Performed by: SURGERY

## 2024-01-01 PROCEDURE — 27201247 HC HEMODIALYSIS, SET-UP & CANCEL

## 2024-01-01 PROCEDURE — 63600175 PHARM REV CODE 636 W HCPCS: Mod: JZ,JG | Performed by: SURGERY

## 2024-01-01 PROCEDURE — 27201423 OPTIME MED/SURG SUP & DEVICES STERILE SUPPLY: Performed by: SURGERY

## 2024-01-01 PROCEDURE — 85007 BL SMEAR W/DIFF WBC COUNT: CPT | Performed by: STUDENT IN AN ORGANIZED HEALTH CARE EDUCATION/TRAINING PROGRAM

## 2024-01-01 PROCEDURE — 02HV33Z INSERTION OF INFUSION DEVICE INTO SUPERIOR VENA CAVA, PERCUTANEOUS APPROACH: ICD-10-PCS | Performed by: INTERNAL MEDICINE

## 2024-01-01 PROCEDURE — 37000009 HC ANESTHESIA EA ADD 15 MINS: Performed by: SURGERY

## 2024-01-01 PROCEDURE — 86901 BLOOD TYPING SEROLOGIC RH(D): CPT | Performed by: SURGERY

## 2024-01-01 PROCEDURE — 85007 BL SMEAR W/DIFF WBC COUNT: CPT | Performed by: INTERNAL MEDICINE

## 2024-01-01 PROCEDURE — 85025 COMPLETE CBC W/AUTO DIFF WBC: CPT | Performed by: INTERNAL MEDICINE

## 2024-01-01 PROCEDURE — 94664 DEMO&/EVAL PT USE INHALER: CPT

## 2024-01-01 PROCEDURE — 5A1945Z RESPIRATORY VENTILATION, 24-96 CONSECUTIVE HOURS: ICD-10-PCS | Performed by: INTERNAL MEDICINE

## 2024-01-01 PROCEDURE — 63600175 PHARM REV CODE 636 W HCPCS: Performed by: ANESTHESIOLOGY

## 2024-01-01 PROCEDURE — 71000033 HC RECOVERY, INTIAL HOUR: Performed by: SURGERY

## 2024-01-01 PROCEDURE — 87154 CUL TYP ID BLD PTHGN 6+ TRGT: CPT | Performed by: STUDENT IN AN ORGANIZED HEALTH CARE EDUCATION/TRAINING PROGRAM

## 2024-01-01 PROCEDURE — 25000003 PHARM REV CODE 250: Mod: JZ,JG

## 2024-01-01 PROCEDURE — 27200966 HC CLOSED SUCTION SYSTEM

## 2024-01-01 PROCEDURE — 99233 SBSQ HOSP IP/OBS HIGH 50: CPT | Mod: ,,, | Performed by: SURGERY

## 2024-01-01 PROCEDURE — 83735 ASSAY OF MAGNESIUM: CPT | Performed by: STUDENT IN AN ORGANIZED HEALTH CARE EDUCATION/TRAINING PROGRAM

## 2024-01-01 PROCEDURE — 96376 TX/PRO/DX INJ SAME DRUG ADON: CPT

## 2024-01-01 PROCEDURE — 96375 TX/PRO/DX INJ NEW DRUG ADDON: CPT

## 2024-01-01 PROCEDURE — 82553 CREATINE MB FRACTION: CPT

## 2024-01-01 PROCEDURE — 99285 EMERGENCY DEPT VISIT HI MDM: CPT | Mod: 25

## 2024-01-01 PROCEDURE — 80053 COMPREHEN METABOLIC PANEL: CPT

## 2024-01-01 PROCEDURE — 85610 PROTHROMBIN TIME: CPT

## 2024-01-01 PROCEDURE — 25500020 PHARM REV CODE 255: Performed by: STUDENT IN AN ORGANIZED HEALTH CARE EDUCATION/TRAINING PROGRAM

## 2024-01-01 PROCEDURE — P9047 ALBUMIN (HUMAN), 25%, 50ML: HCPCS | Mod: JZ,JG

## 2024-01-01 RX ORDER — FENTANYL CITRATE 50 UG/ML
INJECTION, SOLUTION INTRAMUSCULAR; INTRAVENOUS CODE/TRAUMA/SEDATION MEDICATION
Status: DISCONTINUED | OUTPATIENT
Start: 2024-01-01 | End: 2024-01-01 | Stop reason: HOSPADM

## 2024-01-01 RX ORDER — AMLODIPINE BESYLATE 2.5 MG/1
2.5 TABLET ORAL DAILY
Status: DISCONTINUED | OUTPATIENT
Start: 2024-01-01 | End: 2024-01-01 | Stop reason: HOSPADM

## 2024-01-01 RX ORDER — PROPOFOL 10 MG/ML
VIAL (ML) INTRAVENOUS
Status: DISCONTINUED | OUTPATIENT
Start: 2024-01-01 | End: 2024-03-25

## 2024-01-01 RX ORDER — ONDANSETRON HYDROCHLORIDE 2 MG/ML
4 INJECTION, SOLUTION INTRAVENOUS EVERY 4 HOURS PRN
Status: DISCONTINUED | OUTPATIENT
Start: 2024-01-01 | End: 2024-01-01 | Stop reason: HOSPADM

## 2024-01-01 RX ORDER — IBUPROFEN 200 MG
16 TABLET ORAL
Status: DISCONTINUED | OUTPATIENT
Start: 2024-01-01 | End: 2024-01-01 | Stop reason: HOSPADM

## 2024-01-01 RX ORDER — DEXAMETHASONE SODIUM PHOSPHATE 4 MG/ML
8 INJECTION, SOLUTION INTRA-ARTICULAR; INTRALESIONAL; INTRAMUSCULAR; INTRAVENOUS; SOFT TISSUE EVERY 12 HOURS
Status: DISCONTINUED | OUTPATIENT
Start: 2024-01-01 | End: 2024-01-01 | Stop reason: HOSPADM

## 2024-01-01 RX ORDER — PANTOPRAZOLE SODIUM 40 MG/10ML
80 INJECTION, POWDER, LYOPHILIZED, FOR SOLUTION INTRAVENOUS DAILY
Status: DISCONTINUED | OUTPATIENT
Start: 2024-01-01 | End: 2024-01-01 | Stop reason: HOSPADM

## 2024-01-01 RX ORDER — FENTANYL CITRATE 50 UG/ML
INJECTION, SOLUTION INTRAMUSCULAR; INTRAVENOUS
Status: DISCONTINUED | OUTPATIENT
Start: 2024-01-01 | End: 2024-03-25

## 2024-01-01 RX ORDER — DOBUTAMINE HYDROCHLORIDE 400 MG/100ML
0-20 INJECTION INTRAVENOUS CONTINUOUS
Status: DISCONTINUED | OUTPATIENT
Start: 2024-01-01 | End: 2024-01-01

## 2024-01-01 RX ORDER — SODIUM CHLORIDE 9 MG/ML
INJECTION, SOLUTION INTRAVENOUS CONTINUOUS
Status: DISCONTINUED | OUTPATIENT
Start: 2024-01-01 | End: 2024-01-01

## 2024-01-01 RX ORDER — CALCIUM GLUCONATE 20 MG/ML
1 INJECTION, SOLUTION INTRAVENOUS ONCE
Status: COMPLETED | OUTPATIENT
Start: 2024-01-01 | End: 2024-01-01

## 2024-01-01 RX ORDER — PROCHLORPERAZINE EDISYLATE 5 MG/ML
5 INJECTION INTRAMUSCULAR; INTRAVENOUS EVERY 6 HOURS PRN
Status: DISCONTINUED | OUTPATIENT
Start: 2024-01-01 | End: 2024-01-01 | Stop reason: HOSPADM

## 2024-01-01 RX ORDER — ACETAMINOPHEN 10 MG/ML
1000 INJECTION, SOLUTION INTRAVENOUS EVERY 8 HOURS
Status: DISCONTINUED | OUTPATIENT
Start: 2024-01-01 | End: 2024-01-01 | Stop reason: HOSPADM

## 2024-01-01 RX ORDER — LIDOCAINE HYDROCHLORIDE 20 MG/ML
INJECTION INTRAVENOUS
Status: DISCONTINUED | OUTPATIENT
Start: 2024-01-01 | End: 2024-03-25

## 2024-01-01 RX ORDER — SUCCINYLCHOLINE CHLORIDE 20 MG/ML
INJECTION INTRAMUSCULAR; INTRAVENOUS
Status: DISCONTINUED | OUTPATIENT
Start: 2024-01-01 | End: 2024-03-25

## 2024-01-01 RX ORDER — CLINDAMYCIN PHOSPHATE 900 MG/50ML
900 INJECTION, SOLUTION INTRAVENOUS ONCE
Status: COMPLETED | OUTPATIENT
Start: 2024-01-01 | End: 2024-01-01

## 2024-01-01 RX ORDER — ACETAMINOPHEN 10 MG/ML
1000 INJECTION, SOLUTION INTRAVENOUS ONCE
Status: COMPLETED | OUTPATIENT
Start: 2024-01-01 | End: 2024-01-01

## 2024-01-01 RX ORDER — ATORVASTATIN CALCIUM 20 MG/1
20 TABLET, FILM COATED ORAL DAILY
COMMUNITY

## 2024-01-01 RX ORDER — IBUPROFEN 200 MG
24 TABLET ORAL
Status: DISCONTINUED | OUTPATIENT
Start: 2024-01-01 | End: 2024-01-01 | Stop reason: HOSPADM

## 2024-01-01 RX ORDER — SODIUM CHLORIDE 0.9 % (FLUSH) 0.9 %
10 SYRINGE (ML) INJECTION
Status: DISCONTINUED | OUTPATIENT
Start: 2024-01-01 | End: 2024-01-01 | Stop reason: HOSPADM

## 2024-01-01 RX ORDER — AMLODIPINE BESYLATE 2.5 MG/1
2.5 TABLET ORAL DAILY
COMMUNITY

## 2024-01-01 RX ORDER — AMITRIPTYLINE HYDROCHLORIDE 25 MG/1
25 TABLET, FILM COATED ORAL NIGHTLY
Status: DISCONTINUED | OUTPATIENT
Start: 2024-01-01 | End: 2024-01-01 | Stop reason: HOSPADM

## 2024-01-01 RX ORDER — ALBUMIN HUMAN 250 G/1000ML
25 SOLUTION INTRAVENOUS
Status: DISCONTINUED | OUTPATIENT
Start: 2024-01-01 | End: 2024-01-01 | Stop reason: HOSPADM

## 2024-01-01 RX ORDER — DEXAMETHASONE SODIUM PHOSPHATE 4 MG/ML
8 INJECTION, SOLUTION INTRA-ARTICULAR; INTRALESIONAL; INTRAMUSCULAR; INTRAVENOUS; SOFT TISSUE ONCE
Status: COMPLETED | OUTPATIENT
Start: 2024-01-01 | End: 2024-01-01

## 2024-01-01 RX ORDER — ONDANSETRON HYDROCHLORIDE 2 MG/ML
INJECTION, SOLUTION INTRAVENOUS
Status: DISCONTINUED | OUTPATIENT
Start: 2024-01-01 | End: 2024-03-25

## 2024-01-01 RX ORDER — ONDANSETRON HYDROCHLORIDE 2 MG/ML
4 INJECTION, SOLUTION INTRAVENOUS
Status: COMPLETED | OUTPATIENT
Start: 2024-01-01 | End: 2024-01-01

## 2024-01-01 RX ORDER — HYDROMORPHONE HYDROCHLORIDE 2 MG/ML
0.4 INJECTION, SOLUTION INTRAMUSCULAR; INTRAVENOUS; SUBCUTANEOUS EVERY 5 MIN PRN
Status: DISCONTINUED | OUTPATIENT
Start: 2024-01-01 | End: 2024-01-01 | Stop reason: HOSPADM

## 2024-01-01 RX ORDER — ROCURONIUM BROMIDE 10 MG/ML
INJECTION, SOLUTION INTRAVENOUS
Status: DISCONTINUED | OUTPATIENT
Start: 2024-01-01 | End: 2024-03-25

## 2024-01-01 RX ORDER — CLOPIDOGREL BISULFATE 75 MG/1
75 TABLET ORAL DAILY
Status: DISCONTINUED | OUTPATIENT
Start: 2024-01-01 | End: 2024-01-01

## 2024-01-01 RX ORDER — PANTOPRAZOLE SODIUM 40 MG/10ML
40 INJECTION, POWDER, LYOPHILIZED, FOR SOLUTION INTRAVENOUS 2 TIMES DAILY
Status: DISCONTINUED | OUTPATIENT
Start: 2024-01-01 | End: 2024-01-01 | Stop reason: HOSPADM

## 2024-01-01 RX ORDER — ENOXAPARIN SODIUM 100 MG/ML
30 INJECTION SUBCUTANEOUS EVERY 24 HOURS
Status: DISCONTINUED | OUTPATIENT
Start: 2024-01-01 | End: 2024-01-01

## 2024-01-01 RX ORDER — ATORVASTATIN CALCIUM 10 MG/1
20 TABLET, FILM COATED ORAL NIGHTLY
Status: DISCONTINUED | OUTPATIENT
Start: 2024-01-01 | End: 2024-01-01 | Stop reason: HOSPADM

## 2024-01-01 RX ORDER — GABAPENTIN 300 MG/1
300 CAPSULE ORAL DAILY
Status: DISCONTINUED | OUTPATIENT
Start: 2024-01-01 | End: 2024-01-01 | Stop reason: HOSPADM

## 2024-01-01 RX ORDER — ROPINIROLE 0.25 MG/1
0.25 TABLET, FILM COATED ORAL NIGHTLY
COMMUNITY
End: 2024-01-01 | Stop reason: CLARIF

## 2024-01-01 RX ORDER — PANTOPRAZOLE SODIUM 40 MG/1
40 TABLET, DELAYED RELEASE ORAL NIGHTLY
Status: DISCONTINUED | OUTPATIENT
Start: 2024-01-01 | End: 2024-01-01

## 2024-01-01 RX ORDER — DIGOXIN 0.25 MG/ML
INJECTION INTRAMUSCULAR; INTRAVENOUS
Status: COMPLETED
Start: 2024-01-01 | End: 2024-01-01

## 2024-01-01 RX ORDER — KETOROLAC TROMETHAMINE 30 MG/ML
15 INJECTION, SOLUTION INTRAMUSCULAR; INTRAVENOUS EVERY 6 HOURS PRN
Status: DISPENSED | OUTPATIENT
Start: 2024-01-01 | End: 2024-01-01

## 2024-01-01 RX ORDER — PROPOFOL 10 MG/ML
0-50 INJECTION, EMULSION INTRAVENOUS CONTINUOUS
Status: DISCONTINUED | OUTPATIENT
Start: 2024-01-01 | End: 2024-01-01 | Stop reason: HOSPADM

## 2024-01-01 RX ORDER — CETIRIZINE HYDROCHLORIDE 10 MG/1
10 TABLET ORAL DAILY
Status: DISCONTINUED | OUTPATIENT
Start: 2024-01-01 | End: 2024-01-01 | Stop reason: HOSPADM

## 2024-01-01 RX ORDER — MIDAZOLAM HYDROCHLORIDE 2 MG/2ML
2 INJECTION, SOLUTION INTRAMUSCULAR; INTRAVENOUS ONCE
Status: COMPLETED | OUTPATIENT
Start: 2024-01-01 | End: 2024-01-01

## 2024-01-01 RX ORDER — ATORVASTATIN CALCIUM 10 MG/1
20 TABLET, FILM COATED ORAL DAILY
Status: DISCONTINUED | OUTPATIENT
Start: 2024-01-01 | End: 2024-01-01

## 2024-01-01 RX ORDER — ACETAMINOPHEN 325 MG/1
650 TABLET ORAL EVERY 4 HOURS PRN
Status: DISCONTINUED | OUTPATIENT
Start: 2024-01-01 | End: 2024-01-01 | Stop reason: HOSPADM

## 2024-01-01 RX ORDER — NOREPINEPHRINE BITARTRATE/D5W 8 MG/250ML
PLASTIC BAG, INJECTION (ML) INTRAVENOUS
Status: COMPLETED
Start: 2024-01-01 | End: 2024-01-01

## 2024-01-01 RX ORDER — ACETAMINOPHEN 500 MG
1000 TABLET ORAL EVERY 6 HOURS PRN
Status: DISCONTINUED | OUTPATIENT
Start: 2024-01-01 | End: 2024-01-01 | Stop reason: HOSPADM

## 2024-01-01 RX ORDER — FLUTICASONE PROPIONATE AND SALMETEROL 250; 50 UG/1; UG/1
1 POWDER RESPIRATORY (INHALATION) 2 TIMES DAILY
COMMUNITY

## 2024-01-01 RX ORDER — HEPARIN SODIUM,PORCINE/D5W 25000/250
0-40 INTRAVENOUS SOLUTION INTRAVENOUS CONTINUOUS
Status: DISCONTINUED | OUTPATIENT
Start: 2024-01-01 | End: 2024-01-01 | Stop reason: HOSPADM

## 2024-01-01 RX ORDER — DIGOXIN 0.25 MG/ML
300 INJECTION INTRAMUSCULAR; INTRAVENOUS ONCE
Status: COMPLETED | OUTPATIENT
Start: 2024-01-01 | End: 2024-01-01

## 2024-01-01 RX ORDER — FENTANYL CITRATE 50 UG/ML
INJECTION, SOLUTION INTRAMUSCULAR; INTRAVENOUS
Status: DISCONTINUED | OUTPATIENT
Start: 2024-01-01 | End: 2024-01-01 | Stop reason: HOSPADM

## 2024-01-01 RX ORDER — PHENYLEPHRINE HYDROCHLORIDE 10 MG/ML
INJECTION INTRAVENOUS
Status: DISCONTINUED | OUTPATIENT
Start: 2024-01-01 | End: 2024-03-25

## 2024-01-01 RX ORDER — HYDROMORPHONE HYDROCHLORIDE 2 MG/ML
0.5 INJECTION, SOLUTION INTRAMUSCULAR; INTRAVENOUS; SUBCUTANEOUS EVERY 6 HOURS PRN
Status: DISCONTINUED | OUTPATIENT
Start: 2024-01-01 | End: 2024-01-01

## 2024-01-01 RX ORDER — HYDRALAZINE HYDROCHLORIDE 20 MG/ML
10 INJECTION INTRAMUSCULAR; INTRAVENOUS EVERY 4 HOURS PRN
Status: DISCONTINUED | OUTPATIENT
Start: 2024-01-01 | End: 2024-01-01 | Stop reason: HOSPADM

## 2024-01-01 RX ORDER — DEXMEDETOMIDINE HYDROCHLORIDE 4 UG/ML
0-1.4 INJECTION, SOLUTION INTRAVENOUS CONTINUOUS
Status: DISCONTINUED | OUTPATIENT
Start: 2024-01-01 | End: 2024-01-01 | Stop reason: HOSPADM

## 2024-01-01 RX ORDER — TRAZODONE HYDROCHLORIDE 50 MG/1
50 TABLET ORAL NIGHTLY
Status: DISCONTINUED | OUTPATIENT
Start: 2024-01-01 | End: 2024-01-01 | Stop reason: HOSPADM

## 2024-01-01 RX ORDER — CALCIUM GLUCONATE 20 MG/ML
INJECTION, SOLUTION INTRAVENOUS
Status: COMPLETED
Start: 2024-01-01 | End: 2024-01-01

## 2024-01-01 RX ORDER — MIDAZOLAM HYDROCHLORIDE 1 MG/ML
INJECTION INTRAMUSCULAR; INTRAVENOUS
Status: DISCONTINUED | OUTPATIENT
Start: 2024-01-01 | End: 2024-01-01 | Stop reason: HOSPADM

## 2024-01-01 RX ORDER — LIDOCAINE HYDROCHLORIDE 10 MG/ML
INJECTION INFILTRATION; PERINEURAL
Status: DISCONTINUED | OUTPATIENT
Start: 2024-01-01 | End: 2024-01-01 | Stop reason: HOSPADM

## 2024-01-01 RX ORDER — GLUCAGON 1 MG
1 KIT INJECTION
Status: DISCONTINUED | OUTPATIENT
Start: 2024-01-01 | End: 2024-01-01 | Stop reason: HOSPADM

## 2024-01-01 RX ORDER — HYDRALAZINE HYDROCHLORIDE 50 MG/1
50 TABLET, FILM COATED ORAL EVERY 8 HOURS
Status: DISCONTINUED | OUTPATIENT
Start: 2024-01-01 | End: 2024-01-01

## 2024-01-01 RX ORDER — HYDROMORPHONE HYDROCHLORIDE 2 MG/ML
1 INJECTION, SOLUTION INTRAMUSCULAR; INTRAVENOUS; SUBCUTANEOUS
Status: COMPLETED | OUTPATIENT
Start: 2024-01-01 | End: 2024-01-01

## 2024-01-01 RX ORDER — DOBUTAMINE HYDROCHLORIDE 400 MG/100ML
2.5 INJECTION INTRAVENOUS CONTINUOUS
Status: DISCONTINUED | OUTPATIENT
Start: 2024-01-01 | End: 2024-01-01 | Stop reason: HOSPADM

## 2024-01-01 RX ORDER — NOREPINEPHRINE BITARTRATE/D5W 8 MG/250ML
0-3 PLASTIC BAG, INJECTION (ML) INTRAVENOUS CONTINUOUS
Status: DISCONTINUED | OUTPATIENT
Start: 2024-01-01 | End: 2024-01-01 | Stop reason: HOSPADM

## 2024-01-01 RX ORDER — MUPIROCIN 20 MG/G
OINTMENT TOPICAL 2 TIMES DAILY
Status: DISCONTINUED | OUTPATIENT
Start: 2024-01-01 | End: 2024-01-01

## 2024-01-01 RX ORDER — SODIUM CHLORIDE 0.9 % (FLUSH) 0.9 %
10 SYRINGE (ML) INJECTION EVERY 6 HOURS
Status: DISCONTINUED | OUTPATIENT
Start: 2024-01-01 | End: 2024-01-01 | Stop reason: HOSPADM

## 2024-01-01 RX ORDER — FUROSEMIDE 40 MG/1
40 TABLET ORAL DAILY
Status: DISCONTINUED | OUTPATIENT
Start: 2024-01-01 | End: 2024-01-01 | Stop reason: HOSPADM

## 2024-01-01 RX ORDER — ALLOPURINOL 100 MG/1
100 TABLET ORAL NIGHTLY
Status: DISCONTINUED | OUTPATIENT
Start: 2024-01-01 | End: 2024-01-01 | Stop reason: HOSPADM

## 2024-01-01 RX ORDER — GUAIFENESIN 600 MG/1
600 TABLET, EXTENDED RELEASE ORAL 2 TIMES DAILY
Status: DISCONTINUED | OUTPATIENT
Start: 2024-01-01 | End: 2024-01-01 | Stop reason: HOSPADM

## 2024-01-01 RX ORDER — ALUMINUM HYDROXIDE, MAGNESIUM HYDROXIDE, AND SIMETHICONE 1200; 120; 1200 MG/30ML; MG/30ML; MG/30ML
30 SUSPENSION ORAL 4 TIMES DAILY PRN
Status: DISCONTINUED | OUTPATIENT
Start: 2024-01-01 | End: 2024-01-01 | Stop reason: HOSPADM

## 2024-01-01 RX ORDER — SODIUM CHLORIDE 9 MG/ML
INJECTION, SOLUTION INTRAVENOUS ONCE
Status: COMPLETED | OUTPATIENT
Start: 2024-01-01 | End: 2024-01-01

## 2024-01-01 RX ORDER — DEXAMETHASONE SODIUM PHOSPHATE 4 MG/ML
INJECTION, SOLUTION INTRA-ARTICULAR; INTRALESIONAL; INTRAMUSCULAR; INTRAVENOUS; SOFT TISSUE
Status: DISCONTINUED | OUTPATIENT
Start: 2024-01-01 | End: 2024-03-25

## 2024-01-01 RX ORDER — DEXAMETHASONE SODIUM PHOSPHATE 4 MG/ML
8 INJECTION, SOLUTION INTRA-ARTICULAR; INTRALESIONAL; INTRAMUSCULAR; INTRAVENOUS; SOFT TISSUE ONCE
Status: DISCONTINUED | OUTPATIENT
Start: 2024-01-01 | End: 2024-01-01

## 2024-01-01 RX ORDER — ROCURONIUM BROMIDE 10 MG/ML
INJECTION, SOLUTION INTRAVENOUS CODE/TRAUMA/SEDATION MEDICATION
Status: DISCONTINUED | OUTPATIENT
Start: 2024-01-01 | End: 2024-01-01 | Stop reason: HOSPADM

## 2024-01-01 RX ORDER — PANTOPRAZOLE SODIUM 40 MG/10ML
40 INJECTION, POWDER, LYOPHILIZED, FOR SOLUTION INTRAVENOUS ONCE
Status: COMPLETED | OUTPATIENT
Start: 2024-01-01 | End: 2024-01-01

## 2024-01-01 RX ORDER — NALOXONE HCL 0.4 MG/ML
0.02 VIAL (ML) INJECTION
Status: DISCONTINUED | OUTPATIENT
Start: 2024-01-01 | End: 2024-01-01 | Stop reason: HOSPADM

## 2024-01-01 RX ORDER — DIPHENHYDRAMINE HYDROCHLORIDE 50 MG/ML
25 INJECTION INTRAMUSCULAR; INTRAVENOUS EVERY 6 HOURS PRN
Status: DISCONTINUED | OUTPATIENT
Start: 2024-01-01 | End: 2024-01-01 | Stop reason: HOSPADM

## 2024-01-01 RX ORDER — FLUTICASONE FUROATE AND VILANTEROL 100; 25 UG/1; UG/1
1 POWDER RESPIRATORY (INHALATION) DAILY
Status: DISCONTINUED | OUTPATIENT
Start: 2024-01-01 | End: 2024-01-01 | Stop reason: HOSPADM

## 2024-01-01 RX ORDER — ALBUMIN HUMAN 250 G/1000ML
SOLUTION INTRAVENOUS
Status: DISCONTINUED | OUTPATIENT
Start: 2024-01-01 | End: 2024-03-25

## 2024-01-01 RX ORDER — HYDROMORPHONE HYDROCHLORIDE 2 MG/ML
0.5 INJECTION, SOLUTION INTRAMUSCULAR; INTRAVENOUS; SUBCUTANEOUS ONCE
Status: COMPLETED | OUTPATIENT
Start: 2024-01-01 | End: 2024-01-01

## 2024-01-01 RX ADMIN — BENZOCAINE: 220 SPRAY, METERED PERIODONTAL at 03:03

## 2024-01-01 RX ADMIN — CETIRIZINE HYDROCHLORIDE 10 MG: 10 TABLET, FILM COATED ORAL at 09:03

## 2024-01-01 RX ADMIN — ALLOPURINOL 100 MG: 100 TABLET ORAL at 10:03

## 2024-01-01 RX ADMIN — FENTANYL CITRATE 25 MCG: 50 INJECTION, SOLUTION INTRAMUSCULAR; INTRAVENOUS at 12:03

## 2024-01-01 RX ADMIN — PIPERACILLIN AND TAZOBACTAM 4.5 G: 4; .5 INJECTION, POWDER, LYOPHILIZED, FOR SOLUTION INTRAVENOUS; PARENTERAL at 03:03

## 2024-01-01 RX ADMIN — ONDANSETRON 4 MG: 2 INJECTION INTRAMUSCULAR; INTRAVENOUS at 03:03

## 2024-01-01 RX ADMIN — SODIUM CHLORIDE: 9 INJECTION, SOLUTION INTRAVENOUS at 11:03

## 2024-01-01 RX ADMIN — KETOROLAC TROMETHAMINE 15 MG: 30 INJECTION, SOLUTION INTRAMUSCULAR at 08:03

## 2024-01-01 RX ADMIN — Medication 10 ML: at 12:03

## 2024-01-01 RX ADMIN — DEXAMETHASONE SODIUM PHOSPHATE 8 MG: 4 INJECTION, SOLUTION INTRA-ARTICULAR; INTRALESIONAL; INTRAMUSCULAR; INTRAVENOUS; SOFT TISSUE at 03:03

## 2024-01-01 RX ADMIN — ALLOPURINOL 100 MG: 100 TABLET ORAL at 08:03

## 2024-01-01 RX ADMIN — ATORVASTATIN CALCIUM 20 MG: 10 TABLET, FILM COATED ORAL at 08:03

## 2024-01-01 RX ADMIN — KETOROLAC TROMETHAMINE 15 MG: 30 INJECTION, SOLUTION INTRAMUSCULAR at 05:03

## 2024-01-01 RX ADMIN — ROCURONIUM BROMIDE 50 MG: 10 SOLUTION INTRAVENOUS at 11:03

## 2024-01-01 RX ADMIN — ACETAMINOPHEN 1000 MG: 10 INJECTION, SOLUTION INTRAVENOUS at 01:03

## 2024-01-01 RX ADMIN — ENOXAPARIN SODIUM 30 MG: 30 INJECTION SUBCUTANEOUS at 05:03

## 2024-01-01 RX ADMIN — HYDROMORPHONE HYDROCHLORIDE 0.5 MG: 2 INJECTION INTRAMUSCULAR; INTRAVENOUS; SUBCUTANEOUS at 04:03

## 2024-01-01 RX ADMIN — ATORVASTATIN CALCIUM 20 MG: 10 TABLET, FILM COATED ORAL at 10:03

## 2024-01-01 RX ADMIN — DOBUTAMINE HYDROCHLORIDE 5 MCG/KG/MIN: 400 INJECTION INTRAVENOUS at 04:03

## 2024-01-01 RX ADMIN — FLUTICASONE FUROATE AND VILANTEROL TRIFENATATE 1 PUFF: 100; 25 POWDER RESPIRATORY (INHALATION) at 09:03

## 2024-01-01 RX ADMIN — ACETAMINOPHEN 1000 MG: 500 TABLET ORAL at 09:03

## 2024-01-01 RX ADMIN — FENTANYL CITRATE 100 MCG: 50 INJECTION, SOLUTION INTRAMUSCULAR; INTRAVENOUS at 03:03

## 2024-01-01 RX ADMIN — HYDROMORPHONE HYDROCHLORIDE 0.4 MG: 2 INJECTION INTRAMUSCULAR; INTRAVENOUS; SUBCUTANEOUS at 02:03

## 2024-01-01 RX ADMIN — NOREPINEPHRINE BITARTRATE 0.28 MCG/KG/MIN: 8 INJECTION, SOLUTION INTRAVENOUS at 12:03

## 2024-01-01 RX ADMIN — ALBUMIN (HUMAN) 100 ML: 12.5 SOLUTION INTRAVENOUS at 12:03

## 2024-01-01 RX ADMIN — GABAPENTIN 300 MG: 300 CAPSULE ORAL at 05:03

## 2024-01-01 RX ADMIN — ONDANSETRON 4 MG: 2 INJECTION INTRAMUSCULAR; INTRAVENOUS at 02:03

## 2024-01-01 RX ADMIN — MIDAZOLAM HYDROCHLORIDE 2 MG: 1 INJECTION, SOLUTION INTRAMUSCULAR; INTRAVENOUS at 04:03

## 2024-01-01 RX ADMIN — SUCCINYLCHOLINE CHLORIDE 80 MG: 20 INJECTION, SOLUTION INTRAMUSCULAR; INTRAVENOUS at 11:03

## 2024-01-01 RX ADMIN — ONDANSETRON 4 MG: 2 INJECTION INTRAMUSCULAR; INTRAVENOUS at 10:03

## 2024-01-01 RX ADMIN — SODIUM CHLORIDE, POTASSIUM CHLORIDE, SODIUM LACTATE AND CALCIUM CHLORIDE 500 ML: 600; 310; 30; 20 INJECTION, SOLUTION INTRAVENOUS at 02:03

## 2024-01-01 RX ADMIN — DIGOXIN 300 MCG: 0.25 INJECTION INTRAMUSCULAR; INTRAVENOUS at 03:03

## 2024-01-01 RX ADMIN — CLINDAMYCIN PHOSPHATE 900 MG: 900 INJECTION, SOLUTION INTRAVENOUS at 12:03

## 2024-01-01 RX ADMIN — SODIUM CHLORIDE: 9 INJECTION, SOLUTION INTRAVENOUS at 12:03

## 2024-01-01 RX ADMIN — HYDROMORPHONE HYDROCHLORIDE 0.4 MG: 2 INJECTION INTRAMUSCULAR; INTRAVENOUS; SUBCUTANEOUS at 01:03

## 2024-01-01 RX ADMIN — SODIUM CHLORIDE: 9 INJECTION, SOLUTION INTRAVENOUS at 10:03

## 2024-01-01 RX ADMIN — PROPOFOL 100 MG: 10 INJECTION, EMULSION INTRAVENOUS at 11:03

## 2024-01-01 RX ADMIN — HYDROMORPHONE HYDROCHLORIDE 0.5 MG: 2 INJECTION, SOLUTION INTRAMUSCULAR; INTRAVENOUS; SUBCUTANEOUS at 07:03

## 2024-01-01 RX ADMIN — PANTOPRAZOLE SODIUM 40 MG: 40 INJECTION, POWDER, LYOPHILIZED, FOR SOLUTION INTRAVENOUS at 10:03

## 2024-01-01 RX ADMIN — SODIUM BICARBONATE: 84 INJECTION, SOLUTION INTRAVENOUS at 08:03

## 2024-01-01 RX ADMIN — SODIUM CHLORIDE 250 ML: 9 INJECTION, SOLUTION INTRAVENOUS at 11:03

## 2024-01-01 RX ADMIN — KETOROLAC TROMETHAMINE 15 MG: 30 INJECTION, SOLUTION INTRAMUSCULAR at 04:03

## 2024-01-01 RX ADMIN — ONDANSETRON 4 MG: 2 INJECTION INTRAMUSCULAR; INTRAVENOUS at 05:03

## 2024-01-01 RX ADMIN — LEUCINE, PHENYLALANINE, LYSINE, METHIONINE, ISOLEUCINE, VALINE, HISTIDINE, THREONINE, TRYPTOPHAN, ALANINE, GLYCINE, ARGININE, PROLINE, SERINE, TYROSINE, SODIUM ACETATE, DIBASIC POTASSIUM PHOSPHATE, MAGNESIUM CHLORIDE, SODIUM CHLORIDE, CALCIUM CHLORIDE, DEXTROSE
311; 238; 247; 170; 255; 247; 204; 179; 77; 880; 438; 489; 289; 213; 17; 297; 261; 51; 77; 33; 5 INJECTION INTRAVENOUS at 01:03

## 2024-01-01 RX ADMIN — AMITRIPTYLINE HYDROCHLORIDE 25 MG: 25 TABLET, FILM COATED ORAL at 08:03

## 2024-01-01 RX ADMIN — VASOPRESSIN 0.04 UNITS/MIN: 20 INJECTION, SOLUTION INTRAMUSCULAR; SUBCUTANEOUS at 01:03

## 2024-01-01 RX ADMIN — SODIUM BICARBONATE: 84 INJECTION, SOLUTION INTRAVENOUS at 02:03

## 2024-01-01 RX ADMIN — KETOROLAC TROMETHAMINE 15 MG: 30 INJECTION, SOLUTION INTRAMUSCULAR at 10:03

## 2024-01-01 RX ADMIN — KETOROLAC TROMETHAMINE 15 MG: 30 INJECTION, SOLUTION INTRAMUSCULAR at 11:03

## 2024-01-01 RX ADMIN — LIDOCAINE HYDROCHLORIDE 60 MG: 20 INJECTION INTRAVENOUS at 11:03

## 2024-01-01 RX ADMIN — DEXTROSE MONOHYDRATE 125 ML: 100 INJECTION, SOLUTION INTRAVENOUS at 03:03

## 2024-01-01 RX ADMIN — HYDROMORPHONE HYDROCHLORIDE 1 MG: 2 INJECTION, SOLUTION INTRAMUSCULAR; INTRAVENOUS; SUBCUTANEOUS at 03:03

## 2024-01-01 RX ADMIN — ALBUMIN (HUMAN) 100 ML: 12.5 SOLUTION INTRAVENOUS at 11:03

## 2024-01-01 RX ADMIN — CLOPIDOGREL BISULFATE 75 MG: 75 TABLET ORAL at 05:03

## 2024-01-01 RX ADMIN — ACETAMINOPHEN 1000 MG: 10 INJECTION, SOLUTION INTRAVENOUS at 09:03

## 2024-01-01 RX ADMIN — PHENYLEPHRINE HYDROCHLORIDE 100 MCG: 10 INJECTION INTRAVENOUS at 11:03

## 2024-01-01 RX ADMIN — TRAZODONE HYDROCHLORIDE 50 MG: 50 TABLET ORAL at 08:03

## 2024-01-01 RX ADMIN — TRAZODONE HYDROCHLORIDE 50 MG: 50 TABLET ORAL at 10:03

## 2024-01-01 RX ADMIN — SUGAMMADEX 200 MG: 100 INJECTION, SOLUTION INTRAVENOUS at 12:03

## 2024-01-01 RX ADMIN — ACETAMINOPHEN 1000 MG: 10 INJECTION, SOLUTION INTRAVENOUS at 03:03

## 2024-01-01 RX ADMIN — ACETAMINOPHEN 1000 MG: 500 TABLET ORAL at 10:03

## 2024-01-01 RX ADMIN — DIPHENHYDRAMINE HYDROCHLORIDE 25 MG: 50 INJECTION INTRAMUSCULAR; INTRAVENOUS at 07:03

## 2024-01-01 RX ADMIN — ACETAMINOPHEN 1000 MG: 500 TABLET ORAL at 01:03

## 2024-01-01 RX ADMIN — KETOROLAC TROMETHAMINE 15 MG: 30 INJECTION, SOLUTION INTRAMUSCULAR at 06:03

## 2024-01-01 RX ADMIN — CALCIUM GLUCONATE 1 G: 20 INJECTION, SOLUTION INTRAVENOUS at 07:03

## 2024-01-01 RX ADMIN — DEXAMETHASONE SODIUM PHOSPHATE 4 MG: 4 INJECTION, SOLUTION INTRA-ARTICULAR; INTRALESIONAL; INTRAMUSCULAR; INTRAVENOUS; SOFT TISSUE at 11:03

## 2024-01-01 RX ADMIN — CALCIUM GLUCONATE 1 G: 20 INJECTION, SOLUTION INTRAVENOUS at 02:03

## 2024-01-01 RX ADMIN — NOREPINEPHRINE BITARTRATE 8 MG: 8 INJECTION, SOLUTION INTRAVENOUS at 03:03

## 2024-01-01 RX ADMIN — PANTOPRAZOLE SODIUM 40 MG: 40 TABLET, DELAYED RELEASE ORAL at 10:03

## 2024-01-01 RX ADMIN — ONDANSETRON 4 MG: 2 INJECTION INTRAMUSCULAR; INTRAVENOUS at 11:03

## 2024-01-01 RX ADMIN — SODIUM CHLORIDE, POTASSIUM CHLORIDE, SODIUM LACTATE AND CALCIUM CHLORIDE 1000 ML: 600; 310; 30; 20 INJECTION, SOLUTION INTRAVENOUS at 03:03

## 2024-01-01 RX ADMIN — DOBUTAMINE HYDROCHLORIDE 2.5 MCG/KG/MIN: 400 INJECTION INTRAVENOUS at 07:03

## 2024-01-01 RX ADMIN — PROPOFOL 15 MCG/KG/MIN: 10 INJECTION, EMULSION INTRAVENOUS at 04:03

## 2024-01-01 RX ADMIN — ROCURONIUM BROMIDE 100 MG: 10 INJECTION, SOLUTION INTRAVENOUS at 04:03

## 2024-01-01 RX ADMIN — PANTOPRAZOLE SODIUM 40 MG: 40 INJECTION, POWDER, LYOPHILIZED, FOR SOLUTION INTRAVENOUS at 08:03

## 2024-01-01 RX ADMIN — PANTOPRAZOLE SODIUM 40 MG: 40 TABLET, DELAYED RELEASE ORAL at 08:03

## 2024-01-01 RX ADMIN — AMITRIPTYLINE HYDROCHLORIDE 25 MG: 25 TABLET, FILM COATED ORAL at 10:03

## 2024-01-01 RX ADMIN — PROPOFOL 30 MCG/KG/MIN: 10 INJECTION, EMULSION INTRAVENOUS at 10:03

## 2024-01-01 RX ADMIN — SODIUM CHLORIDE: 9 INJECTION, SOLUTION INTRAVENOUS at 07:03

## 2024-01-01 RX ADMIN — ONDANSETRON 4 MG: 2 INJECTION INTRAMUSCULAR; INTRAVENOUS at 07:03

## 2024-01-01 RX ADMIN — GUAIFENESIN 600 MG: 600 TABLET, EXTENDED RELEASE ORAL at 10:03

## 2024-01-01 RX ADMIN — VASOPRESSIN 0.04 UNITS/MIN: 20 INJECTION, SOLUTION INTRAMUSCULAR; SUBCUTANEOUS at 10:03

## 2024-01-01 RX ADMIN — FENTANYL CITRATE 100 MCG: 50 INJECTION, SOLUTION INTRAMUSCULAR; INTRAVENOUS at 11:03

## 2024-01-01 RX ADMIN — ACETAMINOPHEN 1000 MG: 500 TABLET ORAL at 06:03

## 2024-01-01 RX ADMIN — ONDANSETRON 4 MG: 2 INJECTION INTRAMUSCULAR; INTRAVENOUS at 12:03

## 2024-01-31 NOTE — TELEPHONE ENCOUNTER
"JEFFREY Ybarra called yesterday to refer Ms. Zambrano with access dysfunction. The nurse reports during cannulation, the needle was not able to advance and had a lot of difficulty sticking. There is a "hard" area of the graft in the cannulation zone. After only a single cannulation attempt, they were NOT able to dialyze her yesterday. Recent clearance was 1.6. Contacted the patient for scheduling-she reports that she will go back to dialysis tomorrow (Thurs.) -even though she's usually a Tues/Sat treatment schedule. Ms. Zambrano is convinced that it was "they way she stuck me". Patient agreed to schedule evaluation and procedure for Friday. However we will contact the HD center tomorrow morning to see how treatment goes. If treatment goes well without issues then patient expressed wanting to cancel evaluation. If cannulation issues persist, then we will need to plan for procedure Friday. Patient agrees with plan. NJ  "

## 2024-02-01 NOTE — TELEPHONE ENCOUNTER
"Following up with treatment today, Ms. Zambrano is able to run today without issues. They cannulated around the "hard spot". Center still wants patient's access to be evaluated. Again, clearance is 1.6, no arm swelling, and no other issues mentioned besides the cannulation issues.     Patient was contacted with plan for evaluation FIRST, possibly with ultrasound and will determine need for procedure. She agreed with plan and was given date, time, and pre op instructions. NJ  "

## 2024-02-05 PROBLEM — T82.590A MECHANICAL COMPLICATION OF ARTERIOVENOUS FISTULA SURGICALLY CREATED: Status: ACTIVE | Noted: 2024-01-01

## 2024-02-05 NOTE — PROCEDURES
INTERVENTIONAL NEPHROLOGY PROCEDURE NOTE: FISTULOGRAM/GRAFTOGRAM         Patient Name: Deepti Zambrano  JULIANNA 1945    Procedure Date:    2024    Performing Physician:   Dr. Erwin    Access History: Pt is with ESRD on HD typically via left brachioaxillary arteriovenous graft who presents today with difficult cannulations.    Pre-Op Diagnosis: T82.590A Mechanical complication of surgically created arteriovenous shunt, initial encounter, N18.6 End Stage Renal Disease (ESRD)  Post-Op Diagnosis: Same    Procedure: Fistulogram with possible angioplasty and stent placement of  left brachioaxillary arteriovenous graft.    Indication: Nonfunctional/Dysfunctional/Malfunctioning HD access.    Informed Consent:  The patient was evaluated in the pre-operative area with assessment including the American Society of Anesthesia risk classification. The procedure is discussed in detail including risks, benefits alternatives and options and the patient agrees to proceed. Informed consent was obtained from the patient.     Maximum sterile barrier technique: The patient was prepped and draped using chlorhexidine prep and maximum sterile barrier technique.    Sedation Note:  Risks and benefits of sedation were reviewed with the patient or surrogate, including bleeding, infection, nausea, vomiting, dizziness, instability, damage to a nerve, damage to a blood vessel, cellulitis, reaction to medications, amnesia, loss of consciousness, respiratory arrest, cardiac arrest.     The patient received the following medications: Versed 0.5 mg IV and Fentanyl 25 mcg IV; patient did remain alert, responsive, and conversational throughout the procedure. I was personally responsible for supervising the administration of moderate sedation services during the procedure performed and I affirm all the guidelines and requirements described in the CPT 2024 section on moderate sedation were followed, including the use of an  independent trained observer who had no other duties during the procedure. The total face-to-face time was 10 minutes.    Procedure Steps:     The patient was prepped and draped in sterile fashion. Procedure ultrasound revealed patent vascular HD access.    Local anesthesia was administered by injecting 1% lidocaine at the intended site of cannulation. With use of live ultrasonographic visualization, the vascular access was successfully cannulated with a mini-stick needle aimed towards the outflow (image saved to chart). After blood flashback was noted, the mini-stick wire was advanced through the needle. Via Seldinger technique, the needle was exchanged for the mini-stick sheath.     Angiogram was performed under fluoroscopy which revealed no significant lesion throughout the AV access and central vasculature.    Sheath was removed and hemostasis was achieved using a purse-string stitch and light digital pressure at the site of cannulation.    ASSESSMENT/PLAN:  - Successful shuntogram without findings of stenosis.    EBL: 2 ml    Contrast: 5 ml    Complications: None    Post-op Instructions: The patient was given both verbal and written post-op instructions. If excessive bleeding at the site, they have been instructed to call their physician or proceed to a local emergency room.    Orders to the dialysis unit: OK to use access for dialysis needs.    Thank you for allowing me the opportunity in taking care of this patient. Please reach me with any questions.    Pradip Erwin DO  Interventional Nephrology  Cell: [phone]

## 2024-02-05 NOTE — DISCHARGE INSTRUCTIONS
-Remove dressing in 24hrs  -No driving for today  -Do not lift anything heavier than a gallon of milk for 5 days  -No lotions, powders, creams around site for 5 days  - Return to the nearest emergency room if you start running a fever; have any kind of discharge coming from the site, the site looks red or swollen.  - If site starts to bleed, lay flat on the ground, apply pressure to the site and call 911.

## 2024-02-05 NOTE — DISCHARGE SUMMARY
INTERVENTIONAL NEPHROLOGY DISCHARGE SUMMARY         Patient Name: Deepti Zambrano   1945    Procedure Date: 2024      In brief, Ms. Zambrano underwent graftogram of her L BA AVG due to difficult cannulations. Angiogram did not reveal a stenosis that was notable for treatment. There does appear to be a  small 10% stenosis in the distal graft, but otherwise there was no other signs of stenosis.    I feel that difficulty in cannulation is likely related to loose skin overlying the graft. The graft appears to be relatively mobile under the skin. I recommend attempt in cannulations by stabilizing the graft between the index finger and thumb of the other hand while the cannulating hand guides the needle (as usual). The pt states that some cannulators that are successful at the unit stretch her skin in order to stabilize the graft, which is also a good strategy.    Pre-Op Diagnosis: T82.590A Mechanical complication of surgically created arteriovenous shunt, initial encounter, N18.6 End Stage Renal Disease (ESRD)  Post-Op Diagnosis: Same.    Discharge Instructions/Recommendations:  - Continue use of graft. See suggestions on cannulation above. Stabilizing the graft with the non-cannulating hand may improve successful cannulations.  - Pt may be discharged after successful monitoring in post-op area.  - Pt may resume home medications.    Thank you for allowing me the opportunity in taking care of this patient. Please reach me with any questions.    Pradip Erwin DO  Interventional Nephrology  Cell: [phone]

## 2024-02-05 NOTE — Clinical Note
5 ml of contrast were injected throughout the case. 25 mL of contrast was the total wasted during the case. 30 mL was the total amount used during the case.

## 2024-02-08 NOTE — TELEPHONE ENCOUNTER
Ms. Zambrano called our office today asking if the center can use a tourniquet for accessing the AVG. I told her that would be fine to use a lightly bound tourniquet just for cannulation. Tourniquet must be removed prior to treatment starting. Patient asked us to contact her center. I refaxed our recent Post op summary and called JEFFREY Ybarra, however the nurse today wasn't familiar with this patient. I will call back tomorrow to report this information to the center. NJ

## 2024-02-12 NOTE — TELEPHONE ENCOUNTER
Contacted JEFFREY Ybarra for an update on how cannulations are going. Augustina reports that every tech has an issues with cannulations. The techs complain that Ms. Zambrano dos not hold still during cannulations and jumps back making it very difficult for the tech to gain adequate needle placement. Augustina also says that the patient is very resistant to letting them adjust the needles once they are in. Augustina reports recent clearance of 1.45 and there are no functional issues. She will follow up as needed. NJ

## 2024-03-20 NOTE — ED NOTES
NG placement x-ray shows coiling in esophagus, discussed w/ ED providers. Due to past multiple attempts, will not remove and re-advance at this time. Will call sx for further recommendations. Pt in no acute distress at this time, blood-tinged, clear fluid noted in tube. Pt and family updated at this time.

## 2024-03-20 NOTE — CONSULTS
Acute Care Surgery   Consult    Patient Name: Deepti Zambrano  YOB: 1945  Date: 2024 4:06 PM  Date of Admission: 3/20/2024  HD#0  POD#* No surgery found *    PRESENTING HISTORY   Chief Complaint/Reason for Admission: Concern for small bowel obstruction    History of Present Illness:  Patient is 78 y F with history CAD status post coronary stent, renal cancer status post nephrectomy, ESRD on dialysis via left upper extremity access, hypertension, COPD, previous  and cholecystectomy presenting with abdominal pain, nausea and vomiting since yesterday.  Patient states she last had a bowel movement 2 days ago which was smaller and more firm than normal and she began to use stool softeners without success.  She states most of her pain is in the upper abdomen and she continues to feel nauseous with relief from antiemetics given in the ED.    Review of Systems:  12 point ROS negative except as stated in HPI    PAST HISTORY:   Past medical history:  Past Medical History:   Diagnosis Date    Acid reflux     Anesthesia complication     trouble awaking    CAD (coronary artery disease)     CORONARY STENT    Chronic kidney disease, unspecified     COPD (chronic obstructive pulmonary disease)     COVID-19     week of Mothers Day     Dialysis patient     CSI tues AND sat  chair time 0630    Epigastric pain     GERD (gastroesophageal reflux disease)     GI bleed     Gout     High cholesterol     Hx of bladder cancer     Hypertension     Kidney cancer, primary, with metastasis from kidney to other site, right     Melena     Muscle weakness of extremity     bilateral lower    Pneumonia     Post-COVID chronic cough     Sciatic nerve pain     SOB (shortness of breath) on exertion     Stroke     , small memory loss with  left sided weakness       Past surgical history:  Past Surgical History:   Procedure Laterality Date    AV FISTULA PLACEMENT Left     BACK SURGERY      BCG for cancer       CATARACT EXTRACTION W/  INTRAOCULAR LENS IMPLANT Bilateral     CHOLECYSTECTOMY      COLONOSCOPY      COLONOSCOPY, THROUGH STOMA, WITH HEMORRHAGE CONTROL  11/22/2023    Procedure: COLONOSCOPY, THROUGH STOMA, WITH HEMORRHAGE CONTROL;  Surgeon: German Cottrell MD;  Location: [facility] ENDOSCOPY;  Service: Gastroenterology;;    COLONOSCOPY, WITH POLYPECTOMY USING SNARE N/A 11/22/2023    Procedure: COLON;  Surgeon: German Cottrell MD;  Location: [facility] ENDOSCOPY;  Service: Gastroenterology;  Laterality: N/A;    CORONARY ANGIOPLASTY WITH STENT PLACEMENT      Dr. Strickland 18 years ago    DIAGNOSTIC LAPAROSCOPY N/A 07/28/2022    Procedure: LAPAROSCOPY, DIAGNOSTIC;  Surgeon: Edmund Echols Jr., MD;  Location: [facility];  Service: General;  Laterality: N/A;    DIAGNOSTIC LAPAROSCOPY N/A 05/30/2023    Procedure: LAPAROSCOPY, DIAGNOSTIC;  Surgeon: Edmund Echols Jr., MD;  Location: [facility];  Service: General;  Laterality: N/A;    ESOPHAGOGASTRODUODENOSCOPY N/A 08/25/2023    Procedure: EGD;  Surgeon: Elan Leigh MD;  Location: [facility] ENDOSCOPY;  Service: Gastroenterology;  Laterality: N/A;    ESOPHAGOGASTRODUODENOSCOPY N/A 11/22/2023    Procedure: DOUBLE;  Surgeon: German Cottrell MD;  Location: [facility] ENDOSCOPY;  Service: Gastroenterology;  Laterality: N/A;    FISTULOGRAM Left 2/5/2024    Procedure: Fistulogram;  Surgeon: Pradip Erwin DO;  Location: [facility] CATH LAB;  Service: Nephrology;  Laterality: Left;    HERNIA REPAIR      HYSTERECTOMY      LAPAROSCOPIC REVISION OF PROCEDURE INVOLVING PERITONEAL DIALYSIS CATHETER  05/30/2023    Procedure: REVISION OF PROCEDURE INVOLVING PERITONEAL DIALYSIS CATHETER, LAPAROSCOPIC;  Surgeon: Edmund Echols Jr., MD;  Location: [facility];  Service: General;;    NECK SURGERY      cervical    PERITONEAL CATHETER INSERTION      REMOVAL, CATHETER, DIALYSIS, PERITONEAL, LAPAROSCOPIC N/A 09/28/2023    Procedure: REMOVAL, CATHETER, DIALYSIS, PERITONEAL,  LAPAROSCOPIC;  Surgeon: Edmund Echols Jr., MD;  Location: [facility];  Service: General;  Laterality: N/A;  LAP VENTRAL HERNIA REPAIR NOT PERFORMED DUE TO MESH IN PLACE, NOT NEEDED     right kidney removed Right     TONSILLECTOMY         Family history:  Family History   Problem Relation Age of Onset    Cancer Mother     Heart attack Father        Social history:  Social History     Socioeconomic History    Marital status:    Tobacco Use    Smoking status: Every Day     Current packs/day: 1.00     Types: Cigarettes, Vaping with nicotine     Start date: 2022    Smokeless tobacco: Never   Substance and Sexual Activity    Alcohol use: Not Currently     Alcohol/week: 2.0 standard drinks of alcohol     Types: 1 Glasses of wine, 1 Cans of beer per week     Comment: occasionally    Drug use: Never    Sexual activity: Not Currently     Social History     Tobacco Use   Smoking Status Every Day    Current packs/day: 1.00    Types: Cigarettes, Vaping with nicotine    Start date: 2022   Smokeless Tobacco Never      Social History     Substance and Sexual Activity   Alcohol Use Not Currently    Alcohol/week: 2.0 standard drinks of alcohol    Types: 1 Glasses of wine, 1 Cans of beer per week    Comment: occasionally        MEDICATIONS & ALLERGIES:     No current facility-administered medications on file prior to encounter.     Current Outpatient Medications on File Prior to Encounter   Medication Sig    allopurinoL (ZYLOPRIM) 100 MG tablet Take 100 mg by mouth nightly.    amitriptyline (ELAVIL) 25 MG tablet Take 25 mg by mouth every evening.    amLODIPine (NORVASC) 10 MG tablet Take 10 mg by mouth nightly.    atorvastatin (LIPITOR) 40 MG tablet Take 40 mg by mouth every evening.    clopidogreL (PLAVIX) 75 mg tablet Take 75 mg by mouth once daily.    docusate sodium (COLACE) 50 MG capsule Take 150 mg by mouth every evening.    febuxostat (ULORIC) 40 mg Tab Take 40 mg by mouth once daily.    fluticasone-salmeterol diskus  "inhaler 100-50 mcg Inhale 1 puff into the lungs every 12 (twelve) hours. Controller    folic acid-B zisz-H-hhzgv-zinc (DIALYVITE 3000) 3-70-15 mg-mcg-mg Tab Take by mouth once daily.    furosemide (LASIX) 80 MG tablet Take 40 mg by mouth once daily.    gabapentin (NEURONTIN) 300 MG capsule Take 300 mg by mouth Daily.    hydrALAZINE (APRESOLINE) 50 MG tablet Take 50 mg by mouth every evening.    levocetirizine (XYZAL) 5 MG tablet Take 5 mg by mouth every evening.    magnesium oxide (MAG-OX) 400 mg (241.3 mg magnesium) tablet Take 400 mg by mouth 2 (two) times daily.    metoprolol succinate (TOPROL-XL) 50 MG 24 hr tablet Take 50 mg by mouth once daily.    pantoprazole (PROTONIX) 40 MG tablet Take 40 mg by mouth nightly.    psyllium 0.52 gram capsule Take 0.52 g by mouth once daily.    rOPINIRole (REQUIP) 0.25 MG tablet Take 0.25 mg by mouth every evening.    sodium bicarbonate 650 MG tablet Take 650 mg by mouth every evening.    traMADoL (ULTRAM) 50 mg tablet Take 1 tablet (50 mg total) by mouth every 6 (six) hours as needed for Pain.    traZODone (DESYREL) 50 MG tablet Take 50 mg by mouth every evening.    valsartan (DIOVAN) 160 MG tablet Take 160 mg by mouth once daily.       Allergies:   Review of patient's allergies indicates:   Allergen Reactions    Penicillins Itching and Hallucinations    Ciprofloxacin Itching    Codeine Itching       OBJECTIVE:   Vital Signs:  VITAL SIGNS: 24 HR MIN & MAX LAST   Temp  Min: 97.3 °F (36.3 °C)  Max: 97.6 °F (36.4 °C)  97.6 °F (36.4 °C)   BP  Min: 158/64  Max: 158/64  (!) 158/64    Pulse  Min: 92  Max: 92  92    Resp  Min: 18  Max: 19  19    SpO2  Min: 95 %  Max: 95 %  95 %      HT: 5' 1" (154.9 cm)  WT: 45.4 kg (100 lb)  BMI: 18.9     Intake/output:  Intake/Output - Last 3 Shifts       None          No intake or output data in the 24 hours ending 03/20/24 1606      Physical Exam:  General: elderly female in moderate distress  HEENT: Normocephalic, atraumatic, PERRL  CV: RR, LUE " "AV graft fistula with smooth thrill  Resp: NWOB  GI:  mild epigastric distension and tenderness to palpation, soft without rebound tenderness, palpable wall defect in RLQ  :  Deferred  MSK: moving all extremities spontaneously  Skin/wounds:  No rashes, ulcers, erythema  Neuro:  CNII-XII grossly intact, alert and oriented to person, place, and time    Labs:  Troponin:  No results for input(s): "TROPONINI" in the last 72 hours.  CBC:  Recent Labs     03/20/24  1333   WBC 13.21*   RBC 4.43   HGB 14.0   HCT 46.2      .3*   MCH 31.6*   MCHC 30.3*     CMP:  Recent Labs     03/20/24  1333   CALCIUM 10.4*   ALBUMIN 4.1      K 4.6   CO2 21*   BUN 34.1*   CREATININE 3.80*   ALKPHOS 92   ALT 18   AST 28   BILITOT 0.8     Lactic Acid:  No results for input(s): "LACTATE" in the last 72 hours.  ETOH:  No results for input(s): "ETHANOL" in the last 72 hours.   Urine Drug Screen:  No results for input(s): "COCAINE", "OPIATE", "BARBITURATE", "AMPHETAMINE", "FENTANYL", "CANNABINOIDS", "MDMA" in the last 72 hours.    Invalid input(s): "BENZODIAZEPINE", "PHENCYCLIDINE"   ABG:  No results for input(s): "PH", "PO2", "PCO2", "HCO3", "BE" in the last 168 hours.     Diagnostic Results:  CT Abdomen Pelvis  Without Contrast   Final Result      1. Findings most suggestive of small-bowel obstruction.   2. Small volume free intraperitoneal fluid.  Small pleural effusions.         Electronically signed by: Deepti Zapata   Date:    03/20/2024   Time:    14:37      XR Gastric tube check, non-radiologist performed    (Results Pending)       ASSESSMENT & PLAN:    78 y F with hx of multiple medical comorbidities including CAD s/p stent and ESRD on HD, significant surgical history including C section, Cholcystectomy, nephrectomy, PD cath placement and hernia repair presenting with acute onset nausea and vomiting, abdominal pain, and imaging concerning for small bowel obstruction. NGT being placed by ED for decompression.    - " No acute surgical intervention  - Agree with NG decompression to wall suction  - Surgery will continue follow     Sunil Keita MD PGY1

## 2024-03-20 NOTE — H&P
[facility] Hospital Medicine - H&P Note    Patient Name: Deepti Zambrano  : 1945  MRN: [id number]  PCP: Saleem Juan MD  Admitting Physician:  CAROL Lim MD  Nephrologist: Bao  Admission Class: IP- Inpatient   Code status: Full    Allergies   Penicillins, Ciprofloxacin, and Codeine    Chief Complaint   Abdominal pain    History of Present Illness   78 yr old female whose history includes CAD, COPD, ESRD on HD, and HTN. Presented to ED with c/o a one day history of abdominal pain, nausea and vomiting. Last bowel movement was 2 days ago and described as smaller and harder. Took stool softeners thinking she was constipated with no relief of symptoms. She's had multiple previous abdominal surgeries. Denies passing any flatus. Does outpatient hemodialysis on Tuesday and Saturday. Last HD was 3/19.     VS on arrival: T 97.3, P 92, R 18, B/P 158/64, Sats 95% on room air. Initial labs: wBC 13.2, Hgb 14, bicarb 21, CHRISTOPHER 34, Hct 3.8 and otherwise unremarkable. CT abdomen/pelvis without contrast showed findings of SBO, small volume free intraperitoneal fluid and small pleural effusions. Multiple attempts of NG tube placement without success.     ROS   Except as documented, all other systems reviewed and negative     Past Medical History   Coronary artery disease  COPD  ESRD on HD - Tuesday and Saturday at [facility] in [place] with Dr. Gore  Hypertension   CVA with residual left sided weakness  Hx bladder cancer - previously treated with BCG - in remission  Hx Renal cell carcinoma - S/P right nephrectomy  Dyslipidemia  Hx GI bleed  GERD  Gout  THUY - not on CPAP  Carotid artery stenosis - 40-59% left ICA - carotid US 22    Past Surgical History   Cholecystectomy  LUE AV fistula  Right nephrectomy - 2016  Neck surgery  Coronary stents - LCX  Hysterectomy  Hernia repair  Lumbar fusion  Cervical fusion  Peritoneal dialysis catheter - subsequently removed      Social  History   Current everyday smoker. Denies alcohol or illicit drug use.     Family History   Reviewed and negative    Home Medications     Prior to Admission medications    Medication Sig Start Date End Date Taking? Authorizing Provider   allopurinoL (ZYLOPRIM) 100 MG tablet Take 100 mg by mouth nightly. 5/25/22   Provider, Historical   amitriptyline (ELAVIL) 25 MG tablet Take 25 mg by mouth every evening. 5/9/22   Provider, Historical   amLODIPine (NORVASC) 10 MG tablet Take 10 mg by mouth nightly. 5/25/22   Provider, Historical   atorvastatin (LIPITOR) 20 MG tablet Take 20 mg by mouth every evening. 5/25/22   Provider, Historical   clopidogreL (PLAVIX) 75 mg tablet Take 75 mg by mouth once daily.    Provider, Historical   docusate sodium (COLACE) 50 MG capsule Take 150 mg by mouth every evening.    Provider, Historical                   folic acid-B bxsh-T-nbcsk-zinc (DIALYVITE 3000) 3-70-15 mg-mcg-mg Tab Take by mouth once daily.    Provider, Historical   furosemide (LASIX) 20 MG tablet Take 20 mg by mouth once daily. 5/9/22   Provider, Historical   gabapentin (NEURONTIN) 300 MG capsule Take 300 mg by mouth Daily.    Provider, Historical   hydrALAZINE (APRESOLINE) 50 MG tablet Take 50 mg by mouth TID    Provider, Historical   levocetirizine (XYZAL) 5 MG tablet Take 5 mg by mouth every evening.    Provider, Historical   magnesium oxide (MAG-OX) 400 mg (241.3 mg magnesium) tablet Take 400 mg by mouth 2 (two) times daily.    Provider, Historical   metoprolol succinate (TOPROL-XL) 50 MG 24 hr tablet Take 50 mg by mouth once daily. 5/7/22   Provider, Historical   pantoprazole (PROTONIX) 40 MG tablet Take 40 mg by mouth nightly. 5/25/22   Provider, Historical   psyllium 0.52 gram capsule Take 0.52 g by mouth once daily.    Provider, Historical           sodium bicarbonate 650 MG tablet Take 2 tabs BID    Provider, Historical           traZODone (DESYREL) 50 MG tablet Take 50 mg by mouth every evening.    Provider,  "Historical            Wixela 250/50 one puff BID    Physical Exam   Vital Signs  Temp:  [97.3 °F (36.3 °C)-97.6 °F (36.4 °C)]   Pulse:  [92]   Resp:  [18-19]   BP: (158)/(64)   SpO2:  [95 %]    General: Appears comfortable  HEENT: NC/AT  Neck:  No JVD  Chest: CTABL  CVS: Regular rhythm. Normal S1/S2.  Abdomen: nondistended, hypoactive BS, soft and non-tender.  MSK: No obvious deformity or joint swelling  Skin: Warm and dry  Neuro: AAOx3, no focal neurological deficit  Psych: Cooperative    Labs     Recent Labs     03/20/24  1333   WBC 13.21*   RBC 4.43   HGB 14.0   HCT 46.2   .3*   MCH 31.6*   MCHC 30.3*   RDW 19.1*        No results for input(s): "PROTIME", "INR", "PTT", "D-DIMER", "FERRITIN", "IRON", "TRANS", "TIBC", "LABIRON", "[id number]", "FOLATE", "LDH", "HAPTOGLOBIN", "RETICCNTAUTO", "RETABS", "PERIPSMEAREV" in the last 72 hours.   Recent Labs     03/20/24  1333      K 4.6   CHLORIDE 101   CO2 21*   BUN 34.1*   CREATININE 3.80*   EGFRNORACEVR 12   GLUCOSE 142*   CALCIUM 10.4*   MG 2.70*   ALBUMIN 4.1   GLOBULIN 3.5   ALKPHOS 92   ALT 18   AST 28   BILITOT 0.8   LIPASE 21     No results for input(s): "LACTIC" in the last 72 hours.  No results for input(s): "CPK", "TROPONINI" in the last 72 hours.     Microbiology Results (last 7 days)       ** No results found for the last 168 hours. **           Imaging   XR Gastric tube check, non-radiologist performed  Narrative: EXAMINATION:  XR GASTRIC TUBE CHECK, NON-RADIOLOGIST PERFORMED    CLINICAL HISTORY:  post placement;    COMPARISON:  None.    FINDINGS:  The nasogastric tube is looped over the esophagus, with the tip pointing in the incorrect direction  Impression: Nasogastric tube looped over the esophagus.  Repositioning is needed.    Electronically signed by: Rosibel John  Date:    03/20/2024  Time:    16:27  CT Abdomen Pelvis  Without Contrast  Narrative: EXAMINATION:  CT ABDOMEN PELVIS WITHOUT CONTRAST    CLINICAL HISTORY:  Abdominal " pain, acute, nonlocalized;    TECHNIQUE:  Helically acquired axial images, sagittal and coronal reformations were obtained from the lung bases to the pubic symphysis without the IV administration of contrast.    Automated tube current modulation, weight-based exposure dosing, and/or iterative reconstruction technique utilized to reach lowest reasonably achievable exposure rate.    DLP: 348 mGy*cm    COMPARISON:  CT abdomen pelvis 08/24/2023    FINDINGS:  HEART: There are coronary artery calcifications.    LUNG BASES: Small pleural effusions layering dependently.  Basilar atelectasis.    LIVER: Normal attenuation. No appreciable focal hepatic lesion.    BILIARY: Gallbladder is surgically absent.    PANCREAS: No inflammatory change.    SPLEEN: Normal in size    ADRENALS: No mass.    KIDNEYS/URETERS: Postop right nephrectomy.  No left hydronephrosis.  Unchanged appearance of the left kidney.    GI TRACT/MESENTERY: Evaluation of the bowel is limited without contrast. Small bowel loops are dilated measuring up to 3.6 cm in diameter and are fluid-filled with scattered air-fluid levels in small bowel feces sign compatible with delayed transit.  The terminal ileum is decompressed.  Transition point seen in the lower abdomen.  There is mesenteric edema.     Diverticulosis.    PERITONEUM: Small volume free fluid.  No free air seen.No free air.    LYMPH NODES: No enlarged lymph nodes by size criteria.    VASCULATURE: Aortoiliac atherosclerosis.    BLADDER: Normal appearance given degree of distention.    REPRODUCTIVE ORGANS: Postop hysterectomy.    ABDOMINAL WALL: Unremarkable.    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  Schmorl's node at L4.  Impression: 1. Findings most suggestive of small-bowel obstruction.  2. Small volume free intraperitoneal fluid.  Small pleural effusions.    Electronically signed by: Deepti Zapata  Date:    03/20/2024  Time:    14:37    Assessment & Plan     Small bowel obstruction  - unable to successfully place  NG tube - surgery recommends to try again if vomiting recurs otherwise will have IR or GI insert in AM  - appreciate surgery recommendations and no acute surgical intervention needed at this time  - Keep NPO  - PRN analgesics - will try to avoid opioids     ESRD on HD   - outpatient HD done on Tuesday and Saturday only. Had full run yesterday and no need for urgent HD at this time.   - consult Dr. Gore in AM for inpatient recommendations    Hypertension - stable  - PRN meds  - restart home meds as soon as tolerating PO    PMH: COPD, RCC, bladder cancer. GERD, Gout, CVA with residual left sided weakness      VTE Prophylaxis: SCDs     I, Sylvie Byers, FNP-BC have discussed this patients case with Dr. Lim who agrees with the diagnosis and treatment plan.      ________________________________________________________________________  I, Dr. Joshua Lim assumed care of this patient.  For the patient encounter, I performed the substantive portion of the visit, I reviewed the NPPA documentation, treatment plan, and medical decision making.  I had face to face time with this patient.  I have personally reviewed the labs and test results that are presently available. I have reviewed or attempted to review medical records based upon their availability. If patient was admitted under observational status it is with my approval.      Pleasant 78-year-old female with ESRD on HD, COPD presents with 2 days of no bowel movement, nausea and vomiting and abdominal discomfort.  Found to have likely small-bowel obstruction on imaging.  On exam she has a soft abdomen, mild diffuse tenderness, hypoactive bowel sounds.  Continue conservative manage now with IV fluids and bowel rest, surgery following.    Time seen:  9:00 p.m. 3/20  Critical care time: 35 min; Critical care diagnosis:  Small-bowel obstruction  Joshua Lim MD

## 2024-03-20 NOTE — ED PROVIDER NOTES
Encounter Date: 3/20/2024       History     Chief Complaint   Patient presents with    Abdominal Pain     C/o abdominal pain since last night. Endorses N/V and constipation. hx dialysis, states taking prune juice and lactulose with no relief.     See MDM for details     The history is provided by the patient.     Review of patient's allergies indicates:   Allergen Reactions    Penicillins Itching and Hallucinations    Ciprofloxacin Itching    Codeine Itching     Past Medical History:   Diagnosis Date    Acid reflux     Anesthesia complication     trouble awaking    CAD (coronary artery disease)     CORONARY STENT    Chronic kidney disease, unspecified     COPD (chronic obstructive pulmonary disease)     COVID-19     week of Mothers Day 2023    Dialysis patient     CSI tues AND sat  chair time 0630    Epigastric pain     GERD (gastroesophageal reflux disease)     GI bleed     Gout     High cholesterol     Hx of bladder cancer     Hypertension     Kidney cancer, primary, with metastasis from kidney to other site, right     Melena     Muscle weakness of extremity     bilateral lower    Pneumonia     Post-COVID chronic cough     Sciatic nerve pain     SOB (shortness of breath) on exertion     Stroke     1997, small memory loss with  left sided weakness     Past Surgical History:   Procedure Laterality Date    AV FISTULA PLACEMENT Left     BACK SURGERY      BCG for cancer      CATARACT EXTRACTION W/  INTRAOCULAR LENS IMPLANT Bilateral     CHOLECYSTECTOMY      COLONOSCOPY      COLONOSCOPY, THROUGH STOMA, WITH HEMORRHAGE CONTROL  11/22/2023    Procedure: COLONOSCOPY, THROUGH STOMA, WITH HEMORRHAGE CONTROL;  Surgeon: German Cottrell MD;  Location: [facility] ENDOSCOPY;  Service: Gastroenterology;;    COLONOSCOPY, WITH POLYPECTOMY USING SNARE N/A 11/22/2023    Procedure: COLON;  Surgeon: German Cottrell MD;  Location: [facility] ENDOSCOPY;  Service: Gastroenterology;  Laterality: N/A;    CORONARY ANGIOPLASTY  WITH STENT PLACEMENT      Dr. Strickland 18 years ago    DIAGNOSTIC LAPAROSCOPY N/A 07/28/2022    Procedure: LAPAROSCOPY, DIAGNOSTIC;  Surgeon: Edmund Echols Jr., MD;  Location: [facility] OR;  Service: General;  Laterality: N/A;    DIAGNOSTIC LAPAROSCOPY N/A 05/30/2023    Procedure: LAPAROSCOPY, DIAGNOSTIC;  Surgeon: Edmund Echols Jr., MD;  Location: [facility] OR;  Service: General;  Laterality: N/A;    ESOPHAGOGASTRODUODENOSCOPY N/A 08/25/2023    Procedure: EGD;  Surgeon: Elan Leigh MD;  Location: [facility] ENDOSCOPY;  Service: Gastroenterology;  Laterality: N/A;    ESOPHAGOGASTRODUODENOSCOPY N/A 11/22/2023    Procedure: DOUBLE;  Surgeon: German Cottrell MD;  Location: [facility] ENDOSCOPY;  Service: Gastroenterology;  Laterality: N/A;    FISTULOGRAM Left 2/5/2024    Procedure: Fistulogram;  Surgeon: Pradip Erwin DO;  Location: [facility] CATH LAB;  Service: Nephrology;  Laterality: Left;    HERNIA REPAIR      HYSTERECTOMY      LAPAROSCOPIC REVISION OF PROCEDURE INVOLVING PERITONEAL DIALYSIS CATHETER  05/30/2023    Procedure: REVISION OF PROCEDURE INVOLVING PERITONEAL DIALYSIS CATHETER, LAPAROSCOPIC;  Surgeon: Edmund Echols Jr., MD;  Location: [facility];  Service: General;;    NECK SURGERY      cervical    PERITONEAL CATHETER INSERTION      REMOVAL, CATHETER, DIALYSIS, PERITONEAL, LAPAROSCOPIC N/A 09/28/2023    Procedure: REMOVAL, CATHETER, DIALYSIS, PERITONEAL, LAPAROSCOPIC;  Surgeon: Edmund Echols Jr., MD;  Location: [facility];  Service: General;  Laterality: N/A;  LAP VENTRAL HERNIA REPAIR NOT PERFORMED DUE TO MESH IN PLACE, NOT NEEDED     right kidney removed Right     TONSILLECTOMY       Family History   Problem Relation Age of Onset    Cancer Mother     Heart attack Father      Social History     Tobacco Use    Smoking status: Every Day     Current packs/day: 1.00     Types: Cigarettes, Vaping with nicotine     Start date: 2022    Smokeless tobacco: Never   Substance Use Topics    Alcohol use: Not  Currently     Alcohol/week: 2.0 standard drinks of alcohol     Types: 1 Glasses of wine, 1 Cans of beer per week     Comment: occasionally    Drug use: Never     Review of Systems   Constitutional:  Negative for chills and fever.   Respiratory:  Negative for shortness of breath.    Cardiovascular:  Negative for chest pain.   Gastrointestinal:  Positive for abdominal pain, nausea and vomiting. Negative for constipation and diarrhea.   Neurological:  Negative for weakness and numbness.   Psychiatric/Behavioral:  Negative for confusion.    All other systems reviewed and are negative.      Physical Exam     Initial Vitals [03/20/24 1311]   BP Pulse Resp Temp SpO2   (!) 158/64 92 18 97.3 °F (36.3 °C) 95 %      MAP       --         Physical Exam    Nursing note and vitals reviewed.  Constitutional: She appears well-developed. No distress.   Lying in bed. Uncomfortable 2/2 pain   HENT:   Head: Normocephalic and atraumatic.   Eyes: Conjunctivae and EOM are normal.   Cardiovascular:  Normal rate, regular rhythm and normal heart sounds.           Pulmonary/Chest: Breath sounds normal. No respiratory distress.   Abdominal: She exhibits no distension. There is abdominal tenderness.   High pitched bowel sounds in JACQUELINE upper quadrants. Decreased bowel sounds in LLQ There is no rebound and no guarding.   Musculoskeletal:         General: Normal range of motion.     Neurological: She is alert and oriented to person, place, and time. GCS score is 15. GCS eye subscore is 4. GCS verbal subscore is 5. GCS motor subscore is 6.   Skin: Skin is warm and dry.   Psychiatric: She has a normal mood and affect. Thought content normal.         ED Course   Procedures  Labs Reviewed   COMPREHENSIVE METABOLIC PANEL - Abnormal; Notable for the following components:       Result Value    Carbon Dioxide 21 (*)     Glucose Level 142 (*)     Blood Urea Nitrogen 34.1 (*)     Creatinine 3.80 (*)     Calcium Level Total 10.4 (*)     All other components  within normal limits   MAGNESIUM - Abnormal; Notable for the following components:    Magnesium Level 2.70 (*)     All other components within normal limits   CBC WITH DIFFERENTIAL - Abnormal; Notable for the following components:    WBC 13.21 (*)     .3 (*)     MCH 31.6 (*)     MCHC 30.3 (*)     RDW 19.1 (*)     Lymph # 0.58 (*)     Neut # 12.16 (*)     IG# 0.06 (*)     All other components within normal limits   LIPASE - Normal   CBC W/ AUTO DIFFERENTIAL    Narrative:     The following orders were created for panel order CBC auto differential.  Procedure                               Abnormality         Status                     ---------                               -----------         ------                     CBC with Differential[[id number]]       Abnormal            Final result                 Please view results for these tests on the individual orders.   URINALYSIS, REFLEX TO URINE CULTURE          Imaging Results              XR Gastric tube check, non-radiologist performed (In process)                      XR Gastric tube check, non-radiologist performed (Final result)  Result time 03/20/24 16:27:18      Final result by Rosibel John MD (03/20/24 16:27:18)                   Impression:      Nasogastric tube looped over the esophagus.  Repositioning is needed.      Electronically signed by: Rosibel John  Date:    03/20/2024  Time:    16:27               Narrative:    EXAMINATION:  XR GASTRIC TUBE CHECK, NON-RADIOLOGIST PERFORMED    CLINICAL HISTORY:  post placement;    COMPARISON:  None.    FINDINGS:  The nasogastric tube is looped over the esophagus, with the tip pointing in the incorrect direction                                       CT Abdomen Pelvis  Without Contrast (Final result)  Result time 03/20/24 14:37:00      Final result by Deepti Zapata MD (03/20/24 14:37:00)                   Impression:      1. Findings most suggestive of small-bowel obstruction.  2. Small  volume free intraperitoneal fluid.  Small pleural effusions.      Electronically signed by: Deepti Zapata  Date:    03/20/2024  Time:    14:37               Narrative:    EXAMINATION:  CT ABDOMEN PELVIS WITHOUT CONTRAST    CLINICAL HISTORY:  Abdominal pain, acute, nonlocalized;    TECHNIQUE:  Helically acquired axial images, sagittal and coronal reformations were obtained from the lung bases to the pubic symphysis without the IV administration of contrast.    Automated tube current modulation, weight-based exposure dosing, and/or iterative reconstruction technique utilized to reach lowest reasonably achievable exposure rate.    DLP: 348 mGy*cm    COMPARISON:  CT abdomen pelvis 08/24/2023    FINDINGS:  HEART: There are coronary artery calcifications.    LUNG BASES: Small pleural effusions layering dependently.  Basilar atelectasis.    LIVER: Normal attenuation. No appreciable focal hepatic lesion.    BILIARY: Gallbladder is surgically absent.    PANCREAS: No inflammatory change.    SPLEEN: Normal in size    ADRENALS: No mass.    KIDNEYS/URETERS: Postop right nephrectomy.  No left hydronephrosis.  Unchanged appearance of the left kidney.    GI TRACT/MESENTERY: Evaluation of the bowel is limited without contrast. Small bowel loops are dilated measuring up to 3.6 cm in diameter and are fluid-filled with scattered air-fluid levels in small bowel feces sign compatible with delayed transit.  The terminal ileum is decompressed.  Transition point seen in the lower abdomen.  There is mesenteric edema.     Diverticulosis.    PERITONEUM: Small volume free fluid.  No free air seen.No free air.    LYMPH NODES: No enlarged lymph nodes by size criteria.    VASCULATURE: Aortoiliac atherosclerosis.    BLADDER: Normal appearance given degree of distention.    REPRODUCTIVE ORGANS: Postop hysterectomy.    ABDOMINAL WALL: Unremarkable.    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  Schmorl's node at L4.                                      "  Medications   ondansetron injection 4 mg (has no administration in time range)   diphenhydrAMINE injection 25 mg (has no administration in time range)   ondansetron injection 4 mg (4 mg Intravenous Given 3/20/24 1402)   benzocaine 20 % mouth spray ( Mouth/Throat Given 3/20/24 1520)   HYDROmorphone (PF) injection 1 mg (1 mg Intravenous Given 3/20/24 1509)   ondansetron injection 4 mg (4 mg Intravenous Given 3/20/24 1509)     Medical Decision Making  78-year-old female with past medical history of CAD, COPD, CKD on dialysis reports to the ER for evaluation of upper abdominal pain x1 day.  Patient reports sudden onset of pain last night.  She describes the pain as constant and cramping.  Patient reports associated nausea and vomiting.  Reports last episode of vomiting was prior to arrival.  Emesis bag in hand at time of my assessment.  Patient reports last bowel movement was yesterday morning and was "small and hard." She reports similar bowel movements last few days.  Patient denies any fever or chills.  She denies any known recent sick contacts.  She reports that last meal was yesterday evening.  Of note, patient is a hemodialysis patient.  She reports that she was twice weekly on Thursdays and Saturdays.  The patient reports a history of PD catheter placement surgery, hysterectomy, cholecystectomy, and hernia repair in the past. The patient is on Plavix.    For the patient's symptoms, the patient was given Dilaudid for pain and Zofran for nausea in the ED. NG-tube was placed in the ER.    Lab work today shows mild leukocytosis, increased kidney function, increase magnesium.  CT imaging showed small-bowel obstruction.  NG tube was placed in the ER and General surgery was called.  General surgery did come down and evaluated the patient.  They agree with plan to have NG decompression.  Recommend hospital admit for continued management.  Hospital Medicine was called, Dr. Vanegas accepted admit. I also spoke with  " Jennie, emergency medicine, who had face-to-face interaction with the patient. He agreed with treatment plan and plan for admission for continued management and observation of SBO.       Amount and/or Complexity of Data Reviewed  Labs:  Decision-making details documented in ED Course.  Radiology: ordered. Decision-making details documented in ED Course.  Discussion of management or test interpretation with external provider(s): General surgery did come down and evaluated the patient.  They agree with plan to have NG decompression.  Recommend hospital admit for continued management.  Hospital Medicine was called, Dr. Vanegas accepted admit. I also spoke with Dr. Schneider, emergency medicine, who had face-to-face interaction with the patient. He agreed with treatment plan and plan for admission for continued management and observation of SBO.     Risk  OTC drugs.  Prescription drug management.  Decision regarding hospitalization.      Additional MDM:   Differential Diagnosis:   Other: The following diagnoses were also considered and will be evaluated: SBO, cholecystitis and electrolyte abnormality.            ED Course as of 03/20/24 1819   Wed Mar 20, 2024   1415 CT abdomen pelvis with IV contrast canceled due to patient being on dialysis.  We will order CT abdomen and pelvis without contrast [LM]   1507 General surgery paged for small-bowel obstruction.  We will place NG tube [LM]   1507 CBC with mild leukocytosis 13.21 [LM]   1507 CMP: CO2 21, glucose 142, BUN 34.1, creatinine 3.8. [LM]   1508 Lipase: 21 [LM]   1508 Magnesium (!): 2.70 [LM]   1509 CT Impression:     1. Findings most suggestive of small-bowel obstruction.  2. Small volume free intraperitoneal fluid.  Small pleural effusions.   [LM]   1533 Dr. Echavarria, Gen surg, at bedside [LM]   1607 NG tube was successfully placed.  Pending x-ray.  Spoke with general surgery who agrees with NG decompression.  We will call hospital medicine for admission. Gen Surg will  follow [LM]   1638 XR: Impression:     Nasogastric tube looped over the esophagus.  Repositioning is needed.   [LM]   1652 Magnesium (!): 2.70 [MM]   1652 Lipase: 21 [MM]   1653 CT Abdomen Pelvis  Without Contrast  SBO [MM]   1818 XR shows that NG tube requires repositioning. Nursing spoke with general surgery as they had attempted multiple times unsuccessfully. Gen Surg recommends trying again and resident will speak with attending regaring patient care. Hospitalist is aware and ordered additional meds to make patient comfortable for repositioning   [LM]      ED Course User Index  [LM] Deysi Watkins PA  [MM] Reza Schneider MD                           Clinical Impression:  Final diagnoses:  [K56.609] Small bowel obstruction          ED Disposition Condition    Admit                 Deysi Watkins PA  03/20/24 0509

## 2024-03-20 NOTE — FIRST PROVIDER EVALUATION
"Medical screening examination initiated.  I have conducted a focused provider triage encounter, findings are as follows:    Brief history of present illness:  78 year old female presents to ER with generalized abdominal pain onset last night. +N/V. Denies diarrhea     Vitals:    03/20/24 1311   BP: (!) 158/64   Pulse: 92   Resp: 18   Temp: 97.3 °F (36.3 °C)   TempSrc: Temporal   SpO2: 95%   Weight: 45.4 kg (100 lb)   Height: 5' 1" (1.549 m)       Pertinent physical exam:  Awake and alert, NAD    Brief workup plan:  Labs    Preliminary workup initiated; this workup will be continued and followed by the physician or advanced practice provider that is assigned to the patient when roomed.  "

## 2024-03-21 NOTE — ED NOTES
Two additional attempts for re-insertion of NG tube, 2 RNs attempted, both attempts unsuccessful. Pt refusing further attempts. Updated sx, as long as pt does not vomit, okay to not have NG at this time. Pt on aspiration precautions; if pt vomits, retry insertion of NG tube. Sx to check w/ GI/ IR to perform NG insertion in morning.

## 2024-03-21 NOTE — PROGRESS NOTES
[facility] Hospital Medicine Progress Note        Chief Complaint: Inpatient Follow-up for     HPI:   78 yr old female whose history includes CAD, COPD, ESRD on HD, and HTN. Presented to ED with c/o a one day history of abdominal pain, nausea and vomiting. Last bowel movement was 2 days ago and described as smaller and harder. Took stool softeners thinking she was constipated with no relief of symptoms. She's had multiple previous abdominal surgeries. Denies passing any flatus. Does outpatient hemodialysis on Tuesday and Saturday. Last HD was 3/19.      VS on arrival: T 97.3, P 92, R 18, B/P 158/64, Sats 95% on room air. Initial labs: wBC 13.2, Hgb 14, bicarb 21, CHRISTOPHER 34, Hct 3.8 and otherwise unremarkable. CT abdomen/pelvis without contrast showed findings of SBO, small volume free intraperitoneal fluid and small pleural effusions. Multiple attempts of NG tube placement without success.     Interval Hx:   Patient seen and examined by bedside.  No acute overnight events.  Patient is slightly dry heaving however no emesis noted.  Says nausea had improved.  Currently has soft abdomen.  Last received pain med earlier this morning.  Vitals reviewed and are stable    Case was discussed with patient's nurse and  on the floor.    Objective/physical exam:  General: In no acute distress, afebrile  Chest: Clear to auscultation bilaterally  Heart: RRR, +S1, S2, no appreciable murmur  Abdomen: Soft, nontender, BS +  MSK: Warm, no lower extremity edema, no clubbing or cyanosis  Neurologic: Alert and oriented x4, Cranial nerve II-XII intact, Strength 5/5 in all 4 extremities    VITAL SIGNS: 24 HRS MIN & MAX LAST   Temp  Min: 97.3 °F (36.3 °C)  Max: 98.4 °F (36.9 °C) 98.4 °F (36.9 °C)   BP  Min: 127/57  Max: 167/63 (!) 157/62   Pulse  Min: 80  Max: 96  90   Resp  Min: 12  Max: 31 20   SpO2  Min: 85 %  Max: 94 % (!) 91 %     I have reviewed the following labs:  Recent Labs   Lab  03/20/24  1333 03/21/24  0434   WBC 13.21* 12.96*   RBC 4.43 3.37*   HGB 14.0 11.0*   HCT 46.2 35.8*   .3* 106.2*   MCH 31.6* 32.6*   MCHC 30.3* 30.7*   RDW 19.1* 18.9*    242   MPV 9.8 10.0     Recent Labs   Lab 03/20/24  1333 03/21/24  0434    139   K 4.6 4.6   CO2 21* 24   BUN 34.1* 38.1*   CREATININE 3.80* 4.11*   CALCIUM 10.4* 9.4   MG 2.70*  --    ALBUMIN 4.1 3.2*   ALKPHOS 92 73   ALT 18 14   AST 28 19   BILITOT 0.8 0.7     Microbiology Results (last 7 days)       ** No results found for the last 168 hours. **             See below for Radiology    Scheduled Med:   allopurinoL  100 mg Oral Nightly    amitriptyline  25 mg Oral QHS    [START ON 3/22/2024] amLODIPine  2.5 mg Oral Daily    atorvastatin  20 mg Oral QHS    [START ON 3/22/2024] cetirizine  10 mg Oral Daily    clopidogreL  75 mg Oral Daily    fluticasone furoate-vilanteroL  1 puff Inhalation Daily    [START ON 3/22/2024] furosemide  40 mg Oral Daily    gabapentin  300 mg Oral Daily    hydrALAZINE  50 mg Oral Q8H    pantoprazole  40 mg Oral Nightly    traZODone  50 mg Oral QHS      Continuous Infusions:   sodium chloride 0.9% 75 mL/hr at 03/21/24 1226      PRN Meds:  acetaminophen, acetaminophen, aluminum-magnesium hydroxide-simethicone, dextrose 10%, dextrose 10%, diphenhydrAMINE, glucagon (human recombinant), glucose, glucose, hydrALAZINE, ketorolac, naloxone, ondansetron, prochlorperazine, sodium chloride 0.9%     Assessment/Plan:  Small-bowel obstruction  ESRD on HD   Hypertension   Leukocytosis, likely reactive  Macrocytic anemia  History of COPD, RCC, bladder cancer, and GERD, gout, CVA with residual left-sided weakness    Multiple attempts for NG tube placement however were unsuccessful   Patient currently has no nausea or emesis, some dry heaving   If has worsening nausea or emesis, we will likely need to consult GI or IR for NG placement   General surgery following for small-bowel obstruction, no acute surgical  intervention needed   Nephrology consulted for HD, appreciate recommendations   Continues to remain NPO   Continue fluids per Nephrology   Possibly can have advance tolerated if okay with General surgery since patient has less nausea and pain  Repeat KUB in a.m.   Resume home meds as appropriate   Further recs based on clinical course  Labs in a.m.    VTE prophylaxis:  Lovenox    Patient condition:  Fair    Anticipated discharge and Disposition:   Possibly home      All diagnosis and differential diagnosis have been reviewed; assessment and plan has been documented; I have personally reviewed the labs and test results that are presently available; I have reviewed the patients medication list; I have reviewed the consulting providers response and recommendations. I have reviewed or attempted to review medical records based upon their availability    All of the patient's questions have been  addressed and answered. Patient's is agreeable to the above stated plan. I will continue to monitor closely and make adjustments to medical management as needed.  _____________________________________________________________________    Nutrition Status:    Radiology:  I have personally reviewed the following imaging and agree with the radiologist.     XR Gastric tube check, non-radiologist performed  Narrative: EXAMINATION:  XR GASTRIC TUBE CHECK, NON-RADIOLOGIST PERFORMED    CLINICAL HISTORY:  post placement;    COMPARISON:  None.    FINDINGS:  The nasogastric tube is looped over the esophagus, with the tip pointing in the incorrect direction  Impression: Nasogastric tube looped over the esophagus.  Repositioning is needed.    Electronically signed by: Rosibel John  Date:    03/20/2024  Time:    16:27  CT Abdomen Pelvis  Without Contrast  Narrative: EXAMINATION:  CT ABDOMEN PELVIS WITHOUT CONTRAST    CLINICAL HISTORY:  Abdominal pain, acute, nonlocalized;    TECHNIQUE:  Helically acquired axial images, sagittal and coronal  reformations were obtained from the lung bases to the pubic symphysis without the IV administration of contrast.    Automated tube current modulation, weight-based exposure dosing, and/or iterative reconstruction technique utilized to reach lowest reasonably achievable exposure rate.    DLP: 348 mGy*cm    COMPARISON:  CT abdomen pelvis 08/24/2023    FINDINGS:  HEART: There are coronary artery calcifications.    LUNG BASES: Small pleural effusions layering dependently.  Basilar atelectasis.    LIVER: Normal attenuation. No appreciable focal hepatic lesion.    BILIARY: Gallbladder is surgically absent.    PANCREAS: No inflammatory change.    SPLEEN: Normal in size    ADRENALS: No mass.    KIDNEYS/URETERS: Postop right nephrectomy.  No left hydronephrosis.  Unchanged appearance of the left kidney.    GI TRACT/MESENTERY: Evaluation of the bowel is limited without contrast. Small bowel loops are dilated measuring up to 3.6 cm in diameter and are fluid-filled with scattered air-fluid levels in small bowel feces sign compatible with delayed transit.  The terminal ileum is decompressed.  Transition point seen in the lower abdomen.  There is mesenteric edema.     Diverticulosis.    PERITONEUM: Small volume free fluid.  No free air seen.No free air.    LYMPH NODES: No enlarged lymph nodes by size criteria.    VASCULATURE: Aortoiliac atherosclerosis.    BLADDER: Normal appearance given degree of distention.    REPRODUCTIVE ORGANS: Postop hysterectomy.    ABDOMINAL WALL: Unremarkable.    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  Schmorl's node at L4.  Impression: 1. Findings most suggestive of small-bowel obstruction.  2. Small volume free intraperitoneal fluid.  Small pleural effusions.    Electronically signed by: Deepti Zapata  Date:    03/20/2024  Time:    14:37      Linnette Cantrell DO  Department of Hospital Medicine  [facility]  03/21/2024

## 2024-03-21 NOTE — CONSULTS
Nephrology  Consults  Chief Complaint   Patient presents with    Abdominal Pain     C/o abdominal pain since last night. Endorses N/V and constipation. hx dialysis, states taking prune juice and lactulose with no relief.     HPI:  78 y/oo F, consulted for ESRD (known to us for HD q Tues and Sat).  She presented to ED on 3/20/24 with abdominal pain, nausea and vomiting. Last bowel movement was 2 days prior. CT abdomen/pelvis without contrast showed findings of SBO.  She's had multiple previous abdominal surgeries. NGT was attempted 9 times but unsuccessful.  She is asking for ice to cool her mouth. Abd pain is better after zofran.     Review of Systems   All other systems negative except for HPI      Past Medical History:   Diagnosis Date    Acid reflux     Anesthesia complication     trouble awaking    CAD (coronary artery disease)     CORONARY STENT    Chronic kidney disease, unspecified     COPD (chronic obstructive pulmonary disease)     COVID-19     week of Mothers Day 2023    Dialysis patient     CSI tues AND sat  chair time 0630    Epigastric pain     GERD (gastroesophageal reflux disease)     GI bleed     Gout     High cholesterol     Hx of bladder cancer     Hypertension     Kidney cancer, primary, with metastasis from kidney to other site, right     Melena     Muscle weakness of extremity     bilateral lower    Pneumonia     Post-COVID chronic cough     Sciatic nerve pain     SOB (shortness of breath) on exertion     Stroke     1997, small memory loss with  left sided weakness        Past Surgical History:   Procedure Laterality Date    AV FISTULA PLACEMENT Left     BACK SURGERY      BCG for cancer      CATARACT EXTRACTION W/  INTRAOCULAR LENS IMPLANT Bilateral     CHOLECYSTECTOMY      COLONOSCOPY      COLONOSCOPY, THROUGH STOMA, WITH HEMORRHAGE CONTROL  11/22/2023    Procedure: COLONOSCOPY, THROUGH STOMA, WITH HEMORRHAGE CONTROL;  Surgeon: German Cottrell MD;  Location: [facility] ENDOSCOPY;   Service: Gastroenterology;;    COLONOSCOPY, WITH POLYPECTOMY USING SNARE N/A 11/22/2023    Procedure: COLON;  Surgeon: German Cottrell MD;  Location: [facility] ENDOSCOPY;  Service: Gastroenterology;  Laterality: N/A;    CORONARY ANGIOPLASTY WITH STENT PLACEMENT      Dr. Strickland 18 years ago    DIAGNOSTIC LAPAROSCOPY N/A 07/28/2022    Procedure: LAPAROSCOPY, DIAGNOSTIC;  Surgeon: Edmund Echols Jr., MD;  Location: [facility] OR;  Service: General;  Laterality: N/A;    DIAGNOSTIC LAPAROSCOPY N/A 05/30/2023    Procedure: LAPAROSCOPY, DIAGNOSTIC;  Surgeon: Edmund Echols Jr., MD;  Location: [facility] OR;  Service: General;  Laterality: N/A;    ESOPHAGOGASTRODUODENOSCOPY N/A 08/25/2023    Procedure: EGD;  Surgeon: Elan Leigh MD;  Location: [facility] ENDOSCOPY;  Service: Gastroenterology;  Laterality: N/A;    ESOPHAGOGASTRODUODENOSCOPY N/A 11/22/2023    Procedure: DOUBLE;  Surgeon: German Cottrell MD;  Location: [facility] ENDOSCOPY;  Service: Gastroenterology;  Laterality: N/A;    FISTULOGRAM Left 2/5/2024    Procedure: Fistulogram;  Surgeon: Pradip Erwin DO;  Location: [facility] CATH LAB;  Service: Nephrology;  Laterality: Left;    HERNIA REPAIR      HYSTERECTOMY      LAPAROSCOPIC REVISION OF PROCEDURE INVOLVING PERITONEAL DIALYSIS CATHETER  05/30/2023    Procedure: REVISION OF PROCEDURE INVOLVING PERITONEAL DIALYSIS CATHETER, LAPAROSCOPIC;  Surgeon: Edmund Echols Jr., MD;  Location: [facility];  Service: General;;    NECK SURGERY      cervical    PERITONEAL CATHETER INSERTION      REMOVAL, CATHETER, DIALYSIS, PERITONEAL, LAPAROSCOPIC N/A 09/28/2023    Procedure: REMOVAL, CATHETER, DIALYSIS, PERITONEAL, LAPAROSCOPIC;  Surgeon: Edmund Echols Jr., MD;  Location: [facility] OR;  Service: General;  Laterality: N/A;  LAP VENTRAL HERNIA REPAIR NOT PERFORMED DUE TO MESH IN PLACE, NOT NEEDED     right kidney removed Right     TONSILLECTOMY          Family History   Problem Relation Age of Onset    Cancer Mother      Heart attack Father         Social History     Socioeconomic History    Marital status:    Tobacco Use    Smoking status: Every Day     Current packs/day: 1.00     Types: Cigarettes, Vaping with nicotine     Start date: 2022    Smokeless tobacco: Never   Substance and Sexual Activity    Alcohol use: Not Currently     Alcohol/week: 2.0 standard drinks of alcohol     Types: 1 Glasses of wine, 1 Cans of beer per week     Comment: occasionally    Drug use: Never    Sexual activity: Not Currently     Social Determinants of Health     Financial Resource Strain: Medium Risk (3/20/2024)    Overall Financial Resource Strain (CARDIA)     Difficulty of Paying Living Expenses: Somewhat hard   Food Insecurity: No Food Insecurity (3/20/2024)    Hunger Vital Sign     Worried About Running Out of Food in the Last Year: Never true     Ran Out of Food in the Last Year: Never true   Transportation Needs: No Transportation Needs (3/20/2024)    PRAPARE - Transportation     Lack of Transportation (Medical): No     Lack of Transportation (Non-Medical): No   Stress: Stress Concern Present (3/20/2024)    [employer] [place] [employer] - Occupational Stress Questionnaire     Feeling of Stress : Very much   Housing Stability: Unknown (3/20/2024)    Housing Stability Vital Sign     Unable to Pay for Housing in the Last Year: No     Unstable Housing in the Last Year: No        Review of patient's allergies indicates:   Allergen Reactions    Penicillins Itching and Hallucinations    Ciprofloxacin Itching    Codeine Itching     Current Outpatient Medications   Medication Instructions    allopurinoL (ZYLOPRIM) 100 mg, Oral, Nightly    amitriptyline (ELAVIL) 25 mg, Oral, Nightly    amLODIPine (NORVASC) 2.5 mg, Oral, Daily    atorvastatin (LIPITOR) 20 mg, Oral, Daily    clopidogreL (PLAVIX) 75 mg, Oral, Daily    docusate sodium (COLACE) 150 mg, Oral, Nightly    fluticasone-salmeterol diskus inhaler 250-50 mcg 1 puff, Inhalation, 2  times daily, Controller    folic acid-B cudk-D-nbtzf-zinc (DIALYVITE 3000) 3-70-15 mg-mcg-mg Tab Oral, Daily    furosemide (LASIX) 40 mg, Oral, Daily    gabapentin (NEURONTIN) 300 mg, Oral, Daily    hydrALAZINE (APRESOLINE) 50 mg, Oral, Every 8 hours    levocetirizine (XYZAL) 5 mg, Oral, Nightly    magnesium oxide (MAG-OX) 400 mg, Oral, 2 times daily    metoprolol succinate (TOPROL-XL) 50 mg, Oral, Daily    pantoprazole (PROTONIX) 40 mg, Oral, Nightly    psyllium 0.52 g, Oral, Daily    sodium bicarbonate 1,300 mg, Oral, 2 times daily    traZODone (DESYREL) 50 mg, Oral, Nightly     Objective   VITAL SIGNS: 24 HR MIN & MAX LAST    Temp  Min: 97.3 °F (36.3 °C)  Max: 98.3 °F (36.8 °C)  97.3 °F (36.3 °C)        BP  Min: 127/57  Max: 175/59  (!) 127/57     Pulse  Min: 80  Max: 101  80     Resp  Min: 12  Max: 31  18    SpO2  Min: 85 %  Max: 97 %  (!) 92 %      Wt Readings from Last 3 Encounters:   03/20/24 51.3 kg (113 lb 1.5 oz)   02/05/24 52.6 kg (116 lb)   11/22/23 55.8 kg (123 lb)       Intake/Output Summary (Last 24 hours) at 3/21/2024 1123  Last data filed at 3/21/2024 0614  Gross per 24 hour   Intake 999 ml   Output --   Net 999 ml     General:  Alert and oriented  Psychiatric:  Cooperative, Appropriate mood & affect.    Eye:  Within normal limits, Normal conjunctiva.    HENT:  Normocephalic, Oral mucosa is moist.    Respiratory: Bilaterally CTA, un-labored.    Cardiovascular:  Normal rate, Regular rhythm, No murmur, No edema.    Gastrointestinal:  Mild tender, ND.    Musculoskeletal:  Normal strength, No deformity.  L arm AVF  Integumentary:  Warm, No rash.    Recent Labs     03/20/24  1333 03/21/24  0434    139   K 4.6 4.6   CHLORIDE 101 103   CO2 21* 24   BUN 34.1* 38.1*   CREATININE 3.80* 4.11*   GLUCOSE 142* 94   CALCIUM 10.4* 9.4   MG 2.70*  --    ALBUMIN 4.1 3.2*      Recent Labs     03/20/24  1333 03/21/24  0434   WBC 13.21* 12.96*   HGB 14.0 11.0*    242     XR Gastric tube check,  non-radiologist performed   Final Result      Nasogastric tube looped over the esophagus.  Repositioning is needed.         Electronically signed by: Rosibel John   Date:    03/20/2024   Time:    16:27      CT Abdomen Pelvis  Without Contrast   Final Result      1. Findings most suggestive of small-bowel obstruction.   2. Small volume free intraperitoneal fluid.  Small pleural effusions.         Electronically signed by: Deepti Zapata   Date:    03/20/2024   Time:    14:37           ASSESSMENT PLAN    SBO    ESRD (HD Q T/S)  SBO  Anemia   Metabolic bone disease  HTN  H/o GI bleed  CAD with remote stenting.On Plavix  Carotid artery disease (right total occlusion, left 40-60% )  Prior CVA (12/2019) with minimal left sided weakness  Right Kidney cancer with Mets, status post right nephrectomy  Bladder cancer    Plan HD Saturday, has residual renal function, will not need any UF   Check Folate and B12 for macrocytosis  Check Phos and PTH      Pt has SBO, is NPO, and no NGT  Start NS @ 76 for SBO  May need NGT endoscopically placed.   OK to have ice chips until seen by Gen Sx attedcarlos Avalos M.D.  Nephrology

## 2024-03-21 NOTE — PLAN OF CARE
03/21/24 1329   Discharge Assessment   Assessment Type Discharge Planning Assessment   Confirmed/corrected address, phone number and insurance Yes   Confirmed Demographics Correct on Facesheet   Source of Information patient   When was your last doctors appointment?   (Pt states last visit with Dr Juan was about 3 weeks ago)   Reason For Admission small bowel obstruction   People in Home alone   Do you expect to return to your current living situation? Yes   Do you have help at home or someone to help you manage your care at home? No   Current cognitive status: Alert/Oriented;No Deficits   Walking or Climbing Stairs Difficulty yes   Walking or Climbing Stairs ambulation difficulty, requires equipment   Mobility Management Pt states she has a walker for use prn   Dressing/Bathing Difficulty no   Home Layout Able to live on 1st floor   Equipment Currently Used at Home walker, rolling   Do you currently have service(s) that help you manage your care at home? No   Do you take prescription medications? Yes   Do you have prescription coverage? Yes   Coverage [employer] Managed Medicare   Do you have any problems affording any of your prescribed medications? No   Is the patient taking medications as prescribed? yes   Who is going to help you get home at discharge? Pt states her son or daughter in law will drive her home   How do you get to doctors appointments? car, drives self   Are you on dialysis? Yes   Dialysis Name and Scheduled days [facility] ([phone]) , [place], [place]   Do you take coumadin? No   Discharge Plan A Home with family   Discharge Plan B Home with family   Discharge Plan discussed with: Patient   Transition of Care Barriers None     Pt states she lives alone in a single level home with no steps to enter the home. She states she is independent with ADL's and is able to drive prior to admission. She states she goes to dialysis only 2 days a week [place], [place] at [facility]. She is not active with a home health agency.

## 2024-03-21 NOTE — ED NOTES
Per sx remove NG tube d/t remove risk of aspiration at this time; attempt insertion again after pt has break; Dr. Davis will talk to sx team and call back with plan

## 2024-03-21 NOTE — PROGRESS NOTES
"   Acute Care Surgery   Progress Note  Admit Date: 3/20/2024  HD#1  POD#* No surgery found *    Subjective:   Interval history:  NAEO  AF HDS  Denies BM or flatus since 3/19  Nausea controlled with meds  Reports abdominal pain improved from yesterday  Denies emesis  NG tube attempted unsuccessfully 9 times     Scheduled Meds:   fluticasone furoate-vilanteroL  1 puff Inhalation Daily     Continuous Infusions:  PRN Meds:acetaminophen, acetaminophen, aluminum-magnesium hydroxide-simethicone, dextrose 10%, dextrose 10%, diphenhydrAMINE, glucagon (human recombinant), glucose, glucose, hydrALAZINE, ketorolac, naloxone, ondansetron, prochlorperazine, sodium chloride 0.9%     Objective:     VITAL SIGNS: 24 HR MIN & MAX LAST   Temp  Min: 97.3 °F (36.3 °C)  Max: 98.3 °F (36.8 °C)  97.3 °F (36.3 °C)   BP  Min: 127/57  Max: 175/59  (!) 127/57    Pulse  Min: 80  Max: 101  80    Resp  Min: 12  Max: 31  18    SpO2  Min: 85 %  Max: 97 %  (!) 92 %      HT: 5' 1" (154.9 cm)  WT: 51.3 kg (113 lb 1.5 oz)  BMI: 21.4     Intake/output:  Intake/Output - Last 3 Shifts         03/19 0700  03/20 0659 03/20 0700  03/21 0659 03/21 0700  03/22 0659    P.O.  0     IV Piggyback  999     Total Intake(mL/kg)  999 (19.5)     Net  +999            Urine Occurrence  1 x             Intake/Output Summary (Last 24 hours) at 3/21/2024 1136  Last data filed at 3/21/2024 0614  Gross per 24 hour   Intake 999 ml   Output --   Net 999 ml        Lines/drains/airway:       Peripheral IV - Single Lumen 03/21/24 0000 22 G Distal;Posterior;Right Forearm (Active)   Site Assessment Clean;Dry;Intact;No redness;No swelling 03/21/24 1128   Line Status Capped;Saline locked 03/21/24 1128   Dressing Status Clean;Dry;Intact 03/21/24 1128   Dressing Intervention First dressing 03/21/24 0000   Number of days: 0            Hemodialysis AV Fistula 07/01/21 0800 Left upper arm (Active)   Number of days: 994       Physical examination:  Gen: NAD, AAOx3, answering questions " "appropriately  HEENT:  CV: RR  Resp: NWOB  Abd: compressible, distended, TTP in LLQ  Ext: moving all extremities spontaneously and purposefully  Neuro: CN II-XII grossly intact    Labs:  Renal:  Recent Labs     03/20/24 1333 03/21/24  0434   BUN 34.1* 38.1*   CREATININE 3.80* 4.11*     No results for input(s): "LACTIC" in the last 72 hours.  FENGI:  Recent Labs     03/20/24 1333 03/21/24  0434    139   K 4.6 4.6   CO2 21* 24   CALCIUM 10.4* 9.4   MG 2.70*  --    ALBUMIN 4.1 3.2*   BILITOT 0.8 0.7   AST 28 19   ALKPHOS 92 73   ALT 18 14     Heme:  Recent Labs     03/20/24 1333 03/21/24  0434   HGB 14.0 11.0*   HCT 46.2 35.8*    242     ID:  Recent Labs     03/20/24 1333 03/21/24  0434   WBC 13.21* 12.96*     CBG:  Recent Labs     03/20/24 1333 03/21/24  0434   GLUCOSE 142* 94      Cardiovascular:  No results for input(s): "TROPONINI", "CKTOTAL", "CKMB", "BNP" in the last 168 hours.  I have reviewed all pertinent lab results within the past 24 hours.    Imaging:  XR Gastric tube check, non-radiologist performed   Final Result      Nasogastric tube looped over the esophagus.  Repositioning is needed.         Electronically signed by: Rosibel John   Date:    03/20/2024   Time:    16:27      CT Abdomen Pelvis  Without Contrast   Final Result      1. Findings most suggestive of small-bowel obstruction.   2. Small volume free intraperitoneal fluid.  Small pleural effusions.         Electronically signed by: Deepti Zapata   Date:    03/20/2024   Time:    14:37         I have reviewed all pertinent imaging results/findings within the past 24 hours.    Micro/Path/Other:  Microbiology Results (last 7 days)       ** No results found for the last 168 hours. **           Pathology Results  (Last 7 days)      None             Assessment & Plan:   78 y F with history CAD status post coronary stent, renal cancer status post nephrectomy, ESRD on dialysis via left upper extremity access, hypertension, COPD, " previous  and cholecystectomy     - If patient becomes nauseous or has episode of emesis, may require GI assistance with NG tube placement, however at this time patient reports pain improving and nausea well controlled with medication, no emesis  - maintain NPO   - continue serial abdominal exams  - rest of care per primary team    Maylin Apodaca MD  [employer] General Surgery, PGY-1    The above findings, diagnostics, and treatment plan were discussed with the physician who will follow with further assessments and recommendations.

## 2024-03-21 NOTE — NURSING
Nurses Note -- 4 Eyes      3/21/2024   1:03 AM      Skin assessed during: Admit      [x] No Altered Skin Integrity Present    [x]Prevention Measures Documented      [] Yes- Altered Skin Integrity Present or Discovered   [] LDA Added if Not in Epic (Describe Wound)   [] New Altered Skin Integrity was Present on Admit and Documented in LDA   [] Wound Image Taken    Wound Care Consulted? No    Attending Nurse:  Saniya Hill RN/Staff Member:  Claudia

## 2024-03-22 NOTE — ANESTHESIA PROCEDURE NOTES
Intubation    Date/Time: 3/22/2024 11:42 AM    Performed by: Rhona Jackson CRNA  Authorized by: Cale Lal MD    Intubation:     Induction:  Rapid sequence induction    Intubated:  Postinduction    Mask Ventilation:  Not attempted    Attempts:  1    Attempted By:  CRNA    Method of Intubation:  Video laryngoscopy    Blade:  Salvador 3    Laryngeal View Grade: Grade I - full view of cords      Difficult Airway Encountered?: No      Complications:  None    Airway Device:  Oral endotracheal tube    Airway Device Size:  7.0    Style/Cuff Inflation:  Cuffed (inflated to minimal occlusive pressure)    Inflation Amount (mL):  6    Tube secured:  22    Secured at:  The lips    Placement Verified By:  Capnometry    Complicating Factors:  None    Findings Post-Intubation:  BS equal bilateral and atraumatic/condition of teeth unchanged

## 2024-03-22 NOTE — PROGRESS NOTES
Renal    78 y/oo F with ESRD, HD q Tues and Sat. Admitted on 3/20/24 for a SBO.  She has multiple previous abdominal surgeries. NGT was attempted 9 times but unsuccessful.      Interval History:  Continued to have abd pain and then vomoited black foul smelling vomitus. No win OR for Ex-lap    ROS:  all else Neg     allopurinoL  100 mg Oral Nightly    amitriptyline  25 mg Oral QHS    amLODIPine  2.5 mg Oral Daily    atorvastatin  20 mg Oral QHS    cetirizine  10 mg Oral Daily    clindamycin in D5W  900 mg Intravenous Once    enoxparin  30 mg Subcutaneous Q24H (prophylaxis, 1700)    fluticasone furoate-vilanteroL  1 puff Inhalation Daily    furosemide  40 mg Oral Daily    gabapentin  300 mg Oral Daily    hydrALAZINE  50 mg Oral Q8H    pantoprazole  40 mg Oral Nightly    traZODone  50 mg Oral QHS       VITAL SIGNS: 24 HR MIN & MAX LAST    Temp  Min: 98 °F (36.7 °C)  Max: 99 °F (37.2 °C)  98.4 °F (36.9 °C)        BP  Min: 131/53  Max: 157/62  (!) 132/50     Pulse  Min: 90  Max: 103  94     Resp  Min: 16  Max: 18  16    SpO2  Min: 82 %  Max: 92 %  (!) 92 %      Wt Readings from Last 3 Encounters:   03/20/24 51.3 kg (113 lb 1.5 oz)   02/05/24 52.6 kg (116 lb)   11/22/23 55.8 kg (123 lb)       Intake/Output Summary (Last 24 hours) at 3/22/2024 1226  Last data filed at 3/22/2024 1205  Gross per 24 hour   Intake 225 ml   Output --   Net 225 ml         Recent Labs     03/20/24  1333 03/21/24  0434 03/22/24  0425    139 142   K 4.6 4.6 5.5*   CHLORIDE 101 103 104   CO2 21* 24 20*   BUN 34.1* 38.1* 59.9*   CREATININE 3.80* 4.11* 5.07*   GLUCOSE 142* 94 60*   CALCIUM 10.4* 9.4 8.6   MG 2.70*  --  2.30   PHOS  --   --  6.1*   ALBUMIN 4.1 3.2* 3.0*      Recent Labs     03/20/24  1333 03/21/24  0434 03/22/24  0425   WBC 13.21* 12.96* 5.47   HGB 14.0 11.0* 10.8*   HCT 46.2 35.8* 35.7*    242 245       ASSESSMENT / PLAN    SBO     ESRD (HD Q T/S)  SBO  Anemia   Metabolic bone disease, hyperphosphatemia  HTN  H/o GI  bleed  CAD with remote stenting.On Plavix  Carotid artery disease (right total occlusion, left 40-60% )  Prior CVA (12/2019) with minimal left sided weakness  Right Kidney cancer with Mets, status post right nephrectomy  Bladder cancer       Plan HD Saturday, has residual renal function, will not need any UF         Peng Avalos M.D.  Nephrology

## 2024-03-22 NOTE — PROGRESS NOTES
Acute Care Surgery   Progress Note  Admit Date: 3/20/2024  HD#2  POD#* No surgery date entered *    Subjective:   Interval history:  Patient reports emesis x2 overnight with worsening abdominal pain  Denies flatus or BM    Home Meds:  Current Outpatient Medications   Medication Instructions    allopurinoL (ZYLOPRIM) 100 mg, Oral, Nightly    amitriptyline (ELAVIL) 25 mg, Oral, Nightly    amLODIPine (NORVASC) 2.5 mg, Oral, Daily    atorvastatin (LIPITOR) 20 mg, Oral, Daily    clopidogreL (PLAVIX) 75 mg, Oral, Daily    docusate sodium (COLACE) 150 mg, Oral, Nightly    fluticasone-salmeterol diskus inhaler 250-50 mcg 1 puff, Inhalation, 2 times daily, Controller    folic acid-B biyc-Z-hfcjj-zinc (DIALYVITE 3000) 3-70-15 mg-mcg-mg Tab Oral, Daily    furosemide (LASIX) 40 mg, Oral, Daily    gabapentin (NEURONTIN) 300 mg, Oral, Daily    hydrALAZINE (APRESOLINE) 50 mg, Oral, Every 8 hours    levocetirizine (XYZAL) 5 mg, Oral, Nightly    magnesium oxide (MAG-OX) 400 mg, Oral, 2 times daily    metoprolol succinate (TOPROL-XL) 50 mg, Oral, Daily    pantoprazole (PROTONIX) 40 mg, Oral, Nightly    psyllium 0.52 g, Oral, Daily    sodium bicarbonate 1,300 mg, Oral, 2 times daily    traZODone (DESYREL) 50 mg, Oral, Nightly      Scheduled Meds:   allopurinoL  100 mg Oral Nightly    amitriptyline  25 mg Oral QHS    amLODIPine  2.5 mg Oral Daily    atorvastatin  20 mg Oral QHS    cetirizine  10 mg Oral Daily    enoxparin  30 mg Subcutaneous Q24H (prophylaxis, 1700)    fluticasone furoate-vilanteroL  1 puff Inhalation Daily    furosemide  40 mg Oral Daily    gabapentin  300 mg Oral Daily    hydrALAZINE  50 mg Oral Q8H    pantoprazole  40 mg Oral Nightly    traZODone  50 mg Oral QHS     Continuous Infusions:   sodium chloride 0.9% 75 mL/hr at 03/21/24 1226     PRN Meds:acetaminophen, acetaminophen, aluminum-magnesium hydroxide-simethicone, dextrose 10%, dextrose 10%, diphenhydrAMINE, glucagon (human recombinant), glucose, glucose,  "hydrALAZINE, ketorolac, naloxone, ondansetron, prochlorperazine, sodium chloride 0.9%     Objective:     VITAL SIGNS: 24 HR MIN & MAX LAST   Temp  Min: 98 °F (36.7 °C)  Max: 99 °F (37.2 °C)  98.4 °F (36.9 °C)   BP  Min: 131/53  Max: 157/62  (!) 136/52    Pulse  Min: 80  Max: 103  103    Resp  Min: 16  Max: 20  16    SpO2  Min: 82 %  Max: 92 %  (!) 82 %      HT: 5' 1" (154.9 cm)  WT: 51.3 kg (113 lb 1.5 oz)  BMI: 21.4     Intake/output:  Intake/Output - Last 3 Shifts         03/20 0700  03/21 0659 03/21 0700 03/22 0659 03/22 0700 03/23 0659    P.O. 0 75     IV Piggyback 999      Total Intake(mL/kg) 999 (19.5) 75 (1.5)     Net +999 +75            Urine Occurrence 1 x 4 x     Stool Occurrence  0 x             Intake/Output Summary (Last 24 hours) at 3/22/2024 0753  Last data filed at 3/22/2024 0546  Gross per 24 hour   Intake 75 ml   Output --   Net 75 ml           Lines/drains/airway:       Peripheral IV - Single Lumen 03/21/24 0000 22 G Distal;Posterior;Right Forearm (Active)   Site Assessment Clean;Dry;Intact 03/22/24 0400   Line Status Infusing 03/22/24 0400   Dressing Status Clean;Dry;Intact 03/21/24 2000   Dressing Intervention First dressing 03/21/24 0000   Number of days: 1            Hemodialysis AV Fistula 07/01/21 0800 Left upper arm (Active)   Number of days: 994       Physical examination:  Gen: NAD, AAOx3, answering questions appropriately  HEENT: PERRLA  CV: RR  Resp: NWOB  Abd: Guarding, signifcant abdominal pain diffusely  Ext: moving all extremities spontaneously and purposefully  Neuro: CN II-XII grossly intact    Labs:  Renal:  Recent Labs     03/20/24  1333 03/21/24  0434 03/22/24  0425   BUN 34.1* 38.1* 59.9*   CREATININE 3.80* 4.11* 5.07*     No results for input(s): "LACTIC" in the last 72 hours.  MINO:  Recent Labs     03/20/24  1333 03/21/24  0434 03/22/24  0425    139 142   K 4.6 4.6 5.5*   CO2 21* 24 20*   CALCIUM 10.4* 9.4 8.6   MG 2.70*  --  2.30   PHOS  --   --  6.1*   ALBUMIN " "4.1 3.2* 3.0*   BILITOT 0.8 0.7  --    AST 28 19  --    ALKPHOS 92 73  --    ALT 18 14  --      Heme:  Recent Labs     24  1333 24  0434 24  0425   HGB 14.0 11.0* 10.8*   HCT 46.2 35.8* 35.7*    242 245     ID:  Recent Labs     24  1333 24  0434 24  0425   WBC 13.21* 12.96* 5.47     CBG:  Recent Labs     24  1333 24  0434 24  0425   GLUCOSE 142* 94 60*      Cardiovascular:  No results for input(s): "TROPONINI", "CKTOTAL", "CKMB", "BNP" in the last 168 hours.  ABG:  No results for input(s): "PH", "PO2", "PCO2", "HCO3", "BE" in the last 168 hours.   I have reviewed all pertinent lab results within the past 24 hours.    Imaging:  XR Gastric tube check, non-radiologist performed   Final Result      Nasogastric tube looped over the esophagus.  Repositioning is needed.         Electronically signed by: Rosibel John   Date:    2024   Time:    16:27      CT Abdomen Pelvis  Without Contrast   Final Result      1. Findings most suggestive of small-bowel obstruction.   2. Small volume free intraperitoneal fluid.  Small pleural effusions.         Electronically signed by: Deepti Zapata   Date:    2024   Time:    14:37      X-Ray Abdomen AP 1 View    (Results Pending)      I have reviewed all pertinent imaging results/findings within the past 24 hours.    Micro/Path/Other:  Microbiology Results (last 7 days)       ** No results found for the last 168 hours. **           Pathology Results  (Last 7 days)      None             Assessment & Plan:   78 y F with history CAD status post coronary stent, renal cancer status post nephrectomy, ESRD on dialysis via left upper extremity access, hypertension, COPD, previous  and cholecystectomy      - given continued worsening of condition without return of bowel function, recommend proceeding to OR for exploratory laparotomy    Sunil Keita MD  PGY1    "

## 2024-03-22 NOTE — BRIEF OP NOTE
[facility] - Periop Services  Brief Operative Note    SUMMARY     Surgery Date: 3/22/2024     Surgeon(s) and Role:     * Edmund Echols Jr., MD - Primary    Assisting Surgeon: Sunil Keita MD    Pre-op Diagnosis:  Small bowel obstruction [K56.609]    Post-op Diagnosis:  Post-Op Diagnosis Codes:     * Small bowel obstruction [K56.609]    Procedure(s) (LRB):  LAPAROTOMY, EXPLORATORY (N/A)    Anesthesia: General    Implants:  * No implants in log *    Operative Findings: Dilated, twisted small bowel with scar band release and immediate decrease in dilation with observed peristalsis    Estimated Blood Loss: * No values recorded between 3/22/2024 12:06 PM and 3/22/2024 12:46 PM *    Estimated Blood Loss has not been documented. EBL = minimal.         Specimens:   Specimen (24h ago, onward)      None            [id number]    Sunil Keita MD    Plan: NGT to LIWS, NPO, IVF

## 2024-03-22 NOTE — NURSING
Pt returned from surgery with family at side     Pt noted to be awake and alert with surgery vitals in place     Pain reported 5/10     BSG reported to be 64 in PACU   Will recheck     Call light in reach     Bed in lowest position and hob elevated     Surgical site is noted to be dry and intact with dried red drainage marked at bedside with this nurse. NG to left nare in place and placed to suction per RN SX

## 2024-03-22 NOTE — TRANSFER OF CARE
"Anesthesia Transfer of Care Note    Patient: Deepti Zambrano    Procedure(s) Performed: Procedure(s) (LRB):  LAPAROTOMY, EXPLORATORY (N/A)    Patient location: PACU    Anesthesia Type: general    Transport from OR: Transported from OR on 2-3 L/min O2 by NC with adequate spontaneous ventilation    Post pain: adequate analgesia    Post assessment: no apparent anesthetic complications    Post vital signs: stable    Level of consciousness: sedated    Nausea/Vomiting: no nausea/vomiting    Complications: none    Transfer of care protocol was followed      Last vitals: Visit Vitals  BP (!) 132/50 (BP Location: Right arm, Patient Position: Lying)   Pulse 94   Temp 36.9 °C (98.4 °F) (Oral)   Resp 16   Ht 5' 1" (1.549 m)   Wt 51.3 kg (113 lb 1.5 oz)   SpO2 (!) 92%   BMI 21.37 kg/m²     "

## 2024-03-22 NOTE — PLAN OF CARE
Problem: Adult Inpatient Plan of Care  Goal: Plan of Care Review  Outcome: Ongoing, Progressing  Goal: Patient-Specific Goal (Individualized)  Outcome: Ongoing, Progressing  Goal: Absence of Hospital-Acquired Illness or Injury  Outcome: Ongoing, Progressing  Goal: Optimal Comfort and Wellbeing  Outcome: Ongoing, Progressing  Goal: Readiness for Transition of Care  Outcome: Ongoing, Progressing     Problem: Pain Acute  Goal: Acceptable Pain Control and Functional Ability  Outcome: Ongoing, Progressing     Problem: Infection  Goal: Absence of Infection Signs and Symptoms  Outcome: Ongoing, Progressing     Problem: Device-Related Complication Risk (Hemodialysis)  Goal: Safe, Effective Therapy Delivery  Outcome: Ongoing, Progressing     Problem: Hemodynamic Instability (Hemodialysis)  Goal: Effective Tissue Perfusion  Outcome: Ongoing, Progressing     Problem: Infection (Hemodialysis)  Goal: Absence of Infection Signs and Symptoms  Outcome: Ongoing, Progressing

## 2024-03-22 NOTE — PROGRESS NOTES
[facility] Hospital Medicine Progress Note        Chief Complaint: Inpatient Follow-up for     HPI:   78 yr old female whose history includes CAD, COPD, ESRD on HD, and HTN. Presented to ED with c/o a one day history of abdominal pain, nausea and vomiting. Last bowel movement was 2 days ago and described as smaller and harder. Took stool softeners thinking she was constipated with no relief of symptoms. She's had multiple previous abdominal surgeries. Denies passing any flatus. Does outpatient hemodialysis on Tuesday and Saturday. Last HD was 3/19.      VS on arrival: T 97.3, P 92, R 18, B/P 158/64, Sats 95% on room air. Initial labs: wBC 13.2, Hgb 14, bicarb 21, CHRISTOPHER 34, Hct 3.8 and otherwise unremarkable. CT abdomen/pelvis without contrast showed findings of SBO, small volume free intraperitoneal fluid and small pleural effusions. Multiple attempts of NG tube placement without success.     Interval Hx:   Patient seen and examined by bedside this morning.  Continues to have nausea and states has been having some emesis.  Abdominal pain has remained stable.  No other acute overnight events.  No bowel movement yet along with has not passed any flatus    Case was discussed with patient's nurse and  on the floor.    Objective/physical exam:  General: In no acute distress, afebrile  Chest: Clear to auscultation bilaterally  Heart: RRR, +S1, S2, no appreciable murmur  Abdomen: Soft, nontender, BS +  MSK: Warm, no lower extremity edema, no clubbing or cyanosis  Neurologic: Alert and oriented x4, Cranial nerve II-XII intact, Strength 5/5 in all 4 extremities    VITAL SIGNS: 24 HRS MIN & MAX LAST   Temp  Min: 98 °F (36.7 °C)  Max: 99.3 °F (37.4 °C) 99.3 °F (37.4 °C)   BP  Min: 113/46  Max: 157/62 (!) 121/47   Pulse  Min: 90  Max: 103  94   Resp  Min: 16  Max: 20 20   SpO2  Min: 82 %  Max: 95 % 95 %     I have reviewed the following labs:  Recent Labs   Lab 03/20/24  8614  03/21/24  0434 03/22/24  0425   WBC 13.21* 12.96* 5.47   RBC 4.43 3.37* 3.34*   HGB 14.0 11.0* 10.8*   HCT 46.2 35.8* 35.7*   .3* 106.2* 106.9*   MCH 31.6* 32.6* 32.3*   MCHC 30.3* 30.7* 30.3*   RDW 19.1* 18.9* 18.6*    242 245   MPV 9.8 10.0 9.9       Recent Labs   Lab 03/20/24  1333 03/21/24  0434 03/22/24  0425    139 142   K 4.6 4.6 5.5*   CO2 21* 24 20*   BUN 34.1* 38.1* 59.9*   CREATININE 3.80* 4.11* 5.07*   CALCIUM 10.4* 9.4 8.6   MG 2.70*  --  2.30   ALBUMIN 4.1 3.2* 3.0*   ALKPHOS 92 73  --    ALT 18 14  --    AST 28 19  --    BILITOT 0.8 0.7  --        Microbiology Results (last 7 days)       ** No results found for the last 168 hours. **             See below for Radiology    Scheduled Med:   allopurinoL  100 mg Oral Nightly    amitriptyline  25 mg Oral QHS    amLODIPine  2.5 mg Oral Daily    atorvastatin  20 mg Oral QHS    cetirizine  10 mg Oral Daily    enoxparin  30 mg Subcutaneous Q24H (prophylaxis, 1700)    fluticasone furoate-vilanteroL  1 puff Inhalation Daily    furosemide  40 mg Oral Daily    gabapentin  300 mg Oral Daily    hydrALAZINE  50 mg Oral Q8H    pantoprazole  40 mg Oral Nightly    traZODone  50 mg Oral QHS      Continuous Infusions:   sodium chloride 0.9% 75 mL/hr at 03/21/24 1226      PRN Meds:  acetaminophen, acetaminophen, aluminum-magnesium hydroxide-simethicone, dextrose 10%, dextrose 10%, diphenhydrAMINE, glucagon (human recombinant), glucose, glucose, hydrALAZINE, HYDROmorphone, ketorolac, naloxone, ondansetron, prochlorperazine, sodium chloride 0.9%, sodium chloride 0.9%     Assessment/Plan:  Small-bowel obstruction  ESRD on HD   Hypertension   Leukocytosis, likely reactive  Macrocytic anemia  History of COPD, RCC, bladder cancer, and GERD, gout, CVA with residual left-sided weakness    Multiple attempts for NG tube placement however were unsuccessful   No bowel function seen and patient continues to have nausea and complaints of emesis   General surgery is  proceeding to OR for ex lap today, we will follow the recommendations post ex lap  Nephrology consulted for HD, appreciate recommendations   Continues to remain NPO   Resume home meds as appropriate   Further recs based on clinical course  Labs in a.m.    VTE prophylaxis:  Lovenox    Patient condition:  Fair    Anticipated discharge and Disposition:   Possibly home      All diagnosis and differential diagnosis have been reviewed; assessment and plan has been documented; I have personally reviewed the labs and test results that are presently available; I have reviewed the patients medication list; I have reviewed the consulting providers response and recommendations. I have reviewed or attempted to review medical records based upon their availability    All of the patient's questions have been  addressed and answered. Patient's is agreeable to the above stated plan. I will continue to monitor closely and make adjustments to medical management as needed.  _____________________________________________________________________    Nutrition Status:    Radiology:  I have personally reviewed the following imaging and agree with the radiologist.     X-Ray Abdomen AP 1 View  Narrative: EXAMINATION:  XR ABDOMEN AP 1 VIEW    CLINICAL HISTORY:  Unspecified intestinal obstruction, unspecified as to partial versus complete obstruction SBO;    TECHNIQUE:  AP X-RAY OF THE ABDOMEN:    CPT 17028    FINDINGS:  Examination reveals the gas pattern to be nonspecific with no clear evidence of ileus or obstruction gas identified in loops of large and small bowel.    There are degenerative changes of the lumbosacral spine with a rotatory dextroscoliosis.    No other significant abnormality no mass or calcifications clearly identified  Impression: Nonspecific gas pattern    Electronically signed by: Miles Morris  Date:    03/22/2024  Time:    08:25      Linnette Cantrell DO  Department of Hospital Medicine  [facility]  [place]  03/22/2024

## 2024-03-22 NOTE — ANESTHESIA PREPROCEDURE EVALUATION
03/22/2024  Deepti Zambrano is a 78 y.o., female with ----------------------------  Acid reflux  Anesthesia complication      Comment:  trouble awaking  CAD (coronary artery disease)      Comment:  CORONARY STENT  Chronic kidney disease, unspecified  COPD (chronic obstructive pulmonary disease)  COVID-19      Comment:  week of Mothers Day 2023  Dialysis patient      Comment:  CSI tues AND sat  chair time 0630  Epigastric pain  GERD (gastroesophageal reflux disease)  GI bleed  Gout  High cholesterol  Hx of bladder cancer  Hypertension  Kidney cancer, primary, with metastasis from kidney to other site,   right  Melena  Muscle weakness of extremity      Comment:  bilateral lower  Pneumonia  Post-COVID chronic cough  Sciatic nerve pain  SOB (shortness of breath) on exertion  Stroke      Comment:  1997, small memory loss with  left sided weakness    And ----------------------------  Av fistula placement  Back surgery  Bcg for cancer  Cataract extraction w/  intraocular lens implant  Cholecystectomy  Colonoscopy  Colonoscopy, through stoma, with hemorrhage control      Comment:  Procedure: COLONOSCOPY, THROUGH STOMA, WITH HEMORRHAGE                CONTROL;  Surgeon: German Cottrell MD;  Location:                [facility] ENDOSCOPY;  Service: Gastroenterology;;  Colonoscopy, with polypectomy using snare      Comment:  Procedure: COLON;  Surgeon: German Cottrell MD;                 Location: [facility] ENDOSCOPY;  Service:                Gastroenterology;  Laterality: N/A;  Coronary angioplasty with stent placement      Comment:  Dr. Strickland 18 years ago  Diagnostic laparoscopy      Comment:  Procedure: LAPAROSCOPY, DIAGNOSTIC;  Surgeon: Edmund Echols Jr., MD;  Location: [facility] OR;  Service: General;                 Laterality: N/A;  Diagnostic laparoscopy      Comment:  Procedure:  LAPAROSCOPY, DIAGNOSTIC;  Surgeon: Edmund Echols Jr., MD;  Location: [facility] OR;  Service: General;                 Laterality: N/A;  Esophagogastroduodenoscopy      Comment:  Procedure: EGD;  Surgeon: Elan Leigh MD;                 Location: [facility] ENDOSCOPY;  Service:                Gastroenterology;  Laterality: N/A;  Esophagogastroduodenoscopy      Comment:  Procedure: DOUBLE;  Surgeon: German Cottrell MD;                Location: [facility] ENDOSCOPY;  Service:                Gastroenterology;  Laterality: N/A;  Fistulogram      Comment:  Procedure: Fistulogram;  Surgeon: Pradip Erwin DO;                 Location: [facility] CATH LAB;  Service: Nephrology;                 Laterality: Left;  Hernia repair  Hysterectomy  Laparoscopic revision of procedure involving peritoneal dialysis   catheter      Comment:  Procedure: REVISION OF PROCEDURE INVOLVING PERITONEAL                DIALYSIS CATHETER, LAPAROSCOPIC;  Surgeon: Edmund Echols Jr., MD;  Location: [facility] OR;  Service: General;;  Neck surgery      Comment:  cervical  Peritoneal catheter insertion  Removal, catheter, dialysis, peritoneal, laparoscopic      Comment:  Procedure: REMOVAL, CATHETER, DIALYSIS, PERITONEAL,                LAPAROSCOPIC;  Surgeon: Edmund Echols Jr., MD;                 Location: [facility] OR;  Service: General;  Laterality: N/A;                 LAP VENTRAL HERNIA REPAIR NOT PERFORMED DUE TO MESH IN                PLACE, NOT NEEDED   Right kidney removed  Tonsillectomy    Presents for EGD/Colonoscopy.  Several general anesthetics but I was the anesthesiologist for previous EGD in Aug and there were no noted anesthetic issues  Last HD was yesterday and today's K+ is 3.1  Pre-op Assessment    I have reviewed the NPO Status.      Review of Systems     Latest Reference Range & Units 08/25/23 05:39 08/25/23 05:43   WBC 4.50 - 11.50 x10(3)/mcL  9.73   Hemoglobin 12.0 - 16.0 g/dL  10.9 (L)    Hematocrit 37.0 - 47.0 %  33.7 (L)   Platelet Count 130 - 400 x10(3)/mcL  204   Sodium 136 - 145 mmol/L 143    Potassium 3.5 - 5.1 mmol/L 4.4    Chloride 98 - 107 mmol/L 110 (H)    CO2 23 - 31 mmol/L 22 (L)    BUN 9.8 - 20.1 mg/dL 43.2 (H)    Creatinine 0.55 - 1.02 mg/dL 4.37 (H)    eGFR mls/min/1.73/m2 10    Glucose 82 - 115 mg/dL 90    (L): Data is abnormally low  (H): Data is abnormally high    Physical Exam  General: Well nourished, Cooperative, Alert and Oriented    Airway:  Mallampati: II   Mouth Opening: Normal  TM Distance: Normal  Tongue: Normal  Neck ROM: Normal ROM    Dental:  Dentures  To be removed  Chest/Lungs:  Clear to auscultation, Normal Respiratory Rate    Heart:  Rate: Normal  Rhythm: Regular Rhythm    Musculoskeletal:  Dialysis access LUE  Skin:  Yoesf complexion, ecchymosis on arms      Anesthesia Plan  Type of Anesthesia, risks & benefits discussed:    Anesthesia Type: Gen ETT  Intra-op Monitoring Plan: Standard ASA Monitors  Post Op Pain Control Plan: multimodal analgesia  Induction:  IV  Airway Plan: Direct  Informed Consent: Informed consent signed with the Patient and all parties understand the risks and agree with anesthesia plan.  All questions answered.   ASA Score: 4  Day of Surgery Review of History & Physical: H&P Update referred to the surgeon/provider.  Anesthesia Plan Notes: Nasal cannula vs facemask supplemental oxygenation   For patients with THUY/obesity, may consider SuperNoval Nasal CPAP      Ready For Surgery From Anesthesia Perspective.     .

## 2024-03-22 NOTE — PROGRESS NOTES
Inpatient Nutrition Evaluation    Admit Date: 3/20/2024   Total duration of encounter: 2 days   Patient Age: 78 y.o.    Nutrition Recommendation/Prescription     -Advance diet as tolerated per MD. Goal Diet: Renal Diet.   -Monitor wt and labs.     Nutrition Assessment     Chart Review    Reason Seen: continuous nutrition monitoring    Malnutrition Screening Tool Results   Have you recently lost weight without trying?: No  Have you been eating poorly because of a decreased appetite?: No   MST Score: 0   Diagnosis:  ESRD (HD Q T/S)  SBO  Anemia   Metabolic bone disease, hyperphosphatemia  HTN  H/o GI bleed  CAD with remote stenting.On Plavix  Carotid artery disease (right total occlusion, left 40-60% )  Prior CVA (12/2019) with minimal left sided weakness  Right Kidney cancer with Mets, status post right nephrectomy  Bladder cancer    Relevant Medical History: CAD status post coronary stent, renal cancer status post nephrectomy, ESRD on dialysis via left upper extremity access, hypertension, COPD     Scheduled Medications:  allopurinoL, 100 mg, Nightly  amitriptyline, 25 mg, QHS  amLODIPine, 2.5 mg, Daily  atorvastatin, 20 mg, QHS  cetirizine, 10 mg, Daily  enoxparin, 30 mg, Q24H (prophylaxis, 1700)  fluticasone furoate-vilanteroL, 1 puff, Daily  furosemide, 40 mg, Daily  gabapentin, 300 mg, Daily  hydrALAZINE, 50 mg, Q8H  pantoprazole, 40 mg, Nightly  traZODone, 50 mg, QHS    Continuous Infusions:  sodium chloride 0.9%, Last Rate: 75 mL/hr at 03/21/24 1226    PRN Medications: acetaminophen, acetaminophen, aluminum-magnesium hydroxide-simethicone, dextrose 10%, dextrose 10%, diphenhydrAMINE, glucagon (human recombinant), glucose, glucose, hydrALAZINE, ketorolac, naloxone, ondansetron, prochlorperazine, sodium chloride 0.9%, sodium chloride 0.9%    Recent Labs   Lab 03/20/24  1333 03/21/24  0434 03/22/24  0425    139 142   K 4.6 4.6 5.5*   CALCIUM 10.4* 9.4 8.6   PHOS  --   --  6.1*   MG 2.70*  --  2.30  "  CHLORIDE 101 103 104   CO2 21* 24 20*   BUN 34.1* 38.1* 59.9*   CREATININE 3.80* 4.11* 5.07*   EGFRNORACEVR 12 11 8   GLUCOSE 142* 94 60*   BILITOT 0.8 0.7  --    ALKPHOS 92 73  --    ALT 18 14  --    AST 28 19  --    ALBUMIN 4.1 3.2* 3.0*   LIPASE 21  --   --    WBC 13.21* 12.96* 5.47   HGB 14.0 11.0* 10.8*   HCT 46.2 35.8* 35.7*     Nutrition Orders:  Diet NPO      Appetite/Oral Intake: NPO/NPO  Factors Affecting Nutritional Intake: NPO  Food/Latter-day/Cultural Preferences: unable to obtain  Food Allergies: no known food allergies  Last Bowel Movement: 03/19/24  Wound(s):  surgical incision     Comments    3/22: Pt currently NPO; noted weights fluctuate per EMR; however, pt is also a dialysis pt; will f/u early with pt.     Anthropometrics    Height: 5' 1" (154.9 cm), Height Method: Stated  Last Weight: 51.3 kg (113 lb 1.5 oz) (03/20/24 2325), Weight Method: Bed Scale  BMI (Calculated): 21.4  BMI Classification: underweight (BMI less than 22 if >65 years of age)     Ideal Body Weight (IBW), Female: 105 lb     % Ideal Body Weight, Female (lb): 107.71 %                             Usual Weight Provided By: EMR weight history    Wt Readings from Last 5 Encounters:   03/20/24 51.3 kg (113 lb 1.5 oz)   02/05/24 52.6 kg (116 lb)   11/22/23 55.8 kg (123 lb)   10/23/23 55.3 kg (122 lb)   09/28/23 55.7 kg (122 lb 12.7 oz)     Weight Change(s) Since Admission:   Wt Readings from Last 1 Encounters:   03/20/24 2325 51.3 kg (113 lb 1.5 oz)   03/20/24 1311 45.4 kg (100 lb)   Admit Weight: 45.4 kg (100 lb) (03/20/24 1311), Weight Method: Stated    Patient Education     Not applicable.    Nutrition Goals & Monitoring     Dietitian will monitor: energy intake, weight change, and electrolyte/renal panel    Nutrition Risk/Follow-Up: low (follow-up in 5-7 days)  Patients assigned 'low nutrition risk' status do not qualify for a full nutritional assessment but will be monitored and re-evaluated in a 5-7 day time period. Please " consult if re-evaluation needed sooner.

## 2024-03-23 NOTE — PROCEDURES
"Deepti Zambrano is a 78 y.o. female patient.    Temp: 98.2 °F (36.8 °C) (03/23/24 1545)  Pulse: 104 (03/23/24 1708)  Resp: (!) 24 (03/23/24 1708)  BP: (!) 77/41 (03/23/24 1600)  SpO2: 97 % (03/23/24 1708)  Weight: 51.3 kg (113 lb 1.5 oz) (03/20/24 2325)  Height: 5' 1" (154.9 cm) (03/20/24 2325)    PICC  Date/Time: 3/23/2024 6:06 PM  Performed by: Alistair Jimenez RN  Consent Done: Yes  Time out: Immediately prior to procedure a time out was called to verify the correct patient, procedure, equipment, support staff and site/side marked as required  Indications: vascular access and med administration  Anesthesia: local infiltration  Local anesthetic: lidocaine 1% without epinephrine  Anesthetic Total (mL): 5  Preparation: skin prepped with ChloraPrep  Skin prep agent dried: skin prep agent completely dried prior to procedure  Sterile barriers: all five maximum sterile barriers used - cap, mask, sterile gown, sterile gloves, and large sterile sheet  Hand hygiene: hand hygiene performed prior to central venous catheter insertion  Location details: right brachial  Catheter type: triple lumen  Catheter size: 5 Fr  Catheter Length: 35cm    Ultrasound guidance: yes  Vessel Caliber: medium and patent, compressibility normal  Needle advanced into vessel with real time Ultrasound guidance.  Guidewire confirmed in vessel.  Sterile sheath used.  Number of attempts: 1  Post-procedure: blood return through all ports and sterile dressing applied    Assessment: placement verified by x-ray  Complications: none  Comments: Patient IV access consisted of midline in R arm that was approved by Dr. Grajeda, Pressors currently infusing to midline. Pulled midline back while keeping line in vessel to access vessel higher in arm for PICC placement.           Name Alistair Jimenez RN  3/23/2024    "

## 2024-03-23 NOTE — CONSULTS
Inpatient consult to Cardiology  Consult performed by: Scott Lim ANP  Consult ordered by: Reyes, Thairy G, DO  Reason for consult: PAF/RVR with Hypotension        [facility]    Cardiology  Consult Note    Patient Name: Deepti Zambrano  MRN: [id number]  Admission Date: 3/20/2024  Hospital Length of Stay: 3 days  Code Status: Full Code   Attending Provider: Linnette Cantrell DO   Consulting Provider: RACHEL Quinones  Primary Care Physician: Saleem Juan MD  Principal Problem:<principal problem not specified>    Patient information was obtained from patient, past medical records, and ER records.     Subjective:     Chief Complaint/Reason for Consult: PAF/RVR    HPI: Ms. Zambrano is a 79 y/o female who is known to [facility], Dr. Gordon. The patient presented to [facility] on 3.20.24 with c/o Abdominal Pain and N/V. The patient reported that about 2 days prior to presentation she did have a BM, however, she felt she was constipated and she took a stool softener.  Upon arrival in the ER she was found to have: WBC 13.2, H&H 14.0/46.2, BUN/Crea 34.1/3.8 and a CT Abd/Pelvis without Contrast that was consistent with SBO, Small Volume Free Intraperitoneal Fluid and Small Pleural Effusion. She had a NGT Placed and was admitted to Hospital Medicine and Surgical Team was consulted for recommendations. On 3.22.24 she underwent a Exploratory Laparotomy with noted Dilated, Twisted Small Bowel with Scar Band released and Immediate decrease in Dilation with Observed Peristalsis. She was monitored post operatively and admitted to the Post Operative Team. While recovering she was found to have a Tachycardic Rhythm which was confirmed to be A.Fib with RVR. She was given IV Bolus of Amiodarone with not improvement in HR/BP. She was Urgently Transferred to ICU for further workup and management. [facility] was consulted for PAF/RVR and Hypotension.    PMH: CAD/Stents, COPD, HLD, CMO/EF 40%, HTN, MIKALA/Bilaterally Mild, KARY,  Renal/Bladder Carcinoma, THUY/CPAP, CVA, GERD, Gout, Sciatica, COVID-19 Infection  PSH: AV Fistula, Neck Surgery, Head Surgery, Cholecystectomy, Tonsillectomy, THAD, Nephrectomy, EGD, Colonoscopy, Fistulogram, Hernia Repair, Back Surgery, Multiple Previous Abdominal Surgeries   Family History: Father, D, 47, MI; Mother, D, 59, Cancer  Social History: [place]iet Illicit Drug and ETOH Use; + Tobacco Use, 1ppd for 50+ Years    Previous Cardiac Diagnostics:   PET 10.30.23:  This is an abnormal perfusion study. Study is consistent with mild mid to basal inferolateral ischemia. EF mildly reduced as well.    This scan is suggestive of moderate risk for future cardiovascular events.   Medium reversible perfusion abnormality of mild intensity in the inferior lateral region.   The left ventricular cavity is noted to be mildly enlarged on the stress studies. The stress left ventricular ejection fraction was calculated to be 41% and left ventricular global function is mildly reduced. The rest left ventricular cavity is noted to be mildly enlarged. The rest left ventricular ejection fraction was calculated to be 41% and rest left ventricular global function is mildly reduced.   Persistent hypokinesis of the anterior region, septal region, inferior region and lateral region is noted in both rest and stress studies. Compared with rest and stress studies the ejection fraction remains unchanged (41).   The study quality is good.   Myocardial blood flow reserve was not performed in this patient due to specific concerns that can affect accuracy.    ECHO 5.13.23:  Eccentric hypertrophy and mildly decreased systolic function.  Mild pulmonic regurgitation.  Mild tricuspid regurgitation.  Moderate mitral regurgitation.  Normal right ventricular size with normal right ventricular systolic function.  The estimated ejection fraction is 40%.  Grade I left ventricular diastolic dysfunction.    Carotid US 8.25.22:  The study quality is average.   The  gray scale analysis shows a total occlusionof the right internal carotid artery  40-59% stenosis in the proximal left internal carotid artery based on Bluth Criteria.   Antegrade right vertebral artery flow.   Antegrade left vertebral artery flow.     [facility] 7.22.13:  L Main - Patent  LAD - Diffuse 40% Mid Vessel Stenosis with a Mild Bridge in the Mid Portion.  LCX - Patent Distal Stent with a 20% Stenosis Proximally to the Stent and the 40% Stenosis Distal to the Stent.  RCA - Dominant - Vessel is Patent with Lis  L Renal - 50% Stenosis with a 44% Stenosis by IVUS with Mean Cross-Sectional Luminal Area of 9.8. There is < 5mmHg Gradient  Summary: Mild to Moderate CAD with Patent Coronary Stent, Mod L KARY    Review of patient's allergies indicates:   Allergen Reactions    Penicillins Itching and Hallucinations    Ciprofloxacin Itching    Codeine Itching     No current facility-administered medications on file prior to encounter.     Current Outpatient Medications on File Prior to Encounter   Medication Sig    amitriptyline (ELAVIL) 25 MG tablet Take 25 mg by mouth every evening.    amLODIPine (NORVASC) 2.5 MG tablet Take 2.5 mg by mouth once daily.    atorvastatin (LIPITOR) 20 MG tablet Take 20 mg by mouth once daily.    clopidogreL (PLAVIX) 75 mg tablet Take 75 mg by mouth once daily.    furosemide (LASIX) 80 MG tablet Take 40 mg by mouth once daily.    hydrALAZINE (APRESOLINE) 50 MG tablet Take 50 mg by mouth every 8 (eight) hours.    levocetirizine (XYZAL) 5 MG tablet Take 5 mg by mouth every evening.    metoprolol succinate (TOPROL-XL) 50 MG 24 hr tablet Take 50 mg by mouth once daily.    pantoprazole (PROTONIX) 40 MG tablet Take 40 mg by mouth nightly.    traZODone (DESYREL) 50 MG tablet Take 50 mg by mouth every evening.    allopurinoL (ZYLOPRIM) 100 MG tablet Take 100 mg by mouth nightly.    docusate sodium (COLACE) 50 MG capsule Take 150 mg by mouth every evening.    fluticasone-salmeterol diskus inhaler 250-50  mcg Inhale 1 puff into the lungs 2 (two) times daily. Controller    folic acid-B uysf-O-tsiuv-zinc (DIALYVITE 3000) 3-70-15 mg-mcg-mg Tab Take by mouth once daily.    gabapentin (NEURONTIN) 300 MG capsule Take 300 mg by mouth Daily.    magnesium oxide (MAG-OX) 400 mg (241.3 mg magnesium) tablet Take 400 mg by mouth 2 (two) times daily.    psyllium 0.52 gram capsule Take 0.52 g by mouth once daily.    sodium bicarbonate 650 MG tablet Take 1,300 mg by mouth 2 (two) times daily.     Review of Systems   Unable to perform ROS: Acuity of condition     Objective:     Vital Signs (Most Recent):  Temp: 98.7 °F (37.1 °C) (03/23/24 1320)  Pulse: (!) 156 (03/23/24 1320)  Resp: 18 (03/23/24 0958)  BP: (!) 70/36 (03/23/24 1320)  SpO2: (!) 88 % (03/23/24 1320) Vital Signs (24h Range):  Temp:  [97.4 °F (36.3 °C)-99.5 °F (37.5 °C)] 98.7 °F (37.1 °C)  Pulse:  [] 156  Resp:  [16-18] 18  SpO2:  [88 %-100 %] 88 %  BP: ()/(36-63) 70/36   Weight: 51.3 kg (113 lb 1.5 oz)  Body mass index is 21.37 kg/m².  SpO2: (!) 88 %       Intake/Output Summary (Last 24 hours) at 3/23/2024 1520  Last data filed at 3/23/2024 1213  Gross per 24 hour   Intake 900 ml   Output 1400 ml   Net -500 ml     Lines/Drains/Airways       Drain  Duration                  NG/OG Tube 03/22/24 Left nostril 1 day         Urethral Catheter 03/22/24 1157 Silicone 16 Fr. 1 day              Peripheral Intravenous Line  Duration                  Hemodialysis AV Fistula 07/01/21 0800 Left upper arm 996 days         Peripheral IV - Single Lumen 03/21/24 0000 22 G Distal;Posterior;Right Forearm 2 days         Midline Catheter - Single Lumen 03/23/24 1240 Right basilic vein (medial side of arm) <1 day                  Significant Labs:  Recent Results (from the past 72 hour(s))   Urinalysis, Reflex to Urine Culture    Collection Time: 03/20/24 10:37 PM    Specimen: Urine   Result Value Ref Range    Color, UA Yellow Yellow, Light-Yellow, Colorless, Straw, Dark-Yellow     Appearance, UA Clear Clear    Specific Gravity, UA 1.015 1.005 - 1.030    pH, UA 7.0 5.0 - 8.5    Protein, UA 3+ (A) Negative    Glucose, UA 1+ (A) Negative, Normal    Ketones, UA Negative Negative    Blood, UA Negative Negative    Bilirubin, UA Negative Negative    Urobilinogen, UA Normal 0.2, 1.0, Normal    Nitrites, UA Negative Negative    Leukocyte Esterase, UA Negative Negative    WBC, UA 0-5 None Seen, 0-2, 3-5, 0-5 /HPF    Bacteria, UA None Seen None Seen, Trace /HPF    Squamous Epithelial Cells, UA Trace None Seen /HPF    Mucous, UA Trace (A) None Seen /LPF    RBC, UA 0-5 None Seen, 0-2, 3-5, 0-5 /HPF   Comprehensive Metabolic Panel    Collection Time: 03/21/24  4:34 AM   Result Value Ref Range    Sodium Level 139 136 - 145 mmol/L    Potassium Level 4.6 3.5 - 5.1 mmol/L    Chloride 103 98 - 107 mmol/L    Carbon Dioxide 24 23 - 31 mmol/L    Glucose Level 94 82 - 115 mg/dL    Blood Urea Nitrogen 38.1 (H) 9.8 - 20.1 mg/dL    Creatinine 4.11 (H) 0.55 - 1.02 mg/dL    Calcium Level Total 9.4 8.4 - 10.2 mg/dL    Protein Total 5.8 5.8 - 7.6 gm/dL    Albumin Level 3.2 (L) 3.4 - 4.8 g/dL    Globulin 2.6 2.4 - 3.5 gm/dL    Albumin/Globulin Ratio 1.2 1.1 - 2.0 ratio    Bilirubin Total 0.7 <=1.5 mg/dL    Alkaline Phosphatase 73 40 - 150 unit/L    Alanine Aminotransferase 14 0 - 55 unit/L    Aspartate Aminotransferase 19 5 - 34 unit/L    eGFR 11 mls/min/1.73/m2   CBC with Differential    Collection Time: 03/21/24  4:34 AM   Result Value Ref Range    WBC 12.96 (H) 4.50 - 11.50 x10(3)/mcL    RBC 3.37 (L) 4.20 - 5.40 x10(6)/mcL    Hgb 11.0 (L) 12.0 - 16.0 g/dL    Hct 35.8 (L) 37.0 - 47.0 %    .2 (H) 80.0 - 94.0 fL    MCH 32.6 (H) 27.0 - 31.0 pg    MCHC 30.7 (L) 33.0 - 36.0 g/dL    RDW 18.9 (H) 11.5 - 17.0 %    Platelet 242 130 - 400 x10(3)/mcL    MPV 10.0 7.4 - 10.4 fL    Neut % 81.8 %    Lymph % 9.4 %    Mono % 6.4 %    Eos % 1.5 %    Basophil % 0.5 %    Lymph # 1.22 0.6 - 4.6 x10(3)/mcL    Neut # 10.59 (H) 2.1 - 9.2  x10(3)/mcL    Mono # 0.83 0.1 - 1.3 x10(3)/mcL    Eos # 0.20 0 - 0.9 x10(3)/mcL    Baso # 0.07 <=0.2 x10(3)/mcL    IG# 0.05 (H) 0 - 0.04 x10(3)/mcL    IG% 0.4 %    NRBC% 0.0 %   TSH    Collection Time: 03/21/24  4:34 AM   Result Value Ref Range    TSH 0.906 0.350 - 4.940 uIU/mL   Vitamin B12    Collection Time: 03/21/24  4:34 AM   Result Value Ref Range    Vitamin B12 Level 470 213 - 816 pg/mL   Magnesium    Collection Time: 03/22/24  4:25 AM   Result Value Ref Range    Magnesium Level 2.30 1.60 - 2.60 mg/dL   CBC with Differential    Collection Time: 03/22/24  4:25 AM   Result Value Ref Range    WBC 5.47 4.50 - 11.50 x10(3)/mcL    RBC 3.34 (L) 4.20 - 5.40 x10(6)/mcL    Hgb 10.8 (L) 12.0 - 16.0 g/dL    Hct 35.7 (L) 37.0 - 47.0 %    .9 (H) 80.0 - 94.0 fL    MCH 32.3 (H) 27.0 - 31.0 pg    MCHC 30.3 (L) 33.0 - 36.0 g/dL    RDW 18.6 (H) 11.5 - 17.0 %    Platelet 245 130 - 400 x10(3)/mcL    MPV 9.9 7.4 - 10.4 fL    Neut % 76.2 %    Lymph % 9.7 %    Mono % 11.9 %    Eos % 1.1 %    Basophil % 0.9 %    Lymph # 0.53 (L) 0.6 - 4.6 x10(3)/mcL    Neut # 4.17 2.1 - 9.2 x10(3)/mcL    Mono # 0.65 0.1 - 1.3 x10(3)/mcL    Eos # 0.06 0 - 0.9 x10(3)/mcL    Baso # 0.05 <=0.2 x10(3)/mcL    IG# 0.01 0 - 0.04 x10(3)/mcL    IG% 0.2 %    NRBC% 0.0 %   Renal Function Panel    Collection Time: 03/22/24  4:25 AM   Result Value Ref Range    Sodium Level 142 136 - 145 mmol/L    Potassium Level 5.5 (H) 3.5 - 5.1 mmol/L    Chloride 104 98 - 107 mmol/L    Carbon Dioxide 20 (L) 23 - 31 mmol/L    Glucose Level 60 (L) 82 - 115 mg/dL    Blood Urea Nitrogen 59.9 (H) 9.8 - 20.1 mg/dL    Creatinine 5.07 (H) 0.55 - 1.02 mg/dL    Calcium Level Total 8.6 8.4 - 10.2 mg/dL    Albumin Level 3.0 (L) 3.4 - 4.8 g/dL    Phosphorus Level 6.1 (H) 2.3 - 4.7 mg/dL    eGFR 8 mls/min/1.73/m2   PTH, Intact    Collection Time: 03/22/24  4:25 AM   Result Value Ref Range    Parathyroid Hormone Intact 347.4 (H) 8.7 - 77.0 pg/mL   Folate    Collection Time: 03/22/24   4:25 AM   Result Value Ref Range    Folate Level 16.4 7.0 - 31.4 ng/mL   Type & Screen    Collection Time: 03/22/24 10:55 AM   Result Value Ref Range    Group & Rh A POS     Indirect Avinash GEL NEG     Specimen Outdate 03/25/2024 23:59    POCT glucose    Collection Time: 03/22/24  2:52 PM   Result Value Ref Range    POCT Glucose 64 (L) 70 - 110 mg/dL   POCT glucose    Collection Time: 03/22/24  3:29 PM   Result Value Ref Range    POCT Glucose 66 (L) 70 - 110 mg/dL   POCT Glucose, Hand-Held Device    Collection Time: 03/22/24  3:49 PM   Result Value Ref Range    POC Glucose 109 70 - 110 MG/DL   POCT glucose    Collection Time: 03/22/24  5:34 PM   Result Value Ref Range    POCT Glucose 109 70 - 110 mg/dL   Magnesium    Collection Time: 03/23/24  4:12 AM   Result Value Ref Range    Magnesium Level 2.10 1.60 - 2.60 mg/dL   CBC with Differential    Collection Time: 03/23/24  4:12 AM   Result Value Ref Range    WBC 1.66 (LL) 4.50 - 11.50 x10(3)/mcL    RBC 2.87 (L) 4.20 - 5.40 x10(6)/mcL    Hgb 9.2 (L) 12.0 - 16.0 g/dL    Hct 30.6 (L) 37.0 - 47.0 %    .6 (H) 80.0 - 94.0 fL    MCH 32.1 (H) 27.0 - 31.0 pg    MCHC 30.1 (L) 33.0 - 36.0 g/dL    RDW 18.6 (H) 11.5 - 17.0 %    Platelet 201 130 - 400 x10(3)/mcL    MPV 9.9 7.4 - 10.4 fL    NRBC% 0.0 %   Renal Function Panel    Collection Time: 03/23/24  4:12 AM   Result Value Ref Range    Sodium Level 141 136 - 145 mmol/L    Potassium Level 5.1 3.5 - 5.1 mmol/L    Chloride 108 (H) 98 - 107 mmol/L    Carbon Dioxide 17 (L) 23 - 31 mmol/L    Glucose Level 67 (L) 82 - 115 mg/dL    Blood Urea Nitrogen 70.8 (H) 9.8 - 20.1 mg/dL    Creatinine 5.55 (H) 0.55 - 1.02 mg/dL    Calcium Level Total 7.9 (L) 8.4 - 10.2 mg/dL    Albumin Level 3.0 (L) 3.4 - 4.8 g/dL    Phosphorus Level 7.8 (H) 2.3 - 4.7 mg/dL    eGFR 7 mls/min/1.73/m2   Manual Differential    Collection Time: 03/23/24  4:12 AM   Result Value Ref Range    WBC 1.66 x10(3)/mcL    Neutrophils % 69 %    Lymphs % 25 %    Monocytes %  4 %    Eosinophils % 1 %    Metamyelocytes % 2 (H) <=0 %    Neutrophils Abs 1.1454 (L) 2.1 - 9.2 x10(3)/mcL    Lymphs Abs 0.415 (L) 0.6 - 4.6 x10(3)/mcL    Monocytes Abs 0.0664 (L) 0.1 - 1.3 x10(3)/mcL    Eosinophils Abs 0.0166 0 - 0.9 x10(3)/mcL    Platelets Normal Normal, Adequate    RBC Morph Abnormal (A) Normal    Poikilocytosis 1+ (A) (none)    Anisocytosis 1+ (A) (none)    Macrocytosis 1+ (A) (none)    Ovalocytes 1+ (A) (none)   Path Review, Peripheral Smear    Collection Time: 03/23/24  4:12 AM   Result Value Ref Range    Peripheral Smear Evaluation       Absolute neutropenia, lymphopenia and monocytopenia with macrocytic anemia and anisocytosis with occasional polychromasia and oval macrocytes.  Findings may reflect infection, drug or toxin effect, chemotherapy, B12/folate deficiency, hemolysis or marrow abnormality, among others.  No immature myeloid cells or blasts identified.  Platelet count WNL.    Len Olea M.D.   Hepatitis B Surface Antigen    Collection Time: 03/23/24  4:12 AM   Result Value Ref Range    Hepatitis B Surface Antigen Nonreactive Nonreactive   Prealbumin    Collection Time: 03/23/24  8:11 AM   Result Value Ref Range    Prealbumin 10.2 (L) 14.0 - 37.0 mg/dL   Troponin I    Collection Time: 03/23/24  1:23 PM   Result Value Ref Range    Troponin-I 0.079 (H) 0.000 - 0.045 ng/mL   RT Blood Gas    Collection Time: 03/23/24  3:02 PM   Result Value Ref Range    Sample Type Arterial Blood     Sample site Arterial Line     Drawn by ERICA Alonzo, WALTER     pH, Blood gas 7.280 (LL) 7.350 - 7.450    pCO2, Blood gas 36.0 35.0 - 45.0 mmHg    pO2, Blood gas 59.0 (L) 80.0 - 100.0 mmHg    Sodium, Blood Gas 135 (L) 137 - 145 mmol/L    Potassium, Blood Gas 4.4 3.5 - 5.0 mmol/L    Calcium Level Ionized 1.02 (L) 1.12 - 1.23 mmol/L    TOC2, Blood gas 18.0 mmol/L    Base Excess, Blood gas -9.10 (L) -2.00 - 2.00 mmol/L    sO2, Blood gas 86.3 %    HCO3, Blood gas 16.9 (L) 22.0 - 26.0 mmol/L    THb, Blood gas 8.8 (L)  12 - 16 g/dL    O2 Hb, Blood Gas 88.2 (L) 94.0 - 97.0 %    CO Hgb 2.1 (H) 0.5 - 1.5 %    Met Hgb 0.0 (L) 0.4 - 1.5 %    Allens Test Yes     LPM 5.0      Significant Imaging:  Imaging Results              XR Gastric tube check, non-radiologist performed (Final result)  Result time 03/20/24 16:27:18      Final result by Rosibel John MD (03/20/24 16:27:18)                   Impression:      Nasogastric tube looped over the esophagus.  Repositioning is needed.      Electronically signed by: Rosibel John  Date:    03/20/2024  Time:    16:27               Narrative:    EXAMINATION:  XR GASTRIC TUBE CHECK, NON-RADIOLOGIST PERFORMED    CLINICAL HISTORY:  post placement;    COMPARISON:  None.    FINDINGS:  The nasogastric tube is looped over the esophagus, with the tip pointing in the incorrect direction                                       CT Abdomen Pelvis  Without Contrast (Final result)  Result time 03/20/24 14:37:00      Final result by Deepti Zapata MD (03/20/24 14:37:00)                   Impression:      1. Findings most suggestive of small-bowel obstruction.  2. Small volume free intraperitoneal fluid.  Small pleural effusions.      Electronically signed by: Deepti Zapata  Date:    03/20/2024  Time:    14:37               Narrative:    EXAMINATION:  CT ABDOMEN PELVIS WITHOUT CONTRAST    CLINICAL HISTORY:  Abdominal pain, acute, nonlocalized;    TECHNIQUE:  Helically acquired axial images, sagittal and coronal reformations were obtained from the lung bases to the pubic symphysis without the IV administration of contrast.    Automated tube current modulation, weight-based exposure dosing, and/or iterative reconstruction technique utilized to reach lowest reasonably achievable exposure rate.    DLP: 348 mGy*cm    COMPARISON:  CT abdomen pelvis 08/24/2023    FINDINGS:  HEART: There are coronary artery calcifications.    LUNG BASES: Small pleural effusions layering dependently.  Basilar  atelectasis.    LIVER: Normal attenuation. No appreciable focal hepatic lesion.    BILIARY: Gallbladder is surgically absent.    PANCREAS: No inflammatory change.    SPLEEN: Normal in size    ADRENALS: No mass.    KIDNEYS/URETERS: Postop right nephrectomy.  No left hydronephrosis.  Unchanged appearance of the left kidney.    GI TRACT/MESENTERY: Evaluation of the bowel is limited without contrast. Small bowel loops are dilated measuring up to 3.6 cm in diameter and are fluid-filled with scattered air-fluid levels in small bowel feces sign compatible with delayed transit.  The terminal ileum is decompressed.  Transition point seen in the lower abdomen.  There is mesenteric edema.     Diverticulosis.    PERITONEUM: Small volume free fluid.  No free air seen.No free air.    LYMPH NODES: No enlarged lymph nodes by size criteria.    VASCULATURE: Aortoiliac atherosclerosis.    BLADDER: Normal appearance given degree of distention.    REPRODUCTIVE ORGANS: Postop hysterectomy.    ABDOMINAL WALL: Unremarkable.    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  Schmorl's node at L4.                                    EKG:       Telemetry: PAF/RVR    Physical Exam  Constitutional:       General: She is not in acute distress.     Appearance: Normal appearance.      Comments: Vented/Sedated   HENT:      Head: Normocephalic.      Mouth/Throat:      Mouth: Mucous membranes are moist.   Eyes:      Conjunctiva/sclera: Conjunctivae normal.   Cardiovascular:      Rate and Rhythm: Tachycardia present. Rhythm irregular.      Pulses: Normal pulses.      Heart sounds: Murmur heard.   Pulmonary:      Effort: Pulmonary effort is normal. No respiratory distress.      Breath sounds: Normal breath sounds.      Comments: Ventilator Associated Breath Sounds  Abdominal:      Palpations: Abdomen is soft.      Comments: Midline Abd Dressing C/D/I; + NGT   Musculoskeletal:         General: No swelling.   Skin:     General: Skin is warm.      Comments: Midline Sternotomy  Dressing C/D/I. + CTs   Neurological:      Comments: Vented/Sedated       Home Medications:   No current facility-administered medications on file prior to encounter.     Current Outpatient Medications on File Prior to Encounter   Medication Sig Dispense Refill    amitriptyline (ELAVIL) 25 MG tablet Take 25 mg by mouth every evening.      amLODIPine (NORVASC) 2.5 MG tablet Take 2.5 mg by mouth once daily.      atorvastatin (LIPITOR) 20 MG tablet Take 20 mg by mouth once daily.      clopidogreL (PLAVIX) 75 mg tablet Take 75 mg by mouth once daily.      furosemide (LASIX) 80 MG tablet Take 40 mg by mouth once daily.      hydrALAZINE (APRESOLINE) 50 MG tablet Take 50 mg by mouth every 8 (eight) hours.      levocetirizine (XYZAL) 5 MG tablet Take 5 mg by mouth every evening.      metoprolol succinate (TOPROL-XL) 50 MG 24 hr tablet Take 50 mg by mouth once daily.      pantoprazole (PROTONIX) 40 MG tablet Take 40 mg by mouth nightly.      traZODone (DESYREL) 50 MG tablet Take 50 mg by mouth every evening.      allopurinoL (ZYLOPRIM) 100 MG tablet Take 100 mg by mouth nightly.      docusate sodium (COLACE) 50 MG capsule Take 150 mg by mouth every evening.      fluticasone-salmeterol diskus inhaler 250-50 mcg Inhale 1 puff into the lungs 2 (two) times daily. Controller      folic acid-B sjrf-M-qgipq-zinc (DIALYVITE 3000) 3-70-15 mg-mcg-mg Tab Take by mouth once daily.      gabapentin (NEURONTIN) 300 MG capsule Take 300 mg by mouth Daily.      magnesium oxide (MAG-OX) 400 mg (241.3 mg magnesium) tablet Take 400 mg by mouth 2 (two) times daily.      psyllium 0.52 gram capsule Take 0.52 g by mouth once daily.      sodium bicarbonate 650 MG tablet Take 1,300 mg by mouth 2 (two) times daily.       Current Inpatient Medications:    Current Facility-Administered Medications:     acetaminophen 1,000 mg/100 mL (10 mg/mL) injection 1,000 mg, 1,000 mg, Intravenous, Q8H, Sunil Echavarria MD    acetaminophen tablet 1,000 mg, 1,000 mg,  Oral, Q6H PRN, Sylvie Byers FNP, 1,000 mg at 03/23/24 0952    acetaminophen tablet 650 mg, 650 mg, Oral, Q4H PRN, Syvlie Byers, GILLIANP    allopurinoL tablet 100 mg, 100 mg, Oral, Nightly, Bux, Linnette, DO, 100 mg at 03/22/24 2211    aluminum-magnesium hydroxide-simethicone 200-200-20 mg/5 mL suspension 30 mL, 30 mL, Oral, QID PRN, Sylvie Byers, FNP    Amino acid 4.25% - dextrose 5% (CLINIMIX-E) solution (1L provides 42.5 gm AA, 50 gm CHO (170 kcal/L dextrose), Na 35, K 30, Mg 5, Ca 4.5, Acetate 70, Cl 39, Phos 15), , Intravenous, Continuous, Reyes, Thairy G, DO, Last Rate: 50 mL/hr at 03/23/24 1334, New Bag at 03/23/24 1334    amiodarone in dextrose 150 mg/100 mL (1.5 mg/mL) loading dose 150 mg, 150 mg, Intravenous, Once, Reyes, Thairy G, DO    amitriptyline tablet 25 mg, 25 mg, Oral, QHS, Bux, Linnette, DO, 25 mg at 03/22/24 2210    amLODIPine tablet 2.5 mg, 2.5 mg, Oral, Daily, Bux, Linnette, DO    atorvastatin tablet 20 mg, 20 mg, Oral, QHS, Bux, Linnette, DO, 20 mg at 03/22/24 2210    cetirizine tablet 10 mg, 10 mg, Oral, Daily, Bux, Linnette, DO, 10 mg at 03/23/24 0951    dextrose 10% bolus 125 mL 125 mL, 12.5 g, Intravenous, PRN, Sylvie Byers FNP, Stopped at 03/22/24 1549    dextrose 10% bolus 250 mL 250 mL, 25 g, Intravenous, PRN, Sylvie Byers, FNP    diphenhydrAMINE injection 25 mg, 25 mg, Intravenous, Q6H PRN, Arnold Vanegas MD, 25 mg at 03/20/24 1951    enoxaparin injection 30 mg, 30 mg, Subcutaneous, Q24H (prophylaxis, 1700), NereidaxLinnette, DO, 30 mg at 03/22/24 1737    fluticasone furoate-vilanteroL 100-25 mcg/dose diskus inhaler 1 puff, 1 puff, Inhalation, Daily, Sylvie Byers FNP, 1 puff at 03/23/24 0958    furosemide tablet 40 mg, 40 mg, Oral, Daily, Linnette Cantrell, DO    gabapentin capsule 300 mg, 300 mg, Oral, Daily, Bux, Linnette, DO, 300 mg at 03/21/24 1750    glucagon (human recombinant) injection 1 mg, 1 mg, Intramuscular, PRN, Sylvie Byers FNP    glucose chewable tablet 16  g, 16 g, Oral, PRN, Sylvie Byers L, FNP    glucose chewable tablet 24 g, 24 g, Oral, PRN, Catrachito Byersy L, FNP    guaiFENesin 12 hr tablet 600 mg, 600 mg, Oral, BID, ReyesRichiery G, DO, 600 mg at 03/23/24 1045    hydrALAZINE injection 10 mg, 10 mg, Intravenous, Q4H PRN, Catrachito Byersy L, FNP    ketorolac injection 15 mg, 15 mg, Intravenous, Q6H PRN, Lanbert, Sylvie L, FNP, 15 mg at 03/23/24 0659    lactated ringers bolus 1,000 mL, 1,000 mL, Intravenous, Once, Reyes, Thairy G, DO, Last Rate: 999 mL/hr at 03/23/24 1545, 1,000 mL at 03/23/24 1545    naloxone 0.4 mg/mL injection 0.02 mg, 0.02 mg, Intravenous, PRN, Sylvie Byers L, FNP    ondansetron injection 4 mg, 4 mg, Intravenous, Q4H PRN, Deysi Wallace, AGACNP-BC, 4 mg at 03/22/24 1017    pantoprazole EC tablet 40 mg, 40 mg, Oral, Nightly, Bux, Linnette, DO, 40 mg at 03/22/24 2209    prochlorperazine injection Soln 5 mg, 5 mg, Intravenous, Q6H PRN, Catrachito Byersy L, FNP    sodium chloride 0.9% bolus 250 mL 250 mL, 250 mL, Intravenous, PRN, Peng Avalos MD, Stopped at 03/23/24 1216    sodium chloride 0.9% flush 10 mL, 10 mL, Intravenous, PRN, MolCatrachito silvestrey L, FNP    Flushing PICC/Midline Protocol, , , Until Discontinued **AND** sodium chloride 0.9% flush 10 mL, 10 mL, Intravenous, Q6H **AND** sodium chloride 0.9% flush 10 mL, 10 mL, Intravenous, PRN, ReyesRichiery G, DO    traZODone tablet 50 mg, 50 mg, Oral, QHS, Bux, Linnette, DO, 50 mg at 03/22/24 2210    Facility-Administered Medications Ordered in Other Encounters:     albumin human 25% bottle, , Intravenous, PRN, Rhona Jackson CRNA, 100 mL at 03/22/24 1227    dexAMETHasone injection, , Intravenous, PRN, Rhona Jackson CRNA, 4 mg at 03/22/24 1144    fentaNYL injection, , Intravenous, PRN, Rhona Jackson CRNA, 25 mcg at 03/22/24 1214    LIDOcaine (cardiac) injection, , Intravenous, PRN, Rhona Jackson CRNA, 60 mg at 03/22/24 1140    ondansetron injection, , Intravenous, PRN,  Rhona Jackson CRNA, 4 mg at 03/22/24 1148    PHENYLephrine injection, , Intravenous, PRN, Rhona Jackson, CRNA, 100 mcg at 03/22/24 1159    propofol (DIPRIVAN) 10 mg/mL infusion, , Intravenous, PRN, Rhona Jackson, CRNA, 100 mg at 03/22/24 1140    rocuronium injection, , Intravenous, PRN, Rhona Jackson, CRNA, 50 mg at 03/22/24 1153    sodium chloride 0.9% infusion, , Intravenous, Continuous PRN, Rhona Jackson CRNA, New Bag at 03/22/24 1135    succinylcholine injection, , Intravenous, PRN, Rhona Jackson, CRNA, 80 mg at 03/22/24 1141    sugammadex (BRIDION) 100 mg/mL injection, , Intravenous, PRN, Rhona Jackson, CRNA, 200 mg at 03/22/24 1237  VTE Risk Mitigation (From admission, onward)           Ordered     enoxaparin injection 30 mg  Every 24 hours         03/21/24 1602     IP VTE HIGH RISK PATIENT  Once         03/20/24 2150     Place sequential compression device  Until discontinued         03/20/24 2150     Reason for No Pharmacological VTE Prophylaxis  Once        Question:  Reasons:  Answer:  Risk of Bleeding    03/20/24 2150                  Assessment:   PAF with RVR - Now SR/ST    - CHADsVASc - 8 Points - 10.8% Stroke Risk per Year   Shock: Septic vs Cardiogenic in Etiology   Hypotension requiring Pressors    - Hx of HTN  Acute Hypoxemic Respiratory Failure   SBO s/p (3.22.24) - Exploratory Laparotomy with noted Dilated, Twisted Small Bowel with Scar Band released and Immediate decrease in Dilation with Observed Peristalsis  Lactic Acidosis   Neutropenia   CAD/Stents    - [facility] (2013) - L Main - Patent; LAD - Diffuse 40% Mid Vessel Stenosis with a Mild Bridge in the Mid Portion; LCX - Patent Distal Stent with a 20% Stenosis Proximally to the Stent and the 40% Stenosis Distal to the Stent; RCA - Dominant - Vessel is Patent with Lis  KRAY     - L Renal Artery Angiogram (2013) - 50% Stenosis with a 44% Stenosis by IVUS with Mean Cross-Sectional Luminal Area of 9.8. There is < 5mmHg  Gradient  COPD  MIKALA/Bilaterally Mod-Severe    - Carotid US (2022) The gray scale analysis shows a total occlusionof the right internal carotid artery; 40-59% stenosis in the proximal left internal carotid artery based on Bluth Criteria.   THUY/CPAP  GERD  Gout  Hx of COVID-19 Infection   No Hx of GIB     Plan:   Digoxin 300mcg IVP x 1 NOw  Wean Pressors for MAP > 65mmHg  Consider Inotrope for CMO/Shock   Lactic Acid Level  ECHO Stat  Will F/U Results  Labs and EKG in AM: CBC, CMP and Mg    Thank you for your consult.     Scott Lim, RACHEL  Cardiology  [facility]  03/23/2024     Agree with plan

## 2024-03-23 NOTE — NURSING
Dr Reyes is at bedside   With Response RN and Charge RN Adelaide     [facility]IS returned call and Spoke to Dr Reyes

## 2024-03-23 NOTE — PROGRESS NOTES
Acute Care Surgery   Progress Note  Admit Date: 3/20/2024  HD#3  POD#1 Day Post-Op    Subjective:   Interval history:  AF VSS  NG with billious output  Complaining of abdominal pain  Denies flatus/BM    Home Meds:  Current Outpatient Medications   Medication Instructions    allopurinoL (ZYLOPRIM) 100 mg, Oral, Nightly    amitriptyline (ELAVIL) 25 mg, Oral, Nightly    amLODIPine (NORVASC) 2.5 mg, Oral, Daily    atorvastatin (LIPITOR) 20 mg, Oral, Daily    clopidogreL (PLAVIX) 75 mg, Oral, Daily    docusate sodium (COLACE) 150 mg, Oral, Nightly    fluticasone-salmeterol diskus inhaler 250-50 mcg 1 puff, Inhalation, 2 times daily, Controller    folic acid-B vswe-R-hrnbp-zinc (DIALYVITE 3000) 3-70-15 mg-mcg-mg Tab Oral, Daily    furosemide (LASIX) 40 mg, Oral, Daily    gabapentin (NEURONTIN) 300 mg, Oral, Daily    hydrALAZINE (APRESOLINE) 50 mg, Oral, Every 8 hours    levocetirizine (XYZAL) 5 mg, Oral, Nightly    magnesium oxide (MAG-OX) 400 mg, Oral, 2 times daily    metoprolol succinate (TOPROL-XL) 50 mg, Oral, Daily    pantoprazole (PROTONIX) 40 mg, Oral, Nightly    psyllium 0.52 g, Oral, Daily    sodium bicarbonate 1,300 mg, Oral, 2 times daily    traZODone (DESYREL) 50 mg, Oral, Nightly      Scheduled Meds:   allopurinoL  100 mg Oral Nightly    amitriptyline  25 mg Oral QHS    amLODIPine  2.5 mg Oral Daily    atorvastatin  20 mg Oral QHS    cetirizine  10 mg Oral Daily    enoxparin  30 mg Subcutaneous Q24H (prophylaxis, 1700)    fluticasone furoate-vilanteroL  1 puff Inhalation Daily    furosemide  40 mg Oral Daily    gabapentin  300 mg Oral Daily    hydrALAZINE  50 mg Oral Q8H    pantoprazole  40 mg Oral Nightly    traZODone  50 mg Oral QHS     Continuous Infusions:   sodium chloride 0.9% 75 mL/hr at 03/23/24 0703     PRN Meds:acetaminophen, acetaminophen, aluminum-magnesium hydroxide-simethicone, dextrose 10%, dextrose 10%, diphenhydrAMINE, glucagon (human recombinant), glucose, glucose, hydrALAZINE,  "ketorolac, naloxone, ondansetron, prochlorperazine, sodium chloride 0.9%     Objective:     VITAL SIGNS: 24 HR MIN & MAX LAST   Temp  Min: 97.8 °F (36.6 °C)  Max: 99.5 °F (37.5 °C)  97.9 °F (36.6 °C)   BP  Min: 105/55  Max: 136/52  (!) 106/45    Pulse  Min: 87  Max: 103  93    Resp  Min: 14  Max: 23  18    SpO2  Min: 82 %  Max: 100 %  (!) 91 %      HT: 5' 1" (154.9 cm)  WT: 51.3 kg (113 lb 1.5 oz)  BMI: 21.4     Intake/output:  Intake/Output - Last 3 Shifts         03/21 0700  03/22 0659 03/22 0700 03/23 0659 03/23 0700 03/24 0659    P.O. 75      I.V. (mL/kg)  200 (3.9)     IV Piggyback  50     Total Intake(mL/kg) 75 (1.5) 250 (4.9)     Drains  550     Other  1500     Total Output  2050     Net +75 -1800            Urine Occurrence 4 x      Stool Occurrence 0 x              Intake/Output Summary (Last 24 hours) at 3/23/2024 0705  Last data filed at 3/22/2024 1837  Gross per 24 hour   Intake 250 ml   Output 2050 ml   Net -1800 ml           Lines/drains/airway:       Peripheral IV - Single Lumen 03/21/24 0000 22 G Distal;Posterior;Right Forearm (Active)   Site Assessment Clean;Dry;Intact 03/23/24 0400   Line Status Infusing 03/23/24 0400   Dressing Status Intact;Dry;Clean 03/22/24 1600   Dressing Intervention Integrity maintained 03/22/24 1600   Number of days: 2            Hemodialysis AV Fistula 07/01/21 0800 Left upper arm (Active)   Number of days: 995            NG/OG Tube 03/22/24 Left nostril (Active)   Placement Check placement verified by aspirate characteristics 03/22/24 1357   Distal Tube Length (cm) 55 03/22/24 1357   Securement secured to nostril center w/ adhesive device 03/22/24 2300   Suction Setting/Drainage Method low;intermittent setting 03/22/24 2300   Drainage Brown;Bile 03/22/24 1357   Tube Output(mL)(Include Discarded Residual) 350 mL 03/22/24 3057   Number of days: 1            Urethral Catheter 03/22/24 1157 Silicone 16 Fr. (Active)   Site Assessment Clean;Intact 03/22/24 1357   Collection " "Container Urimeter 03/22/24 1357   Securement Method secured to top of thigh w/ adhesive device 03/22/24 1357   Number of days: 0       Physical examination:  Gen: NAD, AAOx3, answering questions appropriately  HEENT: IVAN  CV: RR  Resp: NWOB  Abd: Midline incision with dressing with limited strikethrough, soft and tender to deep palpation, nondistended  Ext: moving all extremities spontaneously and purposefully  Neuro: CN II-XII grossly intact    Labs:  Renal:  Recent Labs     03/20/24  1333 03/21/24  0434 03/22/24 0425 03/23/24 0412   BUN 34.1* 38.1* 59.9* 70.8*   CREATININE 3.80* 4.11* 5.07* 5.55*     No results for input(s): "LACTIC" in the last 72 hours.  FENGI:  Recent Labs     03/20/24  1333 03/21/24 0434 03/22/24 0425 03/23/24 0412    139 142 141   K 4.6 4.6 5.5* 5.1   CO2 21* 24 20* 17*   CALCIUM 10.4* 9.4 8.6 7.9*   MG 2.70*  --  2.30 2.10   PHOS  --   --  6.1* 7.8*   ALBUMIN 4.1 3.2* 3.0* 3.0*   BILITOT 0.8 0.7  --   --    AST 28 19  --   --    ALKPHOS 92 73  --   --    ALT 18 14  --   --      Heme:  Recent Labs     03/20/24  1333 03/21/24  0434 03/22/24 0425 03/23/24 0412   HGB 14.0 11.0* 10.8* 9.2*   HCT 46.2 35.8* 35.7* 30.6*    242 245 201     ID:  Recent Labs     03/21/24 0434 03/22/24 0425 03/23/24 0412   WBC 12.96* 5.47 1.66  1.66*     CBG:  Recent Labs     03/20/24  1333 03/21/24  0434 03/22/24 0425 03/23/24 0412   GLUCOSE 142* 94 60* 67*      Cardiovascular:  No results for input(s): "TROPONINI", "CKTOTAL", "CKMB", "BNP" in the last 168 hours.  ABG:  No results for input(s): "PH", "PO2", "PCO2", "HCO3", "BE" in the last 168 hours.   I have reviewed all pertinent lab results within the past 24 hours.    Imaging:  X-Ray Abdomen AP 1 View   Final Result      Nonspecific gas pattern         Electronically signed by: Miles Morris   Date:    03/22/2024   Time:    08:25      XR Gastric tube check, non-radiologist performed   Final Result      Nasogastric tube looped over " the esophagus.  Repositioning is needed.         Electronically signed by: Rosibel John   Date:    2024   Time:    16:27      CT Abdomen Pelvis  Without Contrast   Final Result      1. Findings most suggestive of small-bowel obstruction.   2. Small volume free intraperitoneal fluid.  Small pleural effusions.         Electronically signed by: Deepti Zapata   Date:    2024   Time:    14:37         I have reviewed all pertinent imaging results/findings within the past 24 hours.    Micro/Path/Other:  Microbiology Results (last 7 days)       ** No results found for the last 168 hours. **           Pathology Results  (Last 7 days)      None             Assessment & Plan:   78 y F with history CAD status post coronary stent, renal cancer status post nephrectomy, ESRD on dialysis via left upper extremity access, hypertension, COPD, previous  and cholecystectomy, s/p ex-lap with lysis of adhesions on 3/22.     - Continue NG to LIWS  - Continue NPO, IVF  - Monitor UOP, chung not needed from surgical standpoint  - Monitoring return of bowel function    - Check prealbumin today, may require temporary parenteral nutrition      Sunil Keita MD  PGY1

## 2024-03-23 NOTE — CARE UPDATE
Received a call pt's dialysis session held 2/2 new onset AF RVR. Unstable with MAP <65. At bedside with surgical team. Pt started with IVF bolus and Tburg. No improvement in HR or BP. ICU team called and were able to start amio bolus on the floor. Pt still with no improvement in HR or BP and complaining of increased retrosternal CP radiating to back and now abdomen tender to light palpation. ABG showed  primary metabolic acidosis. Pending lactic acid and repeat labs. Pt needs CTA chest, abdomen, pelvis. Not stable for CT at this time. Transferred to ICU. I was present at bedside for all of the above along with ICU, general surgery and cardiology team.

## 2024-03-23 NOTE — NURSING
Notified Reyes MD notified that Dr Grajeda     Rounded with PT and reported PT appeared to be in afib with rvr stat EKG completed and        Afib with RVR       PT is without symptoms   No pain is reported   Pt is noted to be awake and alert     BP 70/36      O2 on 6L oxy mask 90%    Reported to Charge as well

## 2024-03-23 NOTE — NURSING
Dr Reyes returned page and reported she will come to floor to assess PT now     Charge RN Adelaide also notified

## 2024-03-23 NOTE — PROCEDURES
"Deepti Zambrano is a 78 y.o. female patient.    Temp: 98.2 °F (36.8 °C) (03/23/24 1545)  Pulse: 109 (03/23/24 1640)  Resp: (!) 24 (03/23/24 1640)  BP: (!) 77/41 (03/23/24 1600)  SpO2: 97 % (03/23/24 1640)  Weight: 51.3 kg (113 lb 1.5 oz) (03/20/24 2325)  Height: 5' 1" (154.9 cm) (03/20/24 2325)       Intubation    Date/Time: 3/23/2024 4:42 PM  Location procedure was performed: Located within [facility]    Performed by: Peng Osorio MD  Authorized by: Peng Osorio MD  Pre-operative diagnosis: Acute hypoxemic respiratory failure, AFib RVR  Post-operative diagnosis: same  Consent Done: Emergent Situation  Indications: respiratory distress, respiratory failure, airway protection and hypoxemia  Intubation method: direct  Patient status: paralyzed (RSI)  Preoxygenation: bag valve mask  Sedatives: midazolam and fentanyl (100 mcg fentanyl, 2 mg Versed)  Paralytic: rocuronium (100 mg)  Laryngoscope size: Good 2  Tube size: 7.5 mm  Tube type: cuffed  Number of attempts: 1  Ventilation between attempts: BVM  Cricoid pressure: no  Cords visualized: yes (Grade 2 view)  Post-procedure assessment: chest rise and CO2 detector (Tube fog, direct visualization)  Breath sounds: equal and clear  Cuff inflated: yes  ETT to lip: 22 cm  ETT to teeth: 21 cm  Tube secured with: ETT green  Chest x-ray interpreted by me.  Chest x-ray findings: endotracheal tube too low  Tube repositioned: tube repositioned successfully  Patient tolerance: Patient tolerated the procedure well with no immediate complications  Complications: No  Specimens: No  Implants: No  Comments: Tube repositioned at 21 cm of the bottom lip          3/23/2024    "

## 2024-03-23 NOTE — PROCEDURES
"Deepti Zambrano is a 78 y.o. female patient.    Temp: 97.6 °F (36.4 °C) (03/23/24 1108)  Pulse: 105 (03/23/24 1108)  Resp: 18 (03/23/24 0958)  BP: (!) 102/50 (03/23/24 1108)  SpO2: (!) 92 % (03/23/24 1108)  Weight: 51.3 kg (113 lb 1.5 oz) (03/20/24 2325)  Height: 5' 1" (154.9 cm) (03/20/24 2325)    PICC  Date/Time: 3/23/2024 12:40 PM  Performed by: Alicja Parada RN  Consent Done: Yes  Time out: Immediately prior to procedure a time out was called to verify the correct patient, procedure, equipment, support staff and site/side marked as required  Indications: vascular access  Anesthesia: local infiltration  Local anesthetic: lidocaine 1% without epinephrine  Anesthetic Total (mL): 5  Preparation: skin prepped with ChloraPrep  Skin prep agent dried: skin prep agent completely dried prior to procedure  Sterile barriers: all five maximum sterile barriers used - cap, mask, sterile gown, sterile gloves, and large sterile sheet  Hand hygiene: hand hygiene performed prior to central venous catheter insertion  Location details: right basilic  Catheter type: single lumen  Catheter size: 4 Fr  Catheter Length: 17cm    Ultrasound guidance: yes  Vessel Caliber: medium and patent, compressibility normal  Needle advanced into vessel with real time Ultrasound guidance.  Guidewire confirmed in vessel.  Sterile sheath used.  Number of attempts: 1  Post-procedure: blood return through all ports, sterile dressing applied and chlorhexidine patch    Complications: none  Comments: Arm circumference 24cm. Left arm dialysis access. Receiving clinimix. Ok for midline per Dr Grajeda          Name Alicja Parada RN  3/23/2024    "

## 2024-03-23 NOTE — PLAN OF CARE
Problem: Adult Inpatient Plan of Care  Goal: Plan of Care Review  Outcome: Ongoing, Progressing  Goal: Patient-Specific Goal (Individualized)  Outcome: Ongoing, Progressing  Goal: Absence of Hospital-Acquired Illness or Injury  Outcome: Ongoing, Progressing  Goal: Optimal Comfort and Wellbeing  Outcome: Ongoing, Progressing  Goal: Readiness for Transition of Care  Outcome: Ongoing, Progressing     Problem: Pain Acute  Goal: Acceptable Pain Control and Functional Ability  Outcome: Ongoing, Progressing     Problem: Infection  Goal: Absence of Infection Signs and Symptoms  Outcome: Ongoing, Progressing     Problem: Device-Related Complication Risk (Hemodialysis)  Goal: Safe, Effective Therapy Delivery  Outcome: Ongoing, Progressing     Problem: Hemodynamic Instability (Hemodialysis)  Goal: Effective Tissue Perfusion  Outcome: Ongoing, Progressing     Problem: Infection (Hemodialysis)  Goal: Absence of Infection Signs and Symptoms  Outcome: Ongoing, Progressing     Problem: Fall Injury Risk  Goal: Absence of Fall and Fall-Related Injury  Outcome: Ongoing, Progressing     Problem: Skin Injury Risk Increased  Goal: Skin Health and Integrity  Outcome: Ongoing, Progressing

## 2024-03-23 NOTE — H&P
[facility] General - 7th Floor ICU [place]  Pulmonary Critical Care Note    Patient Name: Deepti Zambrano  MRN: [id number]  Admission Date: 3/20/2024  Hospital Length of Stay: 3 days  Code Status: Full Code  Attending Provider: Linnette Cantrell DO  Primary Care Provider: Saleem Juan MD     Subjective:     HPI: 79 yo female with history of CAD s/p Stent (LCX - 2013), ESRD on HD TTHS, COPD, and HTN initially admitted to [facility] on 3/20 due to abdominal pain, nausea, and vomiting. Patient had 2 day hx of no BM with N/V. CT of the abdomen showed small bowel dilatation, indicating an SBO. NG tube placement was attempted however was unsuccessful. Patient had continued emesis and worsening abdominal pain with no BM thus decision made to take patient for Ex Lap on 03/22/2024.  Intraoperative findings revealed dilated twisted small bowel with scar band and release was performed with immediate decrease in dilatation.    Patient to have desaturations this morning with small right pleural effusion and opacities on chest x-ray.  Prior to scheduled dialysis patient went into atrial fibrillation in RVR with concomitant hypotension with maps in the 50s to 60s.  She was also complaining of substernal chest pain radiating to the back with mild abdominal pain tenderness.  Of note patient had developed a gap of 16, CO2 of 17.  Troponin obtained was 0.079, EKG with AFib RVR and concern for lateral ST depression.  ABG showed a pH of 7.28 with CO2 of 36, bicarb of 16.9 and a O2 sat of 88%.  Amiodarone bolus was given along with 1 L of fluids and ICU was called to evaluate patient.  Hypotension was refractory thus patient will be upgraded to the ICU for further management.      Hospital Course/Significant events:  3/22/2024: S/P Ex Lap and Adhesiolysis  3/23/2024: Upgraded to ICU    24 Hour Interval History:      Past Medical History:   Diagnosis Date    Acid reflux     Anesthesia complication     trouble awaking    CAD (coronary  artery disease)     CORONARY STENT    Chronic kidney disease, unspecified     COPD (chronic obstructive pulmonary disease)     COVID-19     week of Mothers Day 2023    Dialysis patient     CSI tues AND sat  chair time 0630    Epigastric pain     GERD (gastroesophageal reflux disease)     GI bleed     Gout     High cholesterol     Hx of bladder cancer     Hypertension     Kidney cancer, primary, with metastasis from kidney to other site, right     Melena     Muscle weakness of extremity     bilateral lower    Pneumonia     Post-COVID chronic cough     Sciatic nerve pain     SOB (shortness of breath) on exertion     Stroke     1997, small memory loss with  left sided weakness       Past Surgical History:   Procedure Laterality Date    AV FISTULA PLACEMENT Left     BACK SURGERY      BCG for cancer      CATARACT EXTRACTION W/  INTRAOCULAR LENS IMPLANT Bilateral     CHOLECYSTECTOMY      COLONOSCOPY      COLONOSCOPY, THROUGH STOMA, WITH HEMORRHAGE CONTROL  11/22/2023    Procedure: COLONOSCOPY, THROUGH STOMA, WITH HEMORRHAGE CONTROL;  Surgeon: German Cottrell MD;  [place] [facility];  Service: Gastroenterology;;    COLONOSCOPY, WITH POLYPECTOMY USING SNARE N/A 11/22/2023    Procedure: COLON;  Surgeon: German Cottrell MD;  [place] [facility];  Service: Gastroenterology;  Laterality: N/A;    CORONARY ANGIOPLASTY WITH STENT PLACEMENT      Dr. Strickland 18 years ago    DIAGNOSTIC LAPAROSCOPY N/A 07/28/2022    Procedure: LAPAROSCOPY, DIAGNOSTIC;  Surgeon: Edmund Echols Jr., MD;  Location: [facility] OR;  Service: General;  Laterality: N/A;    DIAGNOSTIC LAPAROSCOPY N/A 05/30/2023    Procedure: LAPAROSCOPY, DIAGNOSTIC;  Surgeon: Edmund Echols Jr., MD;  Location: [facility] OR;  Service: General;  Laterality: N/A;    ESOPHAGOGASTRODUODENOSCOPY N/A 08/25/2023    Procedure: EGD;  Surgeon: Elan Leigh MD;  [place] [facility];  Service: Gastroenterology;  Laterality: N/A;     ESOPHAGOGASTRODUODENOSCOPY N/A 11/22/2023    Procedure: DOUBLE;  Surgeon: German Cottrell MD;  Location: [facility] ENDOSCOPY;  Service: Gastroenterology;  Laterality: N/A;    FISTULOGRAM Left 2/5/2024    Procedure: Fistulogram;  Surgeon: Pradip Erwin DO;  Location: [facility] CATH LAB;  Service: Nephrology;  Laterality: Left;    HERNIA REPAIR      HYSTERECTOMY      LAPAROSCOPIC REVISION OF PROCEDURE INVOLVING PERITONEAL DIALYSIS CATHETER  05/30/2023    Procedure: REVISION OF PROCEDURE INVOLVING PERITONEAL DIALYSIS CATHETER, LAPAROSCOPIC;  Surgeon: Edmund Echols Jr., MD;  Location: [facility] OR;  Service: General;;    NECK SURGERY      cervical    PERITONEAL CATHETER INSERTION      REMOVAL, CATHETER, DIALYSIS, PERITONEAL, LAPAROSCOPIC N/A 09/28/2023    Procedure: REMOVAL, CATHETER, DIALYSIS, PERITONEAL, LAPAROSCOPIC;  Surgeon: Edmund Echols Jr., MD;  Location: [facility] OR;  Service: General;  Laterality: N/A;  LAP VENTRAL HERNIA REPAIR NOT PERFORMED DUE TO MESH IN PLACE, NOT NEEDED     right kidney removed Right     TONSILLECTOMY         Social History     Socioeconomic History    Marital status:    Tobacco Use    Smoking status: Every Day     Current packs/day: 1.00     Types: Cigarettes, Vaping with nicotine     Start date: 2022    Smokeless tobacco: Never   Substance and Sexual Activity    Alcohol use: Not Currently     Alcohol/week: 2.0 standard drinks of alcohol     Types: 1 Glasses of wine, 1 Cans of beer per week     Comment: occasionally    Drug use: Never    Sexual activity: Not Currently     Social Determinants of Health     Financial Resource Strain: Low Risk  (3/22/2024)    Overall Financial Resource Strain (CARDIA)     Difficulty of Paying Living Expenses: Not very hard   Recent Concern: Financial Resource Strain - Medium Risk (3/20/2024)    Overall Financial Resource Strain (CARDIA)     Difficulty of Paying Living Expenses: Somewhat hard   Food Insecurity: No Food Insecurity (3/22/2024)     Hunger Vital Sign     Worried About Running Out of Food in the Last Year: Never true     Ran Out of Food in the Last Year: Never true   Transportation Needs: No Transportation Needs (3/22/2024)    PRAPARE - Transportation     Lack of Transportation (Medical): No     Lack of Transportation (Non-Medical): No   Stress: No Stress Concern Present (3/22/2024)    [facility] [employer] [facility] - Occupational Stress Questionnaire     Feeling of Stress : Only a little   Recent Concern: Stress - Stress Concern Present (3/20/2024)    [facility] - Occupational Stress Questionnaire     Feeling of Stress : Very much   Housing Stability: Low Risk  (3/22/2024)    Housing Stability Vital Sign     Unable to Pay for Housing in the Last Year: No     Number of Places Lived in the Last Year: 1     Unstable Housing in the Last Year: No           Objective:     Current Outpatient Medications   Medication Instructions    allopurinoL (ZYLOPRIM) 100 mg, Oral, Nightly    amitriptyline (ELAVIL) 25 mg, Oral, Nightly    amLODIPine (NORVASC) 2.5 mg, Oral, Daily    atorvastatin (LIPITOR) 20 mg, Oral, Daily    clopidogreL (PLAVIX) 75 mg, Oral, Daily    docusate sodium (COLACE) 150 mg, Oral, Nightly    fluticasone-salmeterol diskus inhaler 250-50 mcg 1 puff, Inhalation, 2 times daily, Controller    folic acid-B efox-I-tojdz-zinc (DIALYVITE 3000) 3-70-15 mg-mcg-mg Tab Oral, Daily    furosemide (LASIX) 40 mg, Oral, Daily    gabapentin (NEURONTIN) 300 mg, Oral, Daily    hydrALAZINE (APRESOLINE) 50 mg, Oral, Every 8 hours    levocetirizine (XYZAL) 5 mg, Oral, Nightly    magnesium oxide (MAG-OX) 400 mg, Oral, 2 times daily    metoprolol succinate (TOPROL-XL) 50 mg, Oral, Daily    pantoprazole (PROTONIX) 40 mg, Oral, Nightly    psyllium 0.52 g, Oral, Daily    sodium bicarbonate 1,300 mg, Oral, 2 times daily    traZODone (DESYREL) 50 mg, Oral, Nightly       Current Inpatient Medications   acetaminophen  1,000 mg  Intravenous Q8H    allopurinoL  100 mg Oral Nightly    amiodarone in dextrose  150 mg Intravenous Once    amitriptyline  25 mg Oral QHS    amLODIPine  2.5 mg Oral Daily    atorvastatin  20 mg Oral QHS    cetirizine  10 mg Oral Daily    digoxin  300 mcg Intravenous Once    enoxparin  30 mg Subcutaneous Q24H (prophylaxis, 1700)    fluticasone furoate-vilanteroL  1 puff Inhalation Daily    furosemide  40 mg Oral Daily    gabapentin  300 mg Oral Daily    guaiFENesin  600 mg Oral BID    lactated ringers  1,000 mL Intravenous Once    pantoprazole  40 mg Oral Nightly    sodium chloride 0.9%  10 mL Intravenous Q6H    traZODone  50 mg Oral QHS           Intake/Output Summary (Last 24 hours) at 3/23/2024 1526  Last data filed at 3/23/2024 1213  Gross per 24 hour   Intake 900 ml   Output 1400 ml   Net -500 ml       Review of Systems   Constitutional:  Negative for fever.   Respiratory:  Positive for shortness of breath.    Cardiovascular:  Positive for chest pain and palpitations.   Neurological:  Positive for dizziness and headaches. Negative for loss of consciousness.        Vital Signs (Most Recent):  Temp: 98.7 °F (37.1 °C) (03/23/24 1320)  Pulse: (!) 156 (03/23/24 1320)  Resp: 18 (03/23/24 0958)  BP: (!) 70/36 (03/23/24 1320)  SpO2: (!) 88 % (03/23/24 1320)  Body mass index is 21.37 kg/m².  Weight: 51.3 kg (113 lb 1.5 oz) Vital Signs (24h Range):  Temp:  [97.4 °F (36.3 °C)-99.5 °F (37.5 °C)] 98.7 °F (37.1 °C)  Pulse:  [] 156  Resp:  [16-18] 18  SpO2:  [88 %-100 %] 88 %  BP: ()/(36-63) 70/36     Physical Exam  Constitutional:       General: She is in acute distress.      Appearance: She is ill-appearing.   HENT:      Head: Normocephalic and atraumatic.   Eyes:      Extraocular Movements: Extraocular movements intact.      Conjunctiva/sclera: Conjunctivae normal.      Pupils: Pupils are equal, round, and reactive to light.   Cardiovascular:      Rate and Rhythm: Tachycardia present. Rhythm irregular.      Heart  sounds: Murmur heard.   Pulmonary:      Effort: Respiratory distress present.      Breath sounds: Normal breath sounds.   Abdominal:      General: Abdomen is flat. There is no distension.      Tenderness: There is abdominal tenderness.   Musculoskeletal:         General: Normal range of motion.      Cervical back: Normal range of motion.      Right lower leg: Edema (+1-2) present.      Left lower leg: Edema (+1-2) present.   Skin:     Capillary Refill: Capillary refill takes less than 2 seconds.      Comments: Cool and Clammy Extremities           Mechanical ventilation support:       Lines/Drains/Airways       Drain  Duration                  NG/OG Tube 03/22/24 Left nostril 1 day         Urethral Catheter 03/22/24 1157 Silicone 16 Fr. 1 day              Peripheral Intravenous Line  Duration                  Hemodialysis AV Fistula 07/01/21 0800 Left upper arm 996 days         Peripheral IV - Single Lumen 03/21/24 0000 22 G Distal;Posterior;Right Forearm 2 days         Midline Catheter - Single Lumen 03/23/24 1240 Right basilic vein (medial side of arm) <1 day                    Significant Labs:    Lab Results   Component Value Date    WBC 1.66 (LL) 03/23/2024    WBC 1.66 03/23/2024    HGB 9.2 (L) 03/23/2024    HCT 30.6 (L) 03/23/2024    .6 (H) 03/23/2024     03/23/2024         BMP  Lab Results   Component Value Date     03/23/2024    K 5.1 03/23/2024    CO2 17 (L) 03/23/2024    BUN 70.8 (H) 03/23/2024    CREATININE 5.55 (H) 03/23/2024    CALCIUM 7.9 (L) 03/23/2024    EGFRNONAA 10 06/17/2022       ABG  Recent Labs   Lab 03/23/24  1502   PH 7.280*   PO2 59.0*   PCO2 36.0   HCO3 16.9*           Significant Imaging:  Pending CT Chest, Abdomen, and Pelvis        Assessment/Plan:     Assessment  Shock etiology to be determined possibly multifactorial (Cardiogenic/ Tachymediated CMO, possible Sepsis, Bowel Ischemia)  New Onset Afib RVR now   Acute Respiratory Failure 2/2 to Above  SBO 2/2 to  Adhesions s/p Ex Lap  HTN  Leukopenia  Hx of COPD  Hx of RCC, Bladder Cancer  Hx of CVA with residual left sided weakness      Plan  - Admit to ICU  - Pending CT Chest Abdomen and Pelvis with Contrast  - Patient intubated and sedated now on MV, wean as tolerated  - Given Amio bolus and Digoxin 300mcg which converted to NSR   - Started on Levophed  - Etiology of Leukopenia undetermined, peripheral smear with absolute neutropenia, lymphopenia, and monocytopenia  - Pending Echo  - Pan-Cultured with BC x2, respiratory panel, respiratory culture (pending)   - wean vasopressors as tolerated to a map of 65  - nephrology following for HD needs  - general surgery following   - cardiology following appreciate recommendations  - Pending Lactic Acid    DVT Prophylaxis:  Heparin GTT (pending CT findings)  GI Prophylaxis:  Protonix     Patient is at significant risk for death or decompensation and will remain in the ICU.    34 minutes of critical care was time spent personally by me on the following activities: development of treatment plan with patient or surrogate and bedside caregivers, discussions with consultants, evaluation of patient's response to treatment, examination of patient, ordering and performing treatments and interventions, ordering and review of laboratory studies, ordering and review of radiographic studies, pulse oximetry, re-evaluation of patient's condition.  This critical care time did not overlap with that of any other provider or involve time for any procedures.      Marco A Carcamo MD  Pulmonary Critical Care Medicine  [facility] - 7th Floor [facility]

## 2024-03-23 NOTE — PROGRESS NOTES
Progress Note  Nephrology    Admit Date: 3/20/2024   LOS: 3 days     SUBJECTIVE:     Follow-up For:  ESRD    Interval History:  77 Y/O female with history of ESRD , COPD , HTN , Carotid artery disease admitted to hospital for abdominal pain  NG placement was unsuccessful  Underwent Exploratory laparotomy with lysis of adhisions   Denies any complaint  had hemodialysis done with no f;uid removal   States she still has avbdominal pain but no other complaint   Started on Antibiotics today     Review of Systems:  Constitutional: No fever or chills  Respiratory: No cough or shortness of breath  Cardiovascular: No chest pain or palpitations  Gastrointestinal: No nausea or vomiting  Neurological: No confusion or weakness    OBJECTIVE:     Vital Signs Range (Last 24H):  Vitals:    03/23/24 1108   BP: (!) 102/50   Pulse: 105   Resp:    Temp: 97.6 °F (36.4 °C)       Temp:  [97.4 °F (36.3 °C)-99.5 °F (37.5 °C)]   Pulse:  []   Resp:  [14-23]   BP: (100-133)/(44-63)   SpO2:  [90 %-100 %]     I & O (Last 24H):  Intake/Output Summary (Last 24 hours) at 3/23/2024 1327  Last data filed at 3/23/2024 1213  Gross per 24 hour   Intake 900 ml   Output 1600 ml   Net -700 ml       Physical Exam:  Alert , oriented , in NAD  Seems to be dry today   Head   normal   Neck   supple   Chest   symmetric   Lungs  clear    Heart  fast and irregular   A Fib with RVR  Abdomen   soft ,tender due to surgery  Ext   no edema     Laboratory Data:    Recent Labs   Lab 03/23/24  0412      K 5.1   CO2 17*   BUN 70.8*   CREATININE 5.55*   GLUCOSE 67*   CALCIUM 7.9*   PHOS 7.8*     Lab Results   Component Value Date    .4 (H) 03/22/2024    CALCIUM 7.9 (L) 03/23/2024    PHOS 7.8 (H) 03/23/2024     Lab Results   Component Value Date    IRON 34 (L) 10/19/2021    TIBC 166 (L) 10/19/2021    FERRITIN 157.19 10/19/2021       Medications:  Medication list was reviewed and changes noted under Assessment/Plan.    Diagnostic Results:    Reviewed most  recent CT/US/Echo/MRI    ASSESSMENT/PLAN:   1   ESRD , T/s  2   SBO , S/P exploratory laparatomy   3    HTN  4   Anemia  5   CAD , / stent previously  6   A Fib with RVR now  7   Carotid artery disease   8   CVA  H/O  10  Right nephrectomy , CA  11  Bladder CA      Plan   telemetry now             Labs in AM

## 2024-03-23 NOTE — PROGRESS NOTES
[facility] Hospital Medicine Progress Note      Chief Complaint: Inpatient Follow-up for SBO    HPI:   78 yr old female whose history includes CAD, COPD, ESRD on HD, and HTN. Presented to ED with c/o a one day history of abdominal pain, nausea and vomiting. Last bowel movement was 2 days ago and described as smaller and harder. Took stool softeners thinking she was constipated with no relief of symptoms. She's had multiple previous abdominal surgeries. Denies passing any flatus. Does outpatient hemodialysis on Tuesday and Saturday. Last HD was 3/19.      VS on arrival: T 97.3, P 92, R 18, B/P 158/64, Sats 95% on room air. Initial labs: wBC 13.2, Hgb 14, bicarb 21, CHRISTOPHER 34, Hct 3.8 and otherwise unremarkable. CT abdomen/pelvis without contrast showed findings of SBO, small volume free intraperitoneal fluid and small pleural effusions. Multiple attempts of NG tube placement without success.     Interval Hx:   Patient seen and examined by bedside this morning.  NG tube in place. Complaining of cough and desaturated today requiring oxymask. Case discussed with nursing and Dr. Howell    Objective/physical exam:  General: In no acute distress, afebrile  Chest: Clear to auscultation bilaterally  Heart: RRR, +S1, S2, no appreciable murmur  Abdomen: NG in place, midline surgical incision w/bleeding seen thru bandaging, soft, NTND  MSK: Warm, no lower extremity edema, no clubbing or cyanosis  Neurologic: Alert and oriented x4, Cranial nerve II-XII intact, Strength 5/5 in all 4 extremities    VITAL SIGNS: 24 HRS MIN & MAX LAST   Temp  Min: 97.4 °F (36.3 °C)  Max: 99.5 °F (37.5 °C) 97.4 °F (36.3 °C)   BP  Min: 100/46  Max: 133/51 (!) 100/46   Pulse  Min: 87  Max: 103  103   Resp  Min: 14  Max: 23 18   SpO2  Min: 84 %  Max: 100 % (!) 84 %     I have reviewed the following labs:  Recent Labs   Lab 03/21/24  0434 03/22/24  0425 03/23/24  0412   WBC 12.96* 5.47 1.66  1.66*   RBC 3.37* 3.34* 2.87*   HGB  11.0* 10.8* 9.2*   HCT 35.8* 35.7* 30.6*   .2* 106.9* 106.6*   MCH 32.6* 32.3* 32.1*   MCHC 30.7* 30.3* 30.1*   RDW 18.9* 18.6* 18.6*    245 201   MPV 10.0 9.9 9.9     Recent Labs   Lab 03/20/24  1333 03/21/24  0434 03/22/24  0425 03/23/24  0412    139 142 141   K 4.6 4.6 5.5* 5.1   CO2 21* 24 20* 17*   BUN 34.1* 38.1* 59.9* 70.8*   CREATININE 3.80* 4.11* 5.07* 5.55*   CALCIUM 10.4* 9.4 8.6 7.9*   MG 2.70*  --  2.30 2.10   ALBUMIN 4.1 3.2* 3.0* 3.0*   ALKPHOS 92 73  --   --    ALT 18 14  --   --    AST 28 19  --   --    BILITOT 0.8 0.7  --   --      Microbiology Results (last 7 days)       Procedure Component Value Units Date/Time    Respiratory Culture [[id number]]     Order Status: Sent Specimen: Sputum              See below for Radiology    Scheduled Med:   sodium chloride 0.9%   Intravenous Once    acetaminophen  1,000 mg Intravenous Q8H    allopurinoL  100 mg Oral Nightly    amitriptyline  25 mg Oral QHS    amLODIPine  2.5 mg Oral Daily    atorvastatin  20 mg Oral QHS    cetirizine  10 mg Oral Daily    enoxparin  30 mg Subcutaneous Q24H (prophylaxis, 1700)    fluticasone furoate-vilanteroL  1 puff Inhalation Daily    furosemide  40 mg Oral Daily    gabapentin  300 mg Oral Daily    guaiFENesin  600 mg Oral BID    hydrALAZINE  50 mg Oral Q8H    pantoprazole  40 mg Oral Nightly    traZODone  50 mg Oral QHS      Continuous Infusions:   sodium chloride 0.9% 75 mL/hr at 03/23/24 0703      PRN Meds:  acetaminophen, acetaminophen, aluminum-magnesium hydroxide-simethicone, dextrose 10%, dextrose 10%, diphenhydrAMINE, glucagon (human recombinant), glucose, glucose, hydrALAZINE, ketorolac, naloxone, ondansetron, prochlorperazine, sodium chloride 0.9%, sodium chloride 0.9%     Assessment/Plan:  Small-bowel obstruction  ESRD on HD   Hypertension   Leukopenia, mild neutropenia   Macrocytic anemia  Acute hypoxemic resp failure   Protein alma delia malnutrition    History of: COPD, RCC, bladder cancer, and GERD,  gout, CVA with residual left-sided weakness    NG currently in place with bilious green drainage in AYLA dean  Ok for meds via NG, need to hold suction for 1 hour after meds   No bowel function seen, vomiting resolved. Continue NPO per surgery  Clinimix started 3/23, low pre albumin 3/23  Remove chung 3/23  S/p ex lap 3/22 with Dr. Howell which found twisted bowel s/p scar band release  Nephrology follow for HD Tues/Saturdays  Leukopenia, mild neutropenia-repeat CBC in AM and follow peripheral smear ordered 3/23  Pt desaturated 3/23, cxr showed small R pleural effusion and bibasilar opacities--> viral panel, sputum culture pending  IS, acapella, mucinex, tessalon, decrease rate of fluids (run clinimix at 50)  Afebrile, no indication for abx at this time, can start if clinical condition change       VTE prophylaxis:  Lovenox, renally dosed     Patient condition:  Fair    Anticipated discharge and Disposition:   Possibly home      All diagnosis and differential diagnosis have been reviewed; assessment and plan has been documented; I have personally reviewed the labs and test results that are presently available; I have reviewed the patients medication list; I have reviewed the consulting providers response and recommendations. I have reviewed or attempted to review medical records based upon their availability    All of the patient's questions have been  addressed and answered. Patient's is agreeable to the above stated plan. I will continue to monitor closely and make adjustments to medical management as needed.  _____________________________________________________________________    Nutrition Status:    Radiology:  I have personally reviewed the following imaging and agree with the radiologist.     X-Ray Chest 1 View  Narrative: EXAMINATION:  XR CHEST 1 VIEW    CLINICAL HISTORY:  hypoxemia;    COMPARISON:  Chest x-ray dated 05/23/2023, CT abdomen pelvis dated 03/20/2024    FINDINGS:  The enteric tube courses  below the diaphragm.  The heart is stable in size.  There is a small right pleural effusion with basilar airspace opacity.  There is no definite visible pneumothorax.  Impression: Small right pleural effusion with basilar airspace opacity    Electronically signed by: Rosibel John  Date:    03/23/2024  Time:    09:10      Thairy Reyes, DO  Department of Hospital Medicine  [facility]  03/23/2024

## 2024-03-23 NOTE — NURSING
At 1433 Unofficial RRT called by pt's nurse on 8 central, pt's MD at bedside requesting to transfer pt to ICU. Pt in  afib, hypotensive and low spo2 sats. Currently receiving an LR bolus of 1 liter. ICU residents notified and they arrived shortly at the pt's bedside to assess, after several minutes of discussion an amiodarone bolus given by myself. CIS consulted and also arrived at bedside. Bp has not responded to fluid challenge and  Decision was made around around 1530 to transport pt to ICU.

## 2024-03-24 NOTE — DISCHARGE SUMMARY
Discharge Summary  Critical Care Medicine      Admit Date: 3/20/2024    Discharge Date and Time:3/24/2024      Discharge Attending Physician: Peng Osorio MD     Diagnoses:  There are no hospital problems to display for this patient.      Discharged Condition:     Hospital Course:     HPI: 79 yo female with history of CAD s/p Stent (LCX - ), ESRD on HD TTHS, COPD, and HTN initially admitted to [facility] on 3/20 due to abdominal pain, nausea, and vomiting. Patient had 2 day hx of no BM with N/V. CT of the abdomen showed small bowel dilatation, indicating an SBO. NG tube placement was attempted however was unsuccessful. Patient had continued emesis and worsening abdominal pain with no BM thus decision made to take patient for Ex Lap on 2024.  Intraoperative findings revealed dilated twisted small bowel with scar band and release was performed with immediate decrease in dilatation.     Patient to have desaturations this morning with small right pleural effusion and opacities on chest x-ray.  Prior to scheduled dialysis patient went into atrial fibrillation in RVR with concomitant hypotension with maps in the 50s to 60s.  She was also complaining of substernal chest pain radiating to the back with mild abdominal pain tenderness.  Of note patient had developed a gap of 16, CO2 of 17.  Troponin obtained was 0.079, EKG with AFib RVR and concern for lateral ST depression.  ABG showed a pH of 7.28 with CO2 of 36, bicarb of 16.9 and a O2 sat of 88%.  Amiodarone bolus was given along with 1 L of fluids and ICU was called to evaluate patient.  Hypotension was refractory thus patient will be upgraded to the ICU for further management.    Hospital Course/Significant events:  3/22/2024: S/P Ex Lap and Adhesiolysis  3/23/2024: Upgraded to ICU  3/24/2024: Code status change from Full to DNR. 2/2 blood cultures growing gram negative rods. Asystole @ approximately 5:08 AM 3/24/24, pepito (2) at bedside.    Consults:  Cardiology and General Surgery    Special Treatments/Procedures: none and surgery: ex-lap and adhesiolysis    Disposition:     Patient Instructions:   Current Discharge Medication List        CONTINUE these medications which have NOT CHANGED    Details   amitriptyline (ELAVIL) 25 MG tablet Take 25 mg by mouth every evening.      amLODIPine (NORVASC) 2.5 MG tablet Take 2.5 mg by mouth once daily.      atorvastatin (LIPITOR) 20 MG tablet Take 20 mg by mouth once daily.      clopidogreL (PLAVIX) 75 mg tablet Take 75 mg by mouth once daily.      furosemide (LASIX) 80 MG tablet Take 40 mg by mouth once daily.      hydrALAZINE (APRESOLINE) 50 MG tablet Take 50 mg by mouth every 8 (eight) hours.      levocetirizine (XYZAL) 5 MG tablet Take 5 mg by mouth every evening.      metoprolol succinate (TOPROL-XL) 50 MG 24 hr tablet Take 50 mg by mouth once daily.      pantoprazole (PROTONIX) 40 MG tablet Take 40 mg by mouth nightly.      traZODone (DESYREL) 50 MG tablet Take 50 mg by mouth every evening.      allopurinoL (ZYLOPRIM) 100 MG tablet Take 100 mg by mouth nightly.      docusate sodium (COLACE) 50 MG capsule Take 150 mg by mouth every evening.      fluticasone-salmeterol diskus inhaler 250-50 mcg Inhale 1 puff into the lungs 2 (two) times daily. Controller      folic acid-B rmwg-Y-tqthz-zinc (DIALYVITE 3000) 3-70-15 mg-mcg-mg Tab Take by mouth once daily.      gabapentin (NEURONTIN) 300 MG capsule Take 300 mg by mouth Daily.      magnesium oxide (MAG-OX) 400 mg (241.3 mg magnesium) tablet Take 400 mg by mouth 2 (two) times daily.      psyllium 0.52 gram capsule Take 0.52 g by mouth once daily.      sodium bicarbonate 650 MG tablet Take 1,300 mg by mouth 2 (two) times daily.             No discharge procedures on file.      Received a page from the nurse that Ms. Deepti Zambrano's telemetry monitor was showing asystole. On arrival at bedside, she was unresponsive to verbal, tactile or painful stimuli. She   was not moving any of her extremities spontaneously. She had non-palpable bilateral  Carotid, Radial, and Femoral pulses. On auscultation of her precordium and anterior thorax, she did not have any audible heart sounds or breath sounds. She had absent pupillary light reflexes as well as absent corneal reflexes bilaterally. She was pronounced  at 5:08 AM on 3/24/2024. Cause of death was cardiopulmonary arrest from septic shock, multi system organ failure.    Family was at bedside and informed of the patient's passing.     Time of Death: 5:08 AM  Date of Death: 2024    Attending physician notified.    Guillermo Mcleod MD  Internal Medicine - PGY-2

## 2024-03-24 NOTE — PROGRESS NOTES
Pharmacokinetic Initial Assessment: IV Vancomycin    Assessment/Plan:    Initiate intravenous vancomycin with loading dose of 1250 mg once with subsequent doses when random concentrations are less than 20 mcg/mL  Desired empiric serum trough concentration is 15 to 20 mcg/mL  Draw vancomycin random level on 03/25 at 0430.  Pharmacy will continue to follow and monitor vancomycin.      Please contact pharmacy at extension [phone] with any questions regarding this assessment.     Thank you for the consult,   Benny Zee       Patient brief summary:  Deepti Zambrano is a 78 y.o. female initiated on antimicrobial therapy with IV Vancomycin for treatment of suspected bacteremia    Drug Allergies:   Review of patient's allergies indicates:   Allergen Reactions    Penicillins Itching and Hallucinations    Ciprofloxacin Itching    Codeine Itching       Actual Body Weight:   51.3kg    Renal Function:   Estimated Creatinine Clearance: 6.6 mL/min (A) (based on SCr of 5.38 mg/dL (H)).,     Dialysis Method (if applicable):  intermittent HD    CBC (last 72 hours):  Recent Labs   Lab Result Units 03/21/24 0434 03/22/24 0425 03/23/24 0412 03/23/24 1621 03/24/24  0128   WBC x10(3)/mcL 12.96* 5.47 1.66  1.66* 1.68* 1.37*   Hgb g/dL 11.0* 10.8* 9.2* 9.0* 9.8*   Hct % 35.8* 35.7* 30.6* 30.4* 33.0*   Platelet x10(3)/mcL 242 245 201 186 198   Mono % % 6.4 11.9  --   --   --    Monocytes % %  --   --  4 1  --    Eos % % 1.5 1.1  --   --   --    Eosinophils % %  --   --  1 6  --    Basophil % % 0.5 0.9  --   --   --        Metabolic Panel (last 72 hours):  Recent Labs   Lab Result Units 03/21/24 0434 03/22/24 0425 03/23/24 0412 03/23/24 1621 03/24/24  0128   Sodium Level mmol/L 139 142 141 141 137   Potassium Level mmol/L 4.6 5.5* 5.1 4.5 6.0*   Chloride mmol/L 103 104 108* 109* 107   Carbon Dioxide mmol/L 24 20* 17* 16* 13*   Glucose Level mg/dL 94 60* 67* 70* 63*   Blood Urea Nitrogen mg/dL 38.1* 59.9* 70.8* 75.3* 71.4*  "  Creatinine mg/dL 4.11* 5.07* 5.55* 5.37* 5.38*   Albumin Level g/dL 3.2* 3.0* 3.0* 2.5* 2.3*   Bilirubin Total mg/dL 0.7  --   --  0.8 0.8   Alkaline Phosphatase unit/L 73  --   --  52 62   Aspartate Aminotransferase unit/L 19  --   --  46* 60*   Alanine Aminotransferase unit/L 14  --   --  30 42   Magnesium Level mg/dL  --  2.30 2.10 1.80 1.90   Phosphorus Level mg/dL  --  6.1* 7.8* 7.5* 8.6*       Drug levels (last 3 results):  No results for input(s): "VANCOMYCINRA", "VANCORANDOM", "VANCOMYCINPE", "VANCOPEAK", "VANCOMYCINTR", "VANCOTROUGH" in the last 72 hours.    Microbiologic Results:  Microbiology Results (last 7 days)       Procedure Component Value Units Date/Time    Blood Culture [[id number]]  (Abnormal) Collected: 03/23/24 1621    Order Status: Completed Specimen: Blood Updated: 03/24/24 0251     GRAM STAIN Gram Negative Rods      Seen in gram stain of broth only      1 of 2 Aerobic bottles positive    BCID2 Panel [[id number]] Collected: 03/23/24 1621    Order Status: Sent Specimen: Blood Updated: 03/24/24 0251    Blood Culture [[id number]]  (Abnormal) Collected: 03/23/24 1621    Order Status: Completed Specimen: Blood Updated: 03/24/24 0250     GRAM STAIN Gram Negative Rods      Seen in gram stain of broth only      1 of 2 Aerobic bottles positive    Respiratory Culture [[id number]]     Order Status: Sent Specimen: Sputum             "

## 2024-03-25 NOTE — ANESTHESIA POSTPROCEDURE EVALUATION
Anesthesia Post Evaluation    Patient: Deepti Zambrano    Procedure(s) Performed: Procedure(s) (LRB):  LAPAROTOMY, EXPLORATORY (N/A)    Final Anesthesia Type: general      Patient location during evaluation: PACU  Patient participation: Yes- Able to Participate  Level of consciousness: awake  Post-procedure vital signs: reviewed and stable  Pain management: adequate  Airway patency: patent  THUY mitigation strategies: Postoperative administration of CPAP, nasopharyngeal airway, or oral appliance in the postanesthesia care unit (PACU)  PONV status at discharge: No PONV  Anesthetic complications: no      Cardiovascular status: hemodynamically stable  Respiratory status: unassisted and spontaneous ventilation  Hydration status: euvolemic  Follow-up not needed.              Vitals Value Taken Time   BP 59/42 03/23/24 2146   Temp 36.8 °C (98.2 °F) 03/23/24 1545   Pulse 0 03/24/24 0526   Resp 30 03/24/24 0526   SpO2 73 % 03/24/24 0240   Vitals shown include unvalidated device data.      Event Time   Out of Recovery 03/22/2024 14:56:40         Pain/Caroline Score: No data recorded

## 2024-03-26 LAB
BACTERIA BLD CULT: ABNORMAL
BACTERIA BLD CULT: ABNORMAL
GRAM STN SPEC: ABNORMAL

## 2024-04-16 NOTE — OP NOTE
PATIENT:  Deepti Zambrano      : 1945       DATE OF SURGERY:   3/22/2024          SURGEON:  Edmund Echols MD       ASSISTANT:  Sunil Echavarria MD (Resident)          PREOPERATIVE DIAGNOSIS:  Small Bowel Obstruction           POSTOPERATIVE DIAGNOSIS:  Same.           OPERATIONS:   1.  Exploratory Lapartomy     2.  Lysis of Critical Adhesion               Anesthesia:  General endotracheal anesthesia.      Estimated blood loss:  Less than 50 cc.      Blood administered:  None.      Lap and instrument counts correct x 2 at the end of the case.             INDICATIONS/SIGNIFICANT HISTORY:  The patient is a 78 y.o. year old female who was seen with complaints of abdominal pain with small bowel obstruction.  Pt states the abdominal has slowly worsened now with emesis.  Risks and Benefits of surgery was discussed with the patient, who voiced understanding of risks and benefits and elected to proceed with surgery.           PROCEDURE IN DETAIL:  Once informed consents were obtained, the patient was taken to the operating room and placed supine on the operating table.  After general endotracheal anesthesia was induced, the abdomen was prepped and draped in a standard surgical fashion.  The abdomen was opened in the usual fashion with a midline incision and a self retaining retractor was placed.  The small bowel appeared distended with significant adhesions in the lower quadrant.  The bowel was critically adhered to a band with no obvious signs of ischemia.  Upon releasing the critical adhesion, all the bowel appeared viable and peristalsis was observed. The rest of the bowel was run and no other signs of pathology was appreciated.  The abdomen was then copiously irrigated and dried.  The abdomen was closed with a 1-0 Loop PDS suture in a running fashion.  The skin was cleaned, dried then closed with staples. The patient tolerated the procedure well and was transported to recovery room in good condition.

## (undated) DEVICE — GLOVE PROTEXIS LTX MICRO 6.5

## (undated) DEVICE — SOL IRRI STRL WATER 1000ML

## (undated) DEVICE — TROCAR ENDOPATH XCEL 11MM 10CM

## (undated) DEVICE — TRAY CATH FOL SIL URIMTR 16FR

## (undated) DEVICE — Device

## (undated) DEVICE — SUT VICRYL PLUS 4-0 FS-2 27IN

## (undated) DEVICE — SOL NORMAL USPCA 0.9%

## (undated) DEVICE — GAUZE SPONGE 4X4 12PLY

## (undated) DEVICE — SYR 3ML LL 18GA 1.5IN

## (undated) DEVICE — KIT CANIST SUCTION 1200CC

## (undated) DEVICE — ADHESIVE MASTISOL VIAL 48/BX

## (undated) DEVICE — SOL NACL IRR 1000ML BTL

## (undated) DEVICE — NDL INSUF ULTRA VERESS 120MM

## (undated) DEVICE — TROCAR ENDOPATH XCEL 5X100MM

## (undated) DEVICE — KIT SURGICAL COLON .25 1.1OZ

## (undated) DEVICE — NDL GRANEE OPEN LOOP GRASPER

## (undated) DEVICE — HOLDER STRIP-T SELF ADH 2X10IN

## (undated) DEVICE — BAG MEDI-PLAST DECANTER C-FLOW

## (undated) DEVICE — SUT PDS PLUS 1 TP1 96IN

## (undated) DEVICE — DRAPE FLUID WARMER 44X44IN

## (undated) DEVICE — TIP SUCTION YANKAUER

## (undated) DEVICE — DEVICE ENSEAL X1 LARGE JAW

## (undated) DEVICE — FLOWSWITCH HP 1-W W/O LL

## (undated) DEVICE — SHEARS HARMONIC CRVD TIP 36CM

## (undated) DEVICE — CLOSURE SKIN STERI STRIP 1/2X4

## (undated) DEVICE — DRAPE UTILITY W/ TAPE 20X30IN

## (undated) DEVICE — PROBE CIRCLER FIRE APC

## (undated) DEVICE — WARMER DRAPE STERILE LF

## (undated) DEVICE — CANNULA ENDOPATH XCEL 5X100MM

## (undated) DEVICE — ADAPTER DUAL NSL LUER M-M 7FT

## (undated) DEVICE — KIT GEN LAPAROSCOPY LAFAYETTE

## (undated) DEVICE — GOWN X-LG STERILE BACK

## (undated) DEVICE — IRRIGATOR HYDRO-SURG PLUS 5MM

## (undated) DEVICE — GLOVE PROTEXIS LTX MICRO  7.5

## (undated) DEVICE — KIT SURGICAL TURNOVER

## (undated) DEVICE — TUBING O2 FEMALE CONN 13FT

## (undated) DEVICE — ELECTRODE REM PLYHSV RETURN 9

## (undated) DEVICE — NDL HYPO REG 25G X 1 1/2

## (undated) DEVICE — SKINMARKER & RULER REGULAR X-F

## (undated) DEVICE — UNDERPAD DISPOSABLE 30X30IN

## (undated) DEVICE — SUT VICRYL 3-0 27 SH

## (undated) DEVICE — TOWEL OR WHT XRAY 16X26 2/PK

## (undated) DEVICE — CANNULA NASAL ADLT EAR 25FT

## (undated) DEVICE — BINDER ABDOM 4PANEL 12IN SM/MD

## (undated) DEVICE — ELECTRODE PATIENT RETURN DISP

## (undated) DEVICE — CHLORAPREP W TINT 26ML APPL

## (undated) DEVICE — COLLECTION SPECIMEN NEPTUNE

## (undated) DEVICE — SYS HYDRO-SURG IRR SMOKE EVAC

## (undated) DEVICE — COVER TABLE HVY DTY 60X90IN

## (undated) DEVICE — SUT SILK 3-0 SH DETACH 30IN

## (undated) DEVICE — SUT VICRYL+ 27 UR-6 VIOL

## (undated) DEVICE — GLOVE PROTEXIS PI SYN SURG 6.0

## (undated) DEVICE — GLOVE PROTEXIS BLUE LATEX 6.5

## (undated) DEVICE — GLOVE PROTEXIS PI SYN SURG 7

## (undated) DEVICE — COVER PROBE US 5.5X58L NON LTX

## (undated) DEVICE — BLADE SURG STAINLESS STEEL #11

## (undated) DEVICE — NDL SYR 10ML 18X1.5 LL BLUNT

## (undated) DEVICE — GLOVE PROTEXIS PI SYN SURG 6.5

## (undated) DEVICE — KIT MINI STK MAX COAX 5FR 10CM

## (undated) DEVICE — SCISSOR CURVED ENDOPATH 5MM

## (undated) DEVICE — GLOVE PROTEXIS BLUE LATEX 7

## (undated) DEVICE — SUT SILK SH 2-0 30IN MP BLK

## (undated) DEVICE — NDL HYPO 22GX1 1/2 SYR 10ML LL

## (undated) DEVICE — SNARE CAPTIFLEX 2.4X27MM 240CM

## (undated) DEVICE — SYR ONLY LUER LOCK 20CC

## (undated) DEVICE — BLOCK BLOX BITE DENT RIM 54FR

## (undated) DEVICE — TRAY SKIN SCRUB WET PREMIUM

## (undated) DEVICE — SUPPORT ULNA NERVE PROTECTOR

## (undated) DEVICE — DRAPE STERI LONG

## (undated) DEVICE — ELECTRODE BLD EXT 6.50 ST DISP

## (undated) DEVICE — SUT 2/0 30IN PROLENE MONO